# Patient Record
Sex: FEMALE | Race: WHITE | NOT HISPANIC OR LATINO | Employment: OTHER | RURAL
[De-identification: names, ages, dates, MRNs, and addresses within clinical notes are randomized per-mention and may not be internally consistent; named-entity substitution may affect disease eponyms.]

---

## 2020-01-08 LAB
CHOLEST SERPL-MSCNC: 227 MG/DL (ref 0–200)
HDLC SERPL-MCNC: 80 MG/DL
LDLC SERPL CALC-MCNC: 123 MG/DL
TRIGL SERPL-MCNC: 121 MG/DL

## 2020-06-08 ENCOUNTER — HISTORICAL (OUTPATIENT)
Dept: ADMINISTRATIVE | Facility: HOSPITAL | Age: 80
End: 2020-06-08

## 2020-06-08 LAB
ALBUMIN SERPL BCP-MCNC: 4.1 G/DL (ref 3.5–5)
ALBUMIN/GLOB SERPL: 1.1 {RATIO}
ALP SERPL-CCNC: 57 U/L (ref 55–142)
ALT SERPL W P-5'-P-CCNC: 7 U/L (ref 13–56)
AST SERPL W P-5'-P-CCNC: 19 U/L (ref 15–37)
BASOPHILS # BLD AUTO: 0.02 X10E3/UL (ref 0–0.2)
BASOPHILS NFR BLD AUTO: 0.3 % (ref 0–1)
BILIRUB SERPL-MCNC: 0.7 MG/DL (ref 0–1.2)
BUN SERPL-MCNC: 15 MG/DL (ref 7–18)
BUN/CREAT SERPL: 13.8
CALCIUM SERPL-MCNC: 9 MG/DL (ref 8.5–10.1)
CHLORIDE SERPL-SCNC: 106 MMOL/L (ref 98–107)
CO2 SERPL-SCNC: 28 MMOL/L (ref 21–32)
CREAT SERPL-MCNC: 1.09 MG/DL (ref 0.55–1.02)
EOSINOPHIL # BLD AUTO: 0.11 X10E3/UL (ref 0–0.5)
EOSINOPHIL NFR BLD AUTO: 1.7 % (ref 1–4)
ERYTHROCYTE [DISTWIDTH] IN BLOOD BY AUTOMATED COUNT: 13.1 % (ref 11.5–14.5)
GLOBULIN SER-MCNC: 3.6 G/DL (ref 2–4)
GLUCOSE SERPL-MCNC: 129 MG/DL (ref 74–106)
HCT VFR BLD AUTO: 40.3 % (ref 38–47)
HGB BLD-MCNC: 13.5 G/DL (ref 12–16)
LYMPHOCYTES # BLD AUTO: 1.75 X10E3/UL (ref 1–4.8)
LYMPHOCYTES NFR BLD AUTO: 27.8 % (ref 27–41)
MCH RBC QN AUTO: 31.7 PG (ref 27–31)
MCHC RBC AUTO-ENTMCNC: 33.5 G/DL (ref 32–36)
MCV RBC AUTO: 95 FL (ref 80–96)
MONOCYTES # BLD AUTO: 0.45 X10E3/UL (ref 0–0.8)
MONOCYTES NFR BLD AUTO: 7.1 % (ref 2–6)
MPC BLD CALC-MCNC: 10.4 FL (ref 9.4–12.4)
NEUTROPHILS # BLD AUTO: 3.97 X10E3/UL (ref 1.8–7.7)
NEUTROPHILS NFR BLD AUTO: 63.1 % (ref 53–65)
PLATELET # BLD AUTO: 252 X10E3/UL (ref 150–400)
POTASSIUM SERPL-SCNC: 3.7 MMOL/L (ref 3.5–5.1)
PROT SERPL-MCNC: 7.7 G/DL (ref 6.4–8.2)
RBC # BLD AUTO: 4.26 X10E6/UL (ref 4.2–5.4)
SODIUM SERPL-SCNC: 143 MMOL/L (ref 136–145)
WBC # BLD AUTO: 6.3 X10E3/UL (ref 4.5–11)

## 2020-06-24 ENCOUNTER — HISTORICAL (OUTPATIENT)
Dept: ADMINISTRATIVE | Facility: HOSPITAL | Age: 80
End: 2020-06-24

## 2020-06-24 LAB
BACTERIA #/AREA URNS HPF: ABNORMAL /HPF
BASOPHILS # BLD AUTO: 0.02 X10E3/UL (ref 0–0.2)
BASOPHILS NFR BLD AUTO: 0.3 % (ref 0–1)
BILIRUB UR QL STRIP: NEGATIVE MG/DL
CAOX CRY #/AREA URNS LPF: ABNORMAL /LPF
CLARITY UR: ABNORMAL
COLOR UR: YELLOW
EOSINOPHIL # BLD AUTO: 0.11 X10E3/UL (ref 0–0.5)
EOSINOPHIL NFR BLD AUTO: 1.7 % (ref 1–4)
ERYTHROCYTE [DISTWIDTH] IN BLOOD BY AUTOMATED COUNT: 13.2 % (ref 11.5–14.5)
GLUCOSE UR STRIP-MCNC: NEGATIVE MG/DL
HCT VFR BLD AUTO: 39.9 % (ref 38–47)
HGB BLD-MCNC: 13 G/DL (ref 12–16)
IMM GRANULOCYTES # BLD AUTO: 0.03 X10E3/UL (ref 0–0.04)
IMM GRANULOCYTES NFR BLD: 0.5 % (ref 0–0.4)
KETONES UR STRIP-SCNC: ABNORMAL MG/DL
LEUKOCYTE ESTERASE UR QL STRIP: ABNORMAL LEU/UL
LYMPHOCYTES # BLD AUTO: 1.31 X10E3/UL (ref 1–4.8)
LYMPHOCYTES NFR BLD AUTO: 20.2 % (ref 27–41)
MCH RBC QN AUTO: 30.7 PG (ref 27–31)
MCHC RBC AUTO-ENTMCNC: 32.6 G/DL (ref 32–36)
MCV RBC AUTO: 94.1 FL (ref 80–96)
MONOCYTES # BLD AUTO: 0.45 X10E3/UL (ref 0–0.8)
MONOCYTES NFR BLD AUTO: 6.9 % (ref 2–6)
MPC BLD CALC-MCNC: 11.1 FL (ref 9.4–12.4)
NEUTROPHILS # BLD AUTO: 4.57 X10E3/UL (ref 1.8–7.7)
NEUTROPHILS NFR BLD AUTO: 70.4 % (ref 53–65)
NITRITE UR QL STRIP: NEGATIVE
NRBC # BLD AUTO: 0 X10E3/UL (ref 0–0)
NRBC, AUTO (.00): 0 /100 (ref 0–0)
PH UR STRIP: 5.5 PH UNITS (ref 5–8)
PLATELET # BLD AUTO: 304 X10E3/UL (ref 150–400)
PROT UR QL STRIP: ABNORMAL MG/DL
RBC # BLD AUTO: 4.24 X10E6/UL (ref 4.2–5.4)
RBC # UR STRIP: NEGATIVE ERY/UL
RBC #/AREA URNS HPF: ABNORMAL /HPF (ref 0–3)
SP GR UR STRIP: 1.02 (ref 1–1.03)
SQUAMOUS #/AREA URNS LPF: ABNORMAL /LPF
TRANS CELLS #/AREA URNS LPF: ABNORMAL /LPF
UROBILINOGEN UR STRIP-ACNC: 0.2 EU/DL
WBC # BLD AUTO: 6.49 X10E3/UL (ref 4.5–11)
WBC #/AREA URNS HPF: ABNORMAL /HPF (ref 0–5)

## 2020-06-26 LAB
REPORT: 38
REPORT: NORMAL

## 2020-08-10 ENCOUNTER — HISTORICAL (OUTPATIENT)
Dept: ADMINISTRATIVE | Facility: HOSPITAL | Age: 80
End: 2020-08-10

## 2020-08-17 ENCOUNTER — HISTORICAL (OUTPATIENT)
Dept: ADMINISTRATIVE | Facility: HOSPITAL | Age: 80
End: 2020-08-17

## 2020-08-17 LAB
APTT PPP: 27.1 SECONDS (ref 25.2–37.3)
HCT VFR BLD AUTO: 32.6 % (ref 38–47)
HGB BLD-MCNC: 11.1 G/DL (ref 12–16)
INR BLD: 1.03 (ref 0–3.3)
MCHC RBC AUTO-ENTMCNC: 34 G/DL (ref 32–36)
PROTHROMBIN TIME: 13 SECONDS (ref 11.7–14.7)

## 2020-08-18 ENCOUNTER — HISTORICAL (OUTPATIENT)
Dept: ADMINISTRATIVE | Facility: HOSPITAL | Age: 80
End: 2020-08-18

## 2020-08-18 LAB
BASOPHILS # BLD AUTO: 0.03 X10E3/UL (ref 0–0.2)
BASOPHILS NFR BLD AUTO: 0.3 % (ref 0–1)
EOSINOPHIL # BLD AUTO: 0.04 X10E3/UL (ref 0–0.5)
EOSINOPHIL NFR BLD AUTO: 0.4 % (ref 1–4)
ERYTHROCYTE [DISTWIDTH] IN BLOOD BY AUTOMATED COUNT: 13.2 % (ref 11.5–14.5)
HCT VFR BLD AUTO: 31.5 % (ref 38–47)
HGB BLD-MCNC: 10.4 G/DL (ref 12–16)
IMM GRANULOCYTES # BLD AUTO: 0.03 X10E3/UL (ref 0–0.04)
IMM GRANULOCYTES NFR BLD: 0.3 % (ref 0–0.4)
INSULIN SERPL-ACNC: NORMAL U[IU]/ML
LAB AP CLINICAL INFORMATION: NORMAL
LAB AP DIAGNOSIS - HISTORICAL: NORMAL
LAB AP GROSS PATHOLOGY - HISTORICAL: NORMAL
LAB AP SPECIMEN SUBMITTED - HISTORICAL: NORMAL
LYMPHOCYTES # BLD AUTO: 1.88 X10E3/UL (ref 1–4.8)
LYMPHOCYTES NFR BLD AUTO: 19.7 % (ref 27–41)
MCH RBC QN AUTO: 31.6 PG (ref 27–31)
MCHC RBC AUTO-ENTMCNC: 33 G/DL (ref 32–36)
MCV RBC AUTO: 95.7 FL (ref 80–96)
MONOCYTES # BLD AUTO: 0.8 X10E3/UL (ref 0–0.8)
MONOCYTES NFR BLD AUTO: 8.4 % (ref 2–6)
MPC BLD CALC-MCNC: 10.9 FL (ref 9.4–12.4)
NEUTROPHILS # BLD AUTO: 6.77 X10E3/UL (ref 1.8–7.7)
NEUTROPHILS NFR BLD AUTO: 70.9 % (ref 53–65)
NRBC # BLD AUTO: 0 X10E3/UL (ref 0–0)
NRBC, AUTO (.00): 0 /100 (ref 0–0)
PLATELET # BLD AUTO: 231 X10E3/UL (ref 150–400)
RBC # BLD AUTO: 3.29 X10E6/UL (ref 4.2–5.4)
WBC # BLD AUTO: 9.55 X10E3/UL (ref 4.5–11)

## 2020-08-19 ENCOUNTER — HISTORICAL (OUTPATIENT)
Dept: ADMINISTRATIVE | Facility: HOSPITAL | Age: 80
End: 2020-08-19

## 2020-08-19 LAB
BASOPHILS # BLD AUTO: 0.02 X10E3/UL (ref 0–0.2)
BASOPHILS NFR BLD AUTO: 0.3 % (ref 0–1)
EOSINOPHIL # BLD AUTO: 0.09 X10E3/UL (ref 0–0.5)
EOSINOPHIL NFR BLD AUTO: 1.5 % (ref 1–4)
ERYTHROCYTE [DISTWIDTH] IN BLOOD BY AUTOMATED COUNT: 13.9 % (ref 11.5–14.5)
HCT VFR BLD AUTO: 34.7 % (ref 38–47)
HGB BLD-MCNC: 11.2 G/DL (ref 12–16)
IMM GRANULOCYTES # BLD AUTO: 0.02 X10E3/UL (ref 0–0.04)
IMM GRANULOCYTES NFR BLD: 0.3 % (ref 0–0.4)
LYMPHOCYTES # BLD AUTO: 1.17 X10E3/UL (ref 1–4.8)
LYMPHOCYTES NFR BLD AUTO: 19.6 % (ref 27–41)
MCH RBC QN AUTO: 31.2 PG (ref 27–31)
MCHC RBC AUTO-ENTMCNC: 32.3 G/DL (ref 32–36)
MCV RBC AUTO: 96.7 FL (ref 80–96)
MONOCYTES # BLD AUTO: 0.39 X10E3/UL (ref 0–0.8)
MONOCYTES NFR BLD AUTO: 6.5 % (ref 2–6)
MPC BLD CALC-MCNC: 11.1 FL (ref 9.4–12.4)
NEUTROPHILS # BLD AUTO: 4.28 X10E3/UL (ref 1.8–7.7)
NEUTROPHILS NFR BLD AUTO: 71.8 % (ref 53–65)
NRBC # BLD AUTO: 0 X10E3/UL (ref 0–0)
NRBC, AUTO (.00): 0 /100 (ref 0–0)
PLATELET # BLD AUTO: 261 X10E3/UL (ref 150–400)
RBC # BLD AUTO: 3.59 X10E6/UL (ref 4.2–5.4)
WBC # BLD AUTO: 5.97 X10E3/UL (ref 4.5–11)

## 2020-11-05 ENCOUNTER — HISTORICAL (OUTPATIENT)
Dept: ADMINISTRATIVE | Facility: HOSPITAL | Age: 80
End: 2020-11-05

## 2020-11-05 LAB
BILIRUB UR QL STRIP: NEGATIVE MG/DL
CLARITY UR: ABNORMAL
COLOR UR: YELLOW
GLUCOSE UR STRIP-MCNC: NEGATIVE MG/DL
KETONES UR STRIP-SCNC: NEGATIVE MG/DL
LEUKOCYTE ESTERASE UR QL STRIP: ABNORMAL LEU/UL
NITRITE UR QL STRIP: NEGATIVE
PH UR STRIP: 7 PH UNITS (ref 5–8)
PROT UR QL STRIP: 30 MG/DL
RBC # UR STRIP: ABNORMAL ERY/UL
RBC #/AREA URNS HPF: ABNORMAL /HPF (ref 0–3)
SP GR UR STRIP: 1.02 (ref 1–1.03)
UROBILINOGEN UR STRIP-ACNC: 0.2 MG/DL
WBC #/AREA URNS HPF: ABNORMAL /HPF (ref 0–5)

## 2020-11-24 ENCOUNTER — HISTORICAL (OUTPATIENT)
Dept: ADMINISTRATIVE | Facility: HOSPITAL | Age: 80
End: 2020-11-24

## 2020-11-26 LAB
REPORT: NORMAL

## 2021-01-08 ENCOUNTER — HISTORICAL (OUTPATIENT)
Dept: ADMINISTRATIVE | Facility: HOSPITAL | Age: 81
End: 2021-01-08

## 2021-01-08 LAB
AMORPH PHOS CRY #/AREA URNS LPF: ABNORMAL /LPF
BACTERIA #/AREA URNS HPF: ABNORMAL /HPF
BILIRUB UR QL STRIP: NEGATIVE MG/DL
CLARITY UR: ABNORMAL
COLOR UR: YELLOW
GLUCOSE UR STRIP-MCNC: NEGATIVE MG/DL
KETONES UR STRIP-SCNC: NEGATIVE MG/DL
LEUKOCYTE ESTERASE UR QL STRIP: ABNORMAL LEU/UL
NITRITE UR QL STRIP: NEGATIVE
PH UR STRIP: 5.5 PH UNITS (ref 5–8)
PROT UR QL STRIP: NEGATIVE MG/DL
RBC # UR STRIP: ABNORMAL ERY/UL
RBC #/AREA URNS HPF: ABNORMAL /HPF (ref 0–3)
SP GR UR STRIP: 1.01 (ref 1–1.03)
SQUAMOUS #/AREA URNS LPF: ABNORMAL /LPF
UROBILINOGEN UR STRIP-ACNC: 0.2 MG/DL
WBC #/AREA URNS HPF: ABNORMAL /HPF (ref 0–5)

## 2021-01-10 LAB
REPORT: 38
REPORT: NORMAL

## 2021-01-26 ENCOUNTER — HISTORICAL (OUTPATIENT)
Dept: ADMINISTRATIVE | Facility: HOSPITAL | Age: 81
End: 2021-01-26

## 2021-01-26 LAB
BACTERIA #/AREA URNS HPF: ABNORMAL /HPF
BILIRUB UR QL STRIP: NEGATIVE MG/DL
CLARITY UR: ABNORMAL
COLOR UR: YELLOW
GLUCOSE UR STRIP-MCNC: NEGATIVE MG/DL
KETONES UR STRIP-SCNC: NEGATIVE MG/DL
LEUKOCYTE ESTERASE UR QL STRIP: ABNORMAL LEU/UL
NITRITE UR QL STRIP: NEGATIVE
PH UR STRIP: 6 PH UNITS (ref 5–8)
PROT UR QL STRIP: NEGATIVE MG/DL
RBC # UR STRIP: NEGATIVE ERY/UL
RBC #/AREA URNS HPF: ABNORMAL /HPF (ref 0–3)
SP GR UR STRIP: 1.02 (ref 1–1.03)
SQUAMOUS #/AREA URNS LPF: ABNORMAL /LPF
UROBILINOGEN UR STRIP-ACNC: 1 MG/DL
WBC #/AREA URNS HPF: ABNORMAL /HPF (ref 0–5)

## 2021-01-28 LAB
REPORT: 38                 38
REPORT: NORMAL

## 2021-03-09 ENCOUNTER — HISTORICAL (OUTPATIENT)
Dept: ADMINISTRATIVE | Facility: HOSPITAL | Age: 81
End: 2021-03-09

## 2021-03-09 LAB
BACTERIA #/AREA URNS HPF: ABNORMAL /HPF
BILIRUB UR QL STRIP: NEGATIVE MG/DL
CLARITY UR: CLEAR
COLOR UR: YELLOW
GLUCOSE UR STRIP-MCNC: NEGATIVE MG/DL
HYALINE CASTS #/AREA URNS LPF: ABNORMAL /LPF (ref 0–2)
KETONES UR STRIP-SCNC: ABNORMAL MG/DL
LEUKOCYTE ESTERASE UR QL STRIP: NEGATIVE LEU/UL
MUCOUS THREADS #/AREA URNS HPF: ABNORMAL /HPF
NITRITE UR QL STRIP: NEGATIVE
PH UR STRIP: 5.5 PH UNITS (ref 5–8)
PROT UR QL STRIP: ABNORMAL MG/DL
RBC # UR STRIP: NEGATIVE ERY/UL
RBC #/AREA URNS HPF: ABNORMAL /HPF (ref 0–3)
SP GR UR STRIP: 1.02 (ref 1–1.03)
SQUAMOUS #/AREA URNS LPF: ABNORMAL /LPF
UROBILINOGEN UR STRIP-ACNC: 0.2 EU/DL
WBC #/AREA URNS HPF: ABNORMAL /HPF (ref 0–5)

## 2021-03-10 LAB
REPORT: NORMAL

## 2021-03-18 ENCOUNTER — TELEPHONE (OUTPATIENT)
Dept: OBSTETRICS AND GYNECOLOGY | Facility: CLINIC | Age: 81
End: 2021-03-18

## 2021-03-29 RX ORDER — ESTRADIOL 0.1 MG/G
1 CREAM VAGINAL
COMMUNITY
End: 2021-10-19

## 2021-03-29 RX ORDER — NITROFURANTOIN 25; 75 MG/1; MG/1
100 CAPSULE ORAL DAILY
COMMUNITY
Start: 2021-03-29 | End: 2021-07-20 | Stop reason: SDUPTHER

## 2021-04-20 ENCOUNTER — OFFICE VISIT (OUTPATIENT)
Dept: OBSTETRICS AND GYNECOLOGY | Facility: CLINIC | Age: 81
End: 2021-04-20
Payer: MEDICARE

## 2021-04-20 VITALS
DIASTOLIC BLOOD PRESSURE: 90 MMHG | TEMPERATURE: 97 F | RESPIRATION RATE: 16 BRPM | SYSTOLIC BLOOD PRESSURE: 135 MMHG | HEART RATE: 82 BPM | BODY MASS INDEX: 15.22 KG/M2 | HEIGHT: 67 IN | WEIGHT: 97 LBS

## 2021-04-20 DIAGNOSIS — N32.81 OAB (OVERACTIVE BLADDER): Primary | ICD-10-CM

## 2021-04-20 PROCEDURE — 95972 ALYS CPLX SP/PN NPGT W/PRGRM: CPT | Mod: ICN,,, | Performed by: OBSTETRICS & GYNECOLOGY

## 2021-04-20 PROCEDURE — 95972 PR PRG SPNL GEN CMPLX: ICD-10-PCS | Mod: ICN,,, | Performed by: OBSTETRICS & GYNECOLOGY

## 2021-06-30 ENCOUNTER — OFFICE VISIT (OUTPATIENT)
Dept: OBSTETRICS AND GYNECOLOGY | Facility: CLINIC | Age: 81
End: 2021-06-30
Payer: MEDICARE

## 2021-06-30 VITALS
RESPIRATION RATE: 16 BRPM | WEIGHT: 93 LBS | HEIGHT: 67 IN | HEART RATE: 93 BPM | DIASTOLIC BLOOD PRESSURE: 82 MMHG | BODY MASS INDEX: 14.6 KG/M2 | TEMPERATURE: 98 F | SYSTOLIC BLOOD PRESSURE: 126 MMHG

## 2021-06-30 DIAGNOSIS — N30.90 CYSTITIS: ICD-10-CM

## 2021-06-30 DIAGNOSIS — N95.2 VAGINITIS, ATROPHIC: ICD-10-CM

## 2021-06-30 DIAGNOSIS — R39.15 URGENCY OF URINATION: ICD-10-CM

## 2021-06-30 DIAGNOSIS — R35.0 FREQUENCY OF URINATION: ICD-10-CM

## 2021-06-30 DIAGNOSIS — R35.1 NOCTURIA: ICD-10-CM

## 2021-06-30 DIAGNOSIS — N32.81 OAB (OVERACTIVE BLADDER): Primary | ICD-10-CM

## 2021-06-30 LAB
BILIRUB UR QL STRIP: NEGATIVE
CLARITY UR: CLEAR
COLOR UR: YELLOW
GLUCOSE UR STRIP-MCNC: NEGATIVE MG/DL
KETONES UR STRIP-SCNC: 15 MG/DL
LEUKOCYTE ESTERASE UR QL STRIP: NEGATIVE
NITRITE UR QL STRIP: NEGATIVE
PH UR STRIP: 6 PH UNITS
PROT UR QL STRIP: ABNORMAL
RBC # UR STRIP: NEGATIVE /UL
SP GR UR STRIP: 1.01
UROBILINOGEN UR STRIP-ACNC: 1 MG/DL

## 2021-06-30 PROCEDURE — 81003 URINALYSIS AUTO W/O SCOPE: CPT | Mod: QW,,, | Performed by: CLINICAL MEDICAL LABORATORY

## 2021-06-30 PROCEDURE — 99214 PR OFFICE/OUTPT VISIT, EST, LEVL IV, 30-39 MIN: ICD-10-PCS | Mod: ,,, | Performed by: OBSTETRICS & GYNECOLOGY

## 2021-06-30 PROCEDURE — 99214 OFFICE O/P EST MOD 30 MIN: CPT | Mod: ,,, | Performed by: OBSTETRICS & GYNECOLOGY

## 2021-06-30 PROCEDURE — 81003 URINALYSIS, REFLEX TO URINE CULTURE: ICD-10-PCS | Mod: QW,,, | Performed by: CLINICAL MEDICAL LABORATORY

## 2021-06-30 RX ORDER — MIRTAZAPINE 15 MG/1
TABLET, FILM COATED ORAL
COMMUNITY
End: 2021-10-19

## 2021-06-30 RX ORDER — CALCIUM CARBONATE/VITAMIN D3 600 MG-20
TABLET,CHEWABLE ORAL DAILY
COMMUNITY

## 2021-06-30 RX ORDER — PANTOPRAZOLE SODIUM 40 MG/1
40 TABLET, DELAYED RELEASE ORAL 2 TIMES DAILY
COMMUNITY
Start: 2021-06-28

## 2021-06-30 RX ORDER — IBUPROFEN 800 MG/1
TABLET ORAL
COMMUNITY
Start: 2021-01-28 | End: 2021-11-10 | Stop reason: SDUPTHER

## 2021-06-30 RX ORDER — POTASSIUM CHLORIDE 750 MG/1
10 CAPSULE, EXTENDED RELEASE ORAL 2 TIMES DAILY
COMMUNITY
Start: 2021-06-18 | End: 2021-12-02 | Stop reason: SDUPTHER

## 2021-06-30 RX ORDER — LEVOTHYROXINE SODIUM 125 UG/1
TABLET ORAL
COMMUNITY
Start: 2021-04-07 | End: 2021-09-30

## 2021-06-30 RX ORDER — FLUDROCORTISONE ACETATE 0.1 MG/1
100 TABLET ORAL 2 TIMES DAILY
COMMUNITY
Start: 2021-06-28 | End: 2021-10-19

## 2021-06-30 RX ORDER — CETIRIZINE HYDROCHLORIDE 10 MG/1
TABLET ORAL
COMMUNITY
End: 2021-09-20 | Stop reason: SDUPTHER

## 2021-06-30 RX ORDER — MONTELUKAST SODIUM 10 MG/1
TABLET ORAL
COMMUNITY

## 2021-06-30 RX ORDER — PYRIDOSTIGMINE BROMIDE 60 MG/1
30 TABLET ORAL 2 TIMES DAILY
COMMUNITY
Start: 2021-06-18 | End: 2023-10-03

## 2021-06-30 RX ORDER — CARBIDOPA AND LEVODOPA 25; 100 MG/1; MG/1
TABLET ORAL
COMMUNITY
End: 2022-05-09 | Stop reason: SDUPTHER

## 2021-06-30 RX ORDER — ONDANSETRON 4 MG/1
TABLET, ORALLY DISINTEGRATING ORAL
COMMUNITY
Start: 2021-04-06 | End: 2021-08-19 | Stop reason: SDUPTHER

## 2021-06-30 RX ORDER — VIT C/E/ZN/COPPR/LUTEIN/ZEAXAN 250MG-90MG
1000 CAPSULE ORAL DAILY
COMMUNITY

## 2021-06-30 RX ORDER — TRAMADOL HYDROCHLORIDE 50 MG/1
TABLET ORAL
COMMUNITY
Start: 2021-06-22

## 2021-06-30 RX ORDER — DONEPEZIL HYDROCHLORIDE 10 MG/1
10 TABLET, FILM COATED ORAL DAILY
COMMUNITY
Start: 2021-06-08

## 2021-06-30 RX ORDER — VENLAFAXINE HYDROCHLORIDE 75 MG/1
75 CAPSULE, EXTENDED RELEASE ORAL DAILY
COMMUNITY
Start: 2021-05-03 | End: 2023-04-19 | Stop reason: DRUGHIGH

## 2021-07-14 ENCOUNTER — TELEPHONE (OUTPATIENT)
Dept: FAMILY MEDICINE | Facility: CLINIC | Age: 81
End: 2021-07-14

## 2021-07-15 ENCOUNTER — TELEPHONE (OUTPATIENT)
Dept: OBSTETRICS AND GYNECOLOGY | Facility: CLINIC | Age: 81
End: 2021-07-15

## 2021-07-20 ENCOUNTER — TELEPHONE (OUTPATIENT)
Dept: OBSTETRICS AND GYNECOLOGY | Facility: CLINIC | Age: 81
End: 2021-07-20

## 2021-07-20 RX ORDER — NITROFURANTOIN 25; 75 MG/1; MG/1
100 CAPSULE ORAL DAILY
Qty: 90 CAPSULE | Refills: 1 | Status: SHIPPED | OUTPATIENT
Start: 2021-07-20 | End: 2022-01-16

## 2021-07-29 ENCOUNTER — HOSPITAL ENCOUNTER (EMERGENCY)
Facility: HOSPITAL | Age: 81
Discharge: HOME | End: 2021-07-29
Attending: FAMILY MEDICINE
Payer: MEDICARE

## 2021-07-29 VITALS
DIASTOLIC BLOOD PRESSURE: 92 MMHG | HEIGHT: 67 IN | RESPIRATION RATE: 19 BRPM | WEIGHT: 90 LBS | BODY MASS INDEX: 14.12 KG/M2 | HEART RATE: 85 BPM | SYSTOLIC BLOOD PRESSURE: 159 MMHG | OXYGEN SATURATION: 98 % | TEMPERATURE: 98 F

## 2021-07-29 DIAGNOSIS — I10 ESSENTIAL HYPERTENSION: Primary | ICD-10-CM

## 2021-07-29 LAB
ALBUMIN SERPL BCP-MCNC: 3.6 G/DL (ref 3.5–5)
ALBUMIN/GLOB SERPL: 1.1 {RATIO}
ALP SERPL-CCNC: 76 U/L (ref 55–142)
ALT SERPL W P-5'-P-CCNC: 10 U/L (ref 13–56)
ANION GAP SERPL CALCULATED.3IONS-SCNC: 10 MMOL/L (ref 7–16)
AST SERPL W P-5'-P-CCNC: 15 U/L (ref 15–37)
BASOPHILS # BLD AUTO: 0.02 K/UL (ref 0–0.2)
BASOPHILS NFR BLD AUTO: 0.4 % (ref 0–1)
BILIRUB SERPL-MCNC: 0.3 MG/DL (ref 0–1.2)
BUN SERPL-MCNC: 14 MG/DL (ref 7–18)
BUN/CREAT SERPL: 18 (ref 6–20)
CALCIUM SERPL-MCNC: 8.2 MG/DL (ref 8.5–10.1)
CHLORIDE SERPL-SCNC: 104 MMOL/L (ref 98–107)
CO2 SERPL-SCNC: 29 MMOL/L (ref 21–32)
CREAT SERPL-MCNC: 0.78 MG/DL (ref 0.55–1.02)
DIFFERENTIAL METHOD BLD: ABNORMAL
EOSINOPHIL # BLD AUTO: 0.06 K/UL (ref 0–0.5)
EOSINOPHIL NFR BLD AUTO: 1.2 % (ref 1–4)
ERYTHROCYTE [DISTWIDTH] IN BLOOD BY AUTOMATED COUNT: 13.1 % (ref 11.5–14.5)
GLOBULIN SER-MCNC: 3.2 G/DL (ref 2–4)
GLUCOSE SERPL-MCNC: 100 MG/DL (ref 74–106)
HCT VFR BLD AUTO: 35.9 % (ref 38–47)
HGB BLD-MCNC: 12.2 G/DL (ref 12–16)
LYMPHOCYTES # BLD AUTO: 1.23 K/UL (ref 1–4.8)
LYMPHOCYTES NFR BLD AUTO: 24.6 % (ref 27–41)
MCH RBC QN AUTO: 32.3 PG (ref 27–31)
MCHC RBC AUTO-ENTMCNC: 34 G/DL (ref 32–36)
MCV RBC AUTO: 95 FL (ref 80–96)
MONOCYTES # BLD AUTO: 0.45 K/UL (ref 0–0.8)
MONOCYTES NFR BLD AUTO: 9 % (ref 2–6)
MPC BLD CALC-MCNC: 10.2 FL (ref 9.4–12.4)
NEUTROPHILS # BLD AUTO: 3.24 K/UL (ref 1.8–7.7)
NEUTROPHILS NFR BLD AUTO: 64.8 % (ref 53–65)
NRBC BLD MANUAL-RTO: NORMAL %
PLATELET # BLD AUTO: 288 K/UL (ref 150–400)
POTASSIUM SERPL-SCNC: 3.7 MMOL/L (ref 3.5–5.1)
PROT SERPL-MCNC: 6.8 G/DL (ref 6.4–8.2)
RBC # BLD AUTO: 3.78 M/UL (ref 4.2–5.4)
SODIUM SERPL-SCNC: 139 MMOL/L (ref 136–145)
WBC # BLD AUTO: 5 K/UL (ref 4.5–11)

## 2021-07-29 PROCEDURE — 99282 EMERGENCY DEPT VISIT SF MDM: CPT

## 2021-07-29 PROCEDURE — 36415 COLL VENOUS BLD VENIPUNCTURE: CPT | Performed by: FAMILY MEDICINE

## 2021-07-29 PROCEDURE — 85025 COMPLETE CBC W/AUTO DIFF WBC: CPT | Performed by: FAMILY MEDICINE

## 2021-07-29 PROCEDURE — 80053 COMPREHEN METABOLIC PANEL: CPT | Performed by: FAMILY MEDICINE

## 2021-07-29 PROCEDURE — 99282 EMERGENCY DEPT VISIT SF MDM: CPT | Performed by: FAMILY MEDICINE

## 2021-07-30 ENCOUNTER — TELEPHONE (OUTPATIENT)
Dept: EMERGENCY MEDICINE | Facility: HOSPITAL | Age: 81
End: 2021-07-30

## 2021-08-19 RX ORDER — ONDANSETRON 4 MG/1
4 TABLET, ORALLY DISINTEGRATING ORAL EVERY 12 HOURS PRN
Qty: 60 TABLET | Refills: 1 | Status: SHIPPED | OUTPATIENT
Start: 2021-08-19 | End: 2022-02-09 | Stop reason: SDUPTHER

## 2021-09-20 RX ORDER — CETIRIZINE HYDROCHLORIDE 10 MG/1
10 TABLET ORAL DAILY
Qty: 90 TABLET | Refills: 1 | Status: SHIPPED | OUTPATIENT
Start: 2021-09-20 | End: 2022-05-02 | Stop reason: SDUPTHER

## 2021-09-28 ENCOUNTER — OFFICE VISIT (OUTPATIENT)
Dept: FAMILY MEDICINE | Facility: CLINIC | Age: 81
End: 2021-09-28
Payer: MEDICARE

## 2021-09-28 VITALS
HEIGHT: 67 IN | HEART RATE: 104 BPM | BODY MASS INDEX: 14.57 KG/M2 | TEMPERATURE: 97 F | SYSTOLIC BLOOD PRESSURE: 102 MMHG | OXYGEN SATURATION: 97 % | WEIGHT: 92.81 LBS | DIASTOLIC BLOOD PRESSURE: 58 MMHG

## 2021-09-28 DIAGNOSIS — E03.9 HYPOTHYROIDISM, UNSPECIFIED TYPE: ICD-10-CM

## 2021-09-28 DIAGNOSIS — E86.0 DEHYDRATION: Primary | ICD-10-CM

## 2021-09-28 DIAGNOSIS — R68.84 JAW PAIN: ICD-10-CM

## 2021-09-28 LAB
ANION GAP SERPL CALCULATED.3IONS-SCNC: 10 MMOL/L (ref 7–16)
BUN SERPL-MCNC: 27 MG/DL (ref 7–18)
BUN/CREAT SERPL: 20 (ref 6–20)
CALCIUM SERPL-MCNC: 9.6 MG/DL (ref 8.5–10.1)
CHLORIDE SERPL-SCNC: 109 MMOL/L (ref 98–107)
CO2 SERPL-SCNC: 26 MMOL/L (ref 21–32)
CREAT SERPL-MCNC: 1.33 MG/DL (ref 0.55–1.02)
GLUCOSE SERPL-MCNC: 126 MG/DL (ref 74–106)
POTASSIUM SERPL-SCNC: 4.8 MMOL/L (ref 3.5–5.1)
SODIUM SERPL-SCNC: 140 MMOL/L (ref 136–145)
TSH SERPL DL<=0.005 MIU/L-ACNC: 0.03 UIU/ML (ref 0.36–3.74)

## 2021-09-28 PROCEDURE — 99213 PR OFFICE/OUTPT VISIT, EST, LEVL III, 20-29 MIN: ICD-10-PCS | Mod: ,,, | Performed by: NURSE PRACTITIONER

## 2021-09-28 PROCEDURE — 80048 BASIC METABOLIC PANEL: ICD-10-PCS | Mod: ,,, | Performed by: CLINICAL MEDICAL LABORATORY

## 2021-09-28 PROCEDURE — 84443 ASSAY THYROID STIM HORMONE: CPT | Mod: ,,, | Performed by: CLINICAL MEDICAL LABORATORY

## 2021-09-28 PROCEDURE — 84443 TSH: ICD-10-PCS | Mod: ,,, | Performed by: CLINICAL MEDICAL LABORATORY

## 2021-09-28 PROCEDURE — 80048 BASIC METABOLIC PNL TOTAL CA: CPT | Mod: ,,, | Performed by: CLINICAL MEDICAL LABORATORY

## 2021-09-28 PROCEDURE — 99213 OFFICE O/P EST LOW 20 MIN: CPT | Mod: ,,, | Performed by: NURSE PRACTITIONER

## 2021-09-30 PROBLEM — E03.9 HYPOTHYROIDISM: Status: ACTIVE | Noted: 2021-09-30

## 2021-09-30 RX ORDER — LEVOTHYROXINE SODIUM 100 UG/1
100 TABLET ORAL
COMMUNITY
End: 2021-09-30 | Stop reason: SDUPTHER

## 2021-09-30 RX ORDER — LEVOTHYROXINE SODIUM 100 UG/1
100 TABLET ORAL
Qty: 90 TABLET | Refills: 1 | Status: SHIPPED | OUTPATIENT
Start: 2021-09-30 | End: 2022-02-23 | Stop reason: DRUGHIGH

## 2021-10-19 ENCOUNTER — OFFICE VISIT (OUTPATIENT)
Dept: OBSTETRICS AND GYNECOLOGY | Facility: CLINIC | Age: 81
End: 2021-10-19
Payer: MEDICARE

## 2021-10-19 VITALS
WEIGHT: 92 LBS | SYSTOLIC BLOOD PRESSURE: 120 MMHG | TEMPERATURE: 98 F | HEIGHT: 67 IN | BODY MASS INDEX: 14.44 KG/M2 | DIASTOLIC BLOOD PRESSURE: 55 MMHG | RESPIRATION RATE: 16 BRPM | HEART RATE: 91 BPM

## 2021-10-19 DIAGNOSIS — E03.9 HYPOTHYROIDISM, UNSPECIFIED TYPE: ICD-10-CM

## 2021-10-19 DIAGNOSIS — R35.1 NOCTURIA MORE THAN TWICE PER NIGHT: ICD-10-CM

## 2021-10-19 DIAGNOSIS — N30.90 CYSTITIS: ICD-10-CM

## 2021-10-19 DIAGNOSIS — N95.2 VAGINITIS, ATROPHIC: ICD-10-CM

## 2021-10-19 DIAGNOSIS — N32.81 OAB (OVERACTIVE BLADDER): Primary | ICD-10-CM

## 2021-10-19 PROCEDURE — 99214 OFFICE O/P EST MOD 30 MIN: CPT | Mod: 25,,, | Performed by: OBSTETRICS & GYNECOLOGY

## 2021-10-19 PROCEDURE — 99214 PR OFFICE/OUTPT VISIT, EST, LEVL IV, 30-39 MIN: ICD-10-PCS | Mod: 25,,, | Performed by: OBSTETRICS & GYNECOLOGY

## 2021-10-19 PROCEDURE — 95972 PR PRG SPNL GEN CMPLX: ICD-10-PCS | Mod: ,,, | Performed by: OBSTETRICS & GYNECOLOGY

## 2021-10-19 PROCEDURE — 95972 ALYS CPLX SP/PN NPGT W/PRGRM: CPT | Mod: ,,, | Performed by: OBSTETRICS & GYNECOLOGY

## 2021-10-23 ENCOUNTER — HOSPITAL ENCOUNTER (EMERGENCY)
Facility: HOSPITAL | Age: 81
Discharge: HOME | End: 2021-10-23
Payer: MEDICARE

## 2021-10-23 VITALS
HEART RATE: 76 BPM | HEIGHT: 67 IN | DIASTOLIC BLOOD PRESSURE: 79 MMHG | SYSTOLIC BLOOD PRESSURE: 119 MMHG | OXYGEN SATURATION: 97 % | WEIGHT: 92 LBS | TEMPERATURE: 98 F | RESPIRATION RATE: 20 BRPM | BODY MASS INDEX: 14.44 KG/M2

## 2021-10-23 DIAGNOSIS — K11.20 PAROTITIS: Primary | ICD-10-CM

## 2021-10-23 PROCEDURE — 96375 TX/PRO/DX INJ NEW DRUG ADDON: CPT

## 2021-10-23 PROCEDURE — 63600175 PHARM REV CODE 636 W HCPCS: Performed by: NURSE PRACTITIONER

## 2021-10-23 PROCEDURE — 96374 THER/PROPH/DIAG INJ IV PUSH: CPT

## 2021-10-23 PROCEDURE — 99283 EMERGENCY DEPT VISIT LOW MDM: CPT | Mod: GF | Performed by: NURSE PRACTITIONER

## 2021-10-23 PROCEDURE — 99284 EMERGENCY DEPT VISIT MOD MDM: CPT | Mod: 25

## 2021-10-23 RX ORDER — METHYLPREDNISOLONE 4 MG/1
TABLET ORAL
Qty: 1 EACH | Refills: 0 | Status: SHIPPED | OUTPATIENT
Start: 2021-10-23 | End: 2021-11-13

## 2021-10-23 RX ORDER — DEXAMETHASONE SODIUM PHOSPHATE 4 MG/ML
4 INJECTION, SOLUTION INTRA-ARTICULAR; INTRALESIONAL; INTRAMUSCULAR; INTRAVENOUS; SOFT TISSUE
Status: COMPLETED | OUTPATIENT
Start: 2021-10-23 | End: 2021-10-23

## 2021-10-23 RX ORDER — ONDANSETRON 4 MG/1
4 TABLET, FILM COATED ORAL EVERY 8 HOURS PRN
Qty: 12 TABLET | Refills: 0 | Status: SHIPPED | OUTPATIENT
Start: 2021-10-23 | End: 2022-02-09 | Stop reason: SDUPTHER

## 2021-10-23 RX ORDER — ONDANSETRON 2 MG/ML
4 INJECTION INTRAMUSCULAR; INTRAVENOUS
Status: COMPLETED | OUTPATIENT
Start: 2021-10-23 | End: 2021-10-23

## 2021-10-23 RX ORDER — DEXAMETHASONE SODIUM PHOSPHATE 4 MG/ML
4 INJECTION, SOLUTION INTRA-ARTICULAR; INTRALESIONAL; INTRAMUSCULAR; INTRAVENOUS; SOFT TISSUE
Status: DISCONTINUED | OUTPATIENT
Start: 2021-10-23 | End: 2021-10-23

## 2021-10-23 RX ADMIN — DEXAMETHASONE SODIUM PHOSPHATE 4 MG: 4 INJECTION, SOLUTION INTRAMUSCULAR; INTRAVENOUS at 10:10

## 2021-10-23 RX ADMIN — ONDANSETRON 4 MG: 2 INJECTION INTRAMUSCULAR; INTRAVENOUS at 09:10

## 2021-10-24 ENCOUNTER — TELEPHONE (OUTPATIENT)
Dept: EMERGENCY MEDICINE | Facility: HOSPITAL | Age: 81
End: 2021-10-24
Payer: MEDICARE

## 2021-11-10 ENCOUNTER — OFFICE VISIT (OUTPATIENT)
Dept: FAMILY MEDICINE | Facility: CLINIC | Age: 81
End: 2021-11-10
Payer: MEDICARE

## 2021-11-10 VITALS
TEMPERATURE: 97 F | SYSTOLIC BLOOD PRESSURE: 96 MMHG | HEART RATE: 94 BPM | OXYGEN SATURATION: 96 % | BODY MASS INDEX: 15.24 KG/M2 | DIASTOLIC BLOOD PRESSURE: 70 MMHG | WEIGHT: 94.81 LBS | HEIGHT: 66 IN | RESPIRATION RATE: 18 BRPM

## 2021-11-10 DIAGNOSIS — Z23 ENCOUNTER FOR IMMUNIZATION: ICD-10-CM

## 2021-11-10 DIAGNOSIS — R53.1 GENERALIZED WEAKNESS: ICD-10-CM

## 2021-11-10 DIAGNOSIS — F03.90 DEMENTIA WITHOUT BEHAVIORAL DISTURBANCE, UNSPECIFIED DEMENTIA TYPE: Primary | ICD-10-CM

## 2021-11-10 DIAGNOSIS — G89.4 CHRONIC PAIN SYNDROME: ICD-10-CM

## 2021-11-10 PROCEDURE — 99213 OFFICE O/P EST LOW 20 MIN: CPT | Mod: ,,, | Performed by: FAMILY MEDICINE

## 2021-11-10 PROCEDURE — 99213 PR OFFICE/OUTPT VISIT, EST, LEVL III, 20-29 MIN: ICD-10-PCS | Mod: ,,, | Performed by: FAMILY MEDICINE

## 2021-11-10 RX ORDER — IBUPROFEN 800 MG/1
400 TABLET ORAL EVERY 6 HOURS PRN
Qty: 30 TABLET | Refills: 1 | Status: SHIPPED | OUTPATIENT
Start: 2021-11-10 | End: 2023-05-12

## 2021-11-12 PROCEDURE — 90662 FLU VACCINE - QUADRIVALENT - HIGH DOSE (65+) PRESERVATIVE FREE IM: ICD-10-PCS | Mod: ,,, | Performed by: FAMILY MEDICINE

## 2021-11-12 PROCEDURE — 90662 IIV NO PRSV INCREASED AG IM: CPT | Mod: ,,, | Performed by: FAMILY MEDICINE

## 2021-11-12 PROCEDURE — G0008 FLU VACCINE - QUADRIVALENT - HIGH DOSE (65+) PRESERVATIVE FREE IM: ICD-10-PCS | Mod: ,,, | Performed by: FAMILY MEDICINE

## 2021-11-12 PROCEDURE — G0008 ADMIN INFLUENZA VIRUS VAC: HCPCS | Mod: ,,, | Performed by: FAMILY MEDICINE

## 2021-11-16 ENCOUNTER — OFFICE VISIT (OUTPATIENT)
Dept: OBSTETRICS AND GYNECOLOGY | Facility: CLINIC | Age: 81
End: 2021-11-16
Payer: MEDICARE

## 2021-11-16 VITALS
WEIGHT: 96.19 LBS | RESPIRATION RATE: 16 BRPM | HEIGHT: 67 IN | HEART RATE: 72 BPM | TEMPERATURE: 97 F | DIASTOLIC BLOOD PRESSURE: 75 MMHG | SYSTOLIC BLOOD PRESSURE: 110 MMHG | BODY MASS INDEX: 15.1 KG/M2

## 2021-11-16 DIAGNOSIS — R35.1 NOCTURIA MORE THAN TWICE PER NIGHT: ICD-10-CM

## 2021-11-16 DIAGNOSIS — N32.81 OAB (OVERACTIVE BLADDER): Primary | ICD-10-CM

## 2021-11-16 DIAGNOSIS — F03.90 DEMENTIA WITHOUT BEHAVIORAL DISTURBANCE, UNSPECIFIED DEMENTIA TYPE: ICD-10-CM

## 2021-11-16 DIAGNOSIS — G20.A1 PARKINSON'S DISEASE: ICD-10-CM

## 2021-11-16 LAB
BASOPHILS # BLD AUTO: 0.03 K/UL (ref 0–0.2)
BASOPHILS NFR BLD AUTO: 0.6 % (ref 0–1)
DIFFERENTIAL METHOD BLD: ABNORMAL
EOSINOPHIL # BLD AUTO: 0.12 K/UL (ref 0–0.5)
EOSINOPHIL NFR BLD AUTO: 2.3 % (ref 1–4)
ERYTHROCYTE [DISTWIDTH] IN BLOOD BY AUTOMATED COUNT: 13.7 % (ref 11.5–14.5)
HCT VFR BLD AUTO: 38 % (ref 38–47)
HGB BLD-MCNC: 12.4 G/DL (ref 12–16)
IMM GRANULOCYTES # BLD AUTO: 0.02 K/UL (ref 0–0.04)
IMM GRANULOCYTES NFR BLD: 0.4 % (ref 0–0.4)
LYMPHOCYTES # BLD AUTO: 1.55 K/UL (ref 1–4.8)
LYMPHOCYTES NFR BLD AUTO: 29.5 % (ref 27–41)
MCH RBC QN AUTO: 31.7 PG (ref 27–31)
MCHC RBC AUTO-ENTMCNC: 32.6 G/DL (ref 32–36)
MCV RBC AUTO: 97.2 FL (ref 80–96)
MONOCYTES # BLD AUTO: 0.39 K/UL (ref 0–0.8)
MONOCYTES NFR BLD AUTO: 7.4 % (ref 2–6)
MPC BLD CALC-MCNC: 11 FL (ref 9.4–12.4)
NEUTROPHILS # BLD AUTO: 3.15 K/UL (ref 1.8–7.7)
NEUTROPHILS NFR BLD AUTO: 59.8 % (ref 53–65)
NRBC # BLD AUTO: 0 X10E3/UL
NRBC, AUTO (.00): 0 %
PLATELET # BLD AUTO: 306 K/UL (ref 150–400)
RBC # BLD AUTO: 3.91 M/UL (ref 4.2–5.4)
SARS-COV+SARS-COV-2 AG RESP QL IA.RAPID: NEGATIVE
WBC # BLD AUTO: 5.26 K/UL (ref 4.5–11)

## 2021-11-16 PROCEDURE — 85025 COMPLETE CBC W/AUTO DIFF WBC: CPT | Mod: ,,, | Performed by: CLINICAL MEDICAL LABORATORY

## 2021-11-16 PROCEDURE — 95972 ALYS CPLX SP/PN NPGT W/PRGRM: CPT | Mod: ,,, | Performed by: OBSTETRICS & GYNECOLOGY

## 2021-11-16 PROCEDURE — 99499 NO LOS: ICD-10-PCS | Mod: ,,, | Performed by: OBSTETRICS & GYNECOLOGY

## 2021-11-16 PROCEDURE — 36415 PR COLLECTION VENOUS BLOOD,VENIPUNCTURE: ICD-10-PCS | Mod: ,,, | Performed by: OBSTETRICS & GYNECOLOGY

## 2021-11-16 PROCEDURE — 99499 UNLISTED E&M SERVICE: CPT | Mod: ,,, | Performed by: OBSTETRICS & GYNECOLOGY

## 2021-11-16 PROCEDURE — 85025 CBC WITH DIFFERENTIAL: ICD-10-PCS | Mod: ,,, | Performed by: CLINICAL MEDICAL LABORATORY

## 2021-11-16 PROCEDURE — 36415 COLL VENOUS BLD VENIPUNCTURE: CPT | Mod: ,,, | Performed by: OBSTETRICS & GYNECOLOGY

## 2021-11-16 PROCEDURE — 95972 PR PRG SPNL GEN CMPLX: ICD-10-PCS | Mod: ,,, | Performed by: OBSTETRICS & GYNECOLOGY

## 2021-11-16 RX ORDER — SODIUM CHLORIDE, SODIUM LACTATE, POTASSIUM CHLORIDE, CALCIUM CHLORIDE 600; 310; 30; 20 MG/100ML; MG/100ML; MG/100ML; MG/100ML
INJECTION, SOLUTION INTRAVENOUS CONTINUOUS
Status: CANCELLED | OUTPATIENT
Start: 2021-11-16 | End: 2021-11-16

## 2021-11-16 RX ORDER — ONDANSETRON 2 MG/ML
4 INJECTION INTRAMUSCULAR; INTRAVENOUS EVERY 12 HOURS PRN
Status: CANCELLED | OUTPATIENT
Start: 2021-11-16

## 2021-11-16 RX ORDER — LIDOCAINE HYDROCHLORIDE 10 MG/ML
1 INJECTION, SOLUTION EPIDURAL; INFILTRATION; INTRACAUDAL; PERINEURAL ONCE
Status: CANCELLED | OUTPATIENT
Start: 2021-11-16 | End: 2021-11-16

## 2021-11-16 RX ORDER — MUPIROCIN 20 MG/G
OINTMENT TOPICAL
Status: CANCELLED | OUTPATIENT
Start: 2021-11-16

## 2021-11-22 ENCOUNTER — ANESTHESIA (OUTPATIENT)
Dept: SURGERY | Facility: HOSPITAL | Age: 81
End: 2021-11-22
Payer: MEDICARE

## 2021-11-22 ENCOUNTER — HOSPITAL ENCOUNTER (OUTPATIENT)
Facility: HOSPITAL | Age: 81
Discharge: HOME | End: 2021-11-22
Attending: OBSTETRICS & GYNECOLOGY | Admitting: OBSTETRICS & GYNECOLOGY
Payer: MEDICARE

## 2021-11-22 ENCOUNTER — ANESTHESIA EVENT (OUTPATIENT)
Dept: SURGERY | Facility: HOSPITAL | Age: 81
End: 2021-11-22
Payer: MEDICARE

## 2021-11-22 VITALS
WEIGHT: 96 LBS | TEMPERATURE: 98 F | HEIGHT: 67 IN | DIASTOLIC BLOOD PRESSURE: 63 MMHG | RESPIRATION RATE: 16 BRPM | OXYGEN SATURATION: 100 % | SYSTOLIC BLOOD PRESSURE: 126 MMHG | HEART RATE: 81 BPM | BODY MASS INDEX: 15.07 KG/M2

## 2021-11-22 DIAGNOSIS — R35.1 NOCTURIA MORE THAN TWICE PER NIGHT: ICD-10-CM

## 2021-11-22 DIAGNOSIS — I10 HYPERTENSION: ICD-10-CM

## 2021-11-22 DIAGNOSIS — N32.81 OAB (OVERACTIVE BLADDER): ICD-10-CM

## 2021-11-22 DIAGNOSIS — F03.90 DEMENTIA WITHOUT BEHAVIORAL DISTURBANCE, UNSPECIFIED DEMENTIA TYPE: ICD-10-CM

## 2021-11-22 LAB
BILIRUB UR QL STRIP: NEGATIVE
CLARITY UR: CLEAR
COLOR UR: NORMAL
GLUCOSE UR STRIP-MCNC: NEGATIVE MG/DL
KETONES UR STRIP-SCNC: NEGATIVE MG/DL
LEUKOCYTE ESTERASE UR QL STRIP: NEGATIVE
NITRITE UR QL STRIP: NEGATIVE
PH UR STRIP: 5.5 PH UNITS
PROT UR QL STRIP: NEGATIVE
RBC # UR STRIP: NEGATIVE /UL
SP GR UR STRIP: 1.01
UROBILINOGEN UR STRIP-ACNC: 0.2 MG/DL

## 2021-11-22 PROCEDURE — 71000016 HC POSTOP RECOV ADDL HR: Performed by: OBSTETRICS & GYNECOLOGY

## 2021-11-22 PROCEDURE — 36000707: Performed by: OBSTETRICS & GYNECOLOGY

## 2021-11-22 PROCEDURE — 64585 PR REVISE/REMOVE PERIPHERAL NEUROELECTRODE: ICD-10-PCS | Mod: ,,, | Performed by: OBSTETRICS & GYNECOLOGY

## 2021-11-22 PROCEDURE — 27000716 HC OXISENSOR PROBE, ANY SIZE: Performed by: ANESTHESIOLOGY

## 2021-11-22 PROCEDURE — D9220A PRA ANESTHESIA: ICD-10-PCS | Mod: ANES,ICN,, | Performed by: ANESTHESIOLOGY

## 2021-11-22 PROCEDURE — 25000003 PHARM REV CODE 250: Performed by: OBSTETRICS & GYNECOLOGY

## 2021-11-22 PROCEDURE — 36000706: Performed by: OBSTETRICS & GYNECOLOGY

## 2021-11-22 PROCEDURE — 63600175 PHARM REV CODE 636 W HCPCS: Performed by: NURSE ANESTHETIST, CERTIFIED REGISTERED

## 2021-11-22 PROCEDURE — 71000015 HC POSTOP RECOV 1ST HR: Performed by: OBSTETRICS & GYNECOLOGY

## 2021-11-22 PROCEDURE — 37000009 HC ANESTHESIA EA ADD 15 MINS: Performed by: OBSTETRICS & GYNECOLOGY

## 2021-11-22 PROCEDURE — 25000003 PHARM REV CODE 250: Performed by: NURSE ANESTHETIST, CERTIFIED REGISTERED

## 2021-11-22 PROCEDURE — D9220A PRA ANESTHESIA: Mod: ANES,ICN,, | Performed by: ANESTHESIOLOGY

## 2021-11-22 PROCEDURE — 93010 ELECTROCARDIOGRAM REPORT: CPT | Mod: ,,, | Performed by: HOSPITALIST

## 2021-11-22 PROCEDURE — 93010 EKG 12-LEAD: ICD-10-PCS | Mod: ,,, | Performed by: HOSPITALIST

## 2021-11-22 PROCEDURE — 63600175 PHARM REV CODE 636 W HCPCS: Performed by: OBSTETRICS & GYNECOLOGY

## 2021-11-22 PROCEDURE — 64585 REV/RMV PERPH NSTIM ELTRD RA: CPT | Mod: ,,, | Performed by: OBSTETRICS & GYNECOLOGY

## 2021-11-22 PROCEDURE — 27201423 OPTIME MED/SURG SUP & DEVICES STERILE SUPPLY: Performed by: OBSTETRICS & GYNECOLOGY

## 2021-11-22 PROCEDURE — D9220A PRA ANESTHESIA: Mod: CRNA,,, | Performed by: NURSE ANESTHETIST, CERTIFIED REGISTERED

## 2021-11-22 PROCEDURE — 93005 ELECTROCARDIOGRAM TRACING: CPT

## 2021-11-22 PROCEDURE — 71000033 HC RECOVERY, INTIAL HOUR: Performed by: OBSTETRICS & GYNECOLOGY

## 2021-11-22 PROCEDURE — D9220A PRA ANESTHESIA: ICD-10-PCS | Mod: CRNA,,, | Performed by: NURSE ANESTHETIST, CERTIFIED REGISTERED

## 2021-11-22 PROCEDURE — 64595 PR REVISE/REMOVE PERIPH/GASTRIC NEUROSTIM/RECEIVER: ICD-10-PCS | Mod: 51,,, | Performed by: OBSTETRICS & GYNECOLOGY

## 2021-11-22 PROCEDURE — 37000008 HC ANESTHESIA 1ST 15 MINUTES: Performed by: OBSTETRICS & GYNECOLOGY

## 2021-11-22 PROCEDURE — 27000284 HC CANNULA NASAL: Performed by: ANESTHESIOLOGY

## 2021-11-22 PROCEDURE — 81003 URINALYSIS AUTO W/O SCOPE: CPT | Performed by: OBSTETRICS & GYNECOLOGY

## 2021-11-22 PROCEDURE — 64595 REV/RMV PRPH SAC/GSTR NPG/R: CPT | Mod: 51,,, | Performed by: OBSTETRICS & GYNECOLOGY

## 2021-11-22 RX ORDER — DICLOFENAC POTASSIUM 50 MG/1
50 TABLET, FILM COATED ORAL 4 TIMES DAILY
Qty: 40 TABLET | Refills: 0 | Status: SHIPPED | OUTPATIENT
Start: 2021-11-22 | End: 2021-12-02

## 2021-11-22 RX ORDER — ONDANSETRON 2 MG/ML
8 INJECTION INTRAMUSCULAR; INTRAVENOUS EVERY 6 HOURS PRN
Status: DISCONTINUED | OUTPATIENT
Start: 2021-11-22 | End: 2021-11-22 | Stop reason: HOSPADM

## 2021-11-22 RX ORDER — PHENYLEPHRINE HYDROCHLORIDE 10 MG/ML
INJECTION INTRAVENOUS
Status: DISCONTINUED | OUTPATIENT
Start: 2021-11-22 | End: 2021-11-22

## 2021-11-22 RX ORDER — HYDROMORPHONE HYDROCHLORIDE 2 MG/ML
0.5 INJECTION, SOLUTION INTRAMUSCULAR; INTRAVENOUS; SUBCUTANEOUS EVERY 5 MIN PRN
Status: DISCONTINUED | OUTPATIENT
Start: 2021-11-22 | End: 2021-11-22

## 2021-11-22 RX ORDER — SODIUM CHLORIDE 9 MG/ML
INJECTION, SOLUTION INTRAVENOUS CONTINUOUS
Status: DISCONTINUED | OUTPATIENT
Start: 2021-11-22 | End: 2021-11-22

## 2021-11-22 RX ORDER — LIDOCAINE HYDROCHLORIDE 10 MG/ML
INJECTION INFILTRATION; PERINEURAL
Status: DISCONTINUED | OUTPATIENT
Start: 2021-11-22 | End: 2021-11-22 | Stop reason: HOSPADM

## 2021-11-22 RX ORDER — ONDANSETRON 2 MG/ML
4 INJECTION INTRAMUSCULAR; INTRAVENOUS DAILY PRN
Status: DISCONTINUED | OUTPATIENT
Start: 2021-11-22 | End: 2021-11-22 | Stop reason: HOSPADM

## 2021-11-22 RX ORDER — MUPIROCIN 20 MG/G
OINTMENT TOPICAL
Status: DISCONTINUED | OUTPATIENT
Start: 2021-11-22 | End: 2021-11-22 | Stop reason: HOSPADM

## 2021-11-22 RX ORDER — DIPHENHYDRAMINE HYDROCHLORIDE 50 MG/ML
25 INJECTION INTRAMUSCULAR; INTRAVENOUS EVERY 6 HOURS PRN
Status: DISCONTINUED | OUTPATIENT
Start: 2021-11-22 | End: 2021-11-22 | Stop reason: HOSPADM

## 2021-11-22 RX ORDER — MEPERIDINE HYDROCHLORIDE 25 MG/ML
25 INJECTION INTRAMUSCULAR; INTRAVENOUS; SUBCUTANEOUS EVERY 10 MIN PRN
Status: DISCONTINUED | OUTPATIENT
Start: 2021-11-22 | End: 2021-11-22 | Stop reason: HOSPADM

## 2021-11-22 RX ORDER — CEFAZOLIN SODIUM 2 G/50ML
2 SOLUTION INTRAVENOUS
Status: DISCONTINUED | OUTPATIENT
Start: 2021-11-22 | End: 2021-11-22 | Stop reason: HOSPADM

## 2021-11-22 RX ORDER — TRAMADOL HYDROCHLORIDE 50 MG/1
50 TABLET ORAL EVERY 4 HOURS PRN
Status: DISCONTINUED | OUTPATIENT
Start: 2021-11-22 | End: 2021-11-22 | Stop reason: HOSPADM

## 2021-11-22 RX ORDER — LIDOCAINE HYDROCHLORIDE 20 MG/ML
INJECTION, SOLUTION EPIDURAL; INFILTRATION; INTRACAUDAL; PERINEURAL
Status: DISCONTINUED | OUTPATIENT
Start: 2021-11-22 | End: 2021-11-22

## 2021-11-22 RX ORDER — SODIUM CHLORIDE 9 MG/ML
INJECTION, SOLUTION INTRAVENOUS CONTINUOUS
Status: DISCONTINUED | OUTPATIENT
Start: 2021-11-22 | End: 2021-11-22 | Stop reason: HOSPADM

## 2021-11-22 RX ORDER — MORPHINE SULFATE 10 MG/ML
4 INJECTION INTRAMUSCULAR; INTRAVENOUS; SUBCUTANEOUS EVERY 5 MIN PRN
Status: DISCONTINUED | OUTPATIENT
Start: 2021-11-22 | End: 2021-11-22

## 2021-11-22 RX ORDER — LIDOCAINE HYDROCHLORIDE 10 MG/ML
1 INJECTION INFILTRATION; PERINEURAL ONCE
Status: DISCONTINUED | OUTPATIENT
Start: 2021-11-22 | End: 2021-11-22 | Stop reason: HOSPADM

## 2021-11-22 RX ORDER — SODIUM BICARBONATE 1 MEQ/ML
SYRINGE (ML) INTRAVENOUS
Status: DISCONTINUED | OUTPATIENT
Start: 2021-11-22 | End: 2021-11-22 | Stop reason: HOSPADM

## 2021-11-22 RX ORDER — SODIUM CHLORIDE, SODIUM LACTATE, POTASSIUM CHLORIDE, CALCIUM CHLORIDE 600; 310; 30; 20 MG/100ML; MG/100ML; MG/100ML; MG/100ML
INJECTION, SOLUTION INTRAVENOUS CONTINUOUS
Status: DISCONTINUED | OUTPATIENT
Start: 2021-11-22 | End: 2021-11-22 | Stop reason: HOSPADM

## 2021-11-22 RX ORDER — CEFAZOLIN SODIUM 1 G/3ML
INJECTION, POWDER, FOR SOLUTION INTRAMUSCULAR; INTRAVENOUS
Status: DISCONTINUED | OUTPATIENT
Start: 2021-11-22 | End: 2021-11-22

## 2021-11-22 RX ORDER — DIPHENHYDRAMINE HYDROCHLORIDE 50 MG/ML
25 INJECTION INTRAMUSCULAR; INTRAVENOUS EVERY 4 HOURS PRN
Status: DISCONTINUED | OUTPATIENT
Start: 2021-11-22 | End: 2021-11-22 | Stop reason: HOSPADM

## 2021-11-22 RX ORDER — PROPOFOL 10 MG/ML
VIAL (ML) INTRAVENOUS
Status: DISCONTINUED | OUTPATIENT
Start: 2021-11-22 | End: 2021-11-22

## 2021-11-22 RX ORDER — ONDANSETRON 2 MG/ML
4 INJECTION INTRAMUSCULAR; INTRAVENOUS EVERY 12 HOURS PRN
Status: DISCONTINUED | OUTPATIENT
Start: 2021-11-22 | End: 2021-11-22 | Stop reason: HOSPADM

## 2021-11-22 RX ADMIN — PROPOFOL 50 MG: 10 INJECTION, EMULSION INTRAVENOUS at 10:11

## 2021-11-22 RX ADMIN — PROPOFOL 50 MG: 10 INJECTION, EMULSION INTRAVENOUS at 09:11

## 2021-11-22 RX ADMIN — CEFAZOLIN 1000 MG: 1 INJECTION, POWDER, FOR SOLUTION INTRAMUSCULAR; INTRAVENOUS; PARENTERAL at 09:11

## 2021-11-22 RX ADMIN — LIDOCAINE HYDROCHLORIDE 50 MG: 20 INJECTION, SOLUTION EPIDURAL; INFILTRATION; INTRACAUDAL; PERINEURAL at 09:11

## 2021-11-22 RX ADMIN — PHENYLEPHRINE HYDROCHLORIDE 100 MCG: 10 INJECTION INTRAVENOUS at 10:11

## 2021-11-22 RX ADMIN — TRAMADOL HYDROCHLORIDE 50 MG: 50 TABLET, FILM COATED ORAL at 12:11

## 2021-11-22 RX ADMIN — SODIUM CHLORIDE, POTASSIUM CHLORIDE, SODIUM LACTATE AND CALCIUM CHLORIDE: 600; 310; 30; 20 INJECTION, SOLUTION INTRAVENOUS at 06:11

## 2021-11-22 RX ADMIN — ONDANSETRON 4 MG: 2 INJECTION INTRAMUSCULAR; INTRAVENOUS at 09:11

## 2021-12-01 RX ORDER — MIDAZOLAM HYDROCHLORIDE 1 MG/ML
INJECTION INTRAMUSCULAR; INTRAVENOUS
Status: DISCONTINUED | OUTPATIENT
Start: 2021-12-01 | End: 2021-12-01

## 2021-12-01 RX ORDER — FENTANYL CITRATE 50 UG/ML
INJECTION, SOLUTION INTRAMUSCULAR; INTRAVENOUS
Status: DISCONTINUED | OUTPATIENT
Start: 2021-12-01 | End: 2021-12-01

## 2021-12-01 RX ADMIN — MIDAZOLAM 2 MG: 1 INJECTION INTRAMUSCULAR; INTRAVENOUS at 08:12

## 2021-12-01 RX ADMIN — FENTANYL CITRATE 100 MCG: 50 INJECTION INTRAMUSCULAR; INTRAVENOUS at 08:12

## 2021-12-02 RX ORDER — POTASSIUM CHLORIDE 750 MG/1
10 CAPSULE, EXTENDED RELEASE ORAL 2 TIMES DAILY
Qty: 180 CAPSULE | Refills: 1 | Status: SHIPPED | OUTPATIENT
Start: 2021-12-02 | End: 2022-03-07

## 2022-01-10 ENCOUNTER — HOSPITAL ENCOUNTER (EMERGENCY)
Facility: HOSPITAL | Age: 82
Discharge: HOME | End: 2022-01-10
Payer: MEDICARE

## 2022-01-10 VITALS
SYSTOLIC BLOOD PRESSURE: 106 MMHG | DIASTOLIC BLOOD PRESSURE: 71 MMHG | HEIGHT: 67 IN | RESPIRATION RATE: 19 BRPM | WEIGHT: 90 LBS | HEART RATE: 95 BPM | TEMPERATURE: 97 F | OXYGEN SATURATION: 99 % | BODY MASS INDEX: 14.12 KG/M2

## 2022-01-10 DIAGNOSIS — S40.012A CONTUSION OF LEFT SHOULDER, INITIAL ENCOUNTER: ICD-10-CM

## 2022-01-10 DIAGNOSIS — S49.90XA SHOULDER INJURY: Primary | ICD-10-CM

## 2022-01-10 PROCEDURE — 99284 EMERGENCY DEPT VISIT MOD MDM: CPT | Mod: 25

## 2022-01-10 PROCEDURE — 99282 EMERGENCY DEPT VISIT SF MDM: CPT | Performed by: FAMILY MEDICINE

## 2022-01-10 NOTE — ED PROVIDER NOTES
Encounter Date: 1/10/2022       History     Chief Complaint   Patient presents with    Fall     Slip and fall onto night stand. Pain in posterior scalp and left shoulder/scapula. Painful ROM in left shoulder. Small abrasion to right posterior scalp.     Was a slip and fall into the night stand.  Comes in with a pain in the left shoulder.  Small abrasion to the right anterior scalp.  No memories of loss of consciousness.        Review of patient's allergies indicates:   Allergen Reactions    Sulfa (sulfonamide antibiotics) Rash     Past Medical History:   Diagnosis Date    Dementia     Depression     Hypertension     Hypotension     Hypothyroid     OAB (overactive bladder)     chronic    Parkinson's disease      Past Surgical History:   Procedure Laterality Date    CHOLECYSTECTOMY      ESOPHAGEAL DILATION      Interstim electrode test insertion with x-ray imaging   08/10/2020    at [facility];  test implant at S3 without difficulty;  2.0 mA was the testing and point;  no complications    KNEE SURGERY      LUMPECTOMY,BREAST, WITH RADIOACTIVE SEED LOCALIZATION AND SENTINEL LYMPH NODE BIOPSY      REMOVAL OF SACRAL NEUROSTIMULATOR DEVICE Left 11/22/2021    Procedure: REMOVAL, NEUROSTIMULATOR, SACRAL;  Surgeon: Reza Murphy MD;  Location: [facility];  Service: Pain Management;  Laterality: Left;    SINUS SURGERY      TOTAL ABDOMINAL HYSTERECTOMY W/ BILATERAL SALPINGOOPHORECTOMY       Family History   Problem Relation Age of Onset    Heart disease Father     Heart disease Mother      Social History     Tobacco Use    Smoking status: Never Smoker    Smokeless tobacco: Never Used   Substance Use Topics    Alcohol use: Not Currently    Drug use: Never     Review of Systems   Constitutional: Negative.  Negative for fever.   HENT: Negative.  Negative for sore throat.    Eyes: Negative.    Respiratory: Negative.  Negative for shortness of breath.    Cardiovascular:  Negative.  Negative for chest pain.   Gastrointestinal: Negative.  Negative for nausea.   Endocrine: Negative.    Genitourinary: Negative.  Negative for dysuria.   Musculoskeletal: Negative.  Negative for back pain.   Skin: Negative.  Negative for rash.   Allergic/Immunologic: Negative.    Neurological: Negative.  Negative for weakness.   Hematological: Negative.  Does not bruise/bleed easily.   Psychiatric/Behavioral: Negative.    All other systems reviewed and are negative.      Physical Exam     Initial Vitals [01/10/22 1600]   BP Pulse Resp Temp SpO2   130/74 86 20 97.3 °F (36.3 °C) 98 %      MAP       --         Physical Exam    Constitutional: She appears well-developed and well-nourished.   HENT:   Head: Normocephalic and atraumatic.   Right Ear: External ear normal.   Left Ear: External ear normal.   Nose: Nose normal.   Mouth/Throat: Oropharynx is clear and moist.   Eyes: Conjunctivae and EOM are normal. Pupils are equal, round, and reactive to light.   Neck: Neck supple.   Normal range of motion.  Cardiovascular: Normal rate, regular rhythm, normal heart sounds and intact distal pulses.   Pulmonary/Chest: Breath sounds normal.   Abdominal: Abdomen is soft. Bowel sounds are normal.   Genitourinary:    Vagina and uterus normal.     Musculoskeletal:         General: Normal range of motion.      Cervical back: Normal range of motion and neck supple.     Neurological: She is alert and oriented to person, place, and time. She has normal strength and normal reflexes.   Skin: Skin is warm. Capillary refill takes less than 2 seconds.   Psychiatric: She has a normal mood and affect. Her behavior is normal. Judgment and thought content normal.         Medical Screening Exam   See Full Note    ED Course   Procedures  Labs Reviewed - No data to display       Imaging Results          CT Head Without Contrast (Final result)  Result time 01/10/22 18:07:35    Final result by Emmett Nick MD (01/10/22 18:07:35)                  Impression:      1. No acute intracranial abnormality.    2. Mild generalized atrophy and findings suggestive of sequela of chronic microvascular ischemia.    Place of service: [facility]      Electronically signed by: Emmett Nick  Date:    01/10/2022  Time:    18:07             Narrative:    EXAMINATION:  CT HEAD WITHOUT CONTRAST    CLINICAL HISTORY:  Head trauma, minor (Age >= 65y);    TECHNIQUE:  Axial CT imaging from the vertex to skull the skull base was performed without contrast. Total DLP: 700 mGy*cm    Dose reduction:    The CT exam was performed using one or more dose reduction techniques: Automatic exposure control, automated adjustment of the MA and/or kVP according to patient size, or use of iterative reconstruction technique.    COMPARISON:  06/08/2020.    FINDINGS:  Mild atrophy is noted with prominence of sulci and ventricles.  The basilar cisterns are within normal limits in appearance. There is no evidence of hydrocephalus, midline shift or mass effect.  Minimal periventricular hypodensity changes are noted, which are nonspecific but similar to prior and are suggestive of sequela of chronic microvascular ischemia.  Otherwise, gray and white matter differentiation is preserved. There is no CT evidence of acute intracranial hemorrhage or infarction.    The calvarium is intact. The visualized orbits and globes appear within normal limits. The paranasal sinuses and mastoid air cells are predominantly clear. Scalp soft tissues appear unremarkable.                               X-Ray Shoulder 2 or More Views Left (Final result)  Result time 01/10/22 18:08:44   Procedure changed from X-Ray Shoulder Trauma Left     Final result by Emmett Nick MD (01/10/22 18:08:44)                 Impression:      No acute radiographic abnormality.    Place of service: [facility]      Electronically signed by: Emmett Nick  Date:    01/10/2022  Time:    18:08             Narrative:     EXAMINATION:  XR shoulder complete two views left    CLINICAL HISTORY:  Left shoulder pain    COMPARISON:  None available    FINDINGS:  There is no acute osseous, articular or soft tissue abnormality identified.  The acromioclavicular joint demonstrates minimal osteophytic spurring.  No suspicious osseous or soft tissue lesions are identified.  Mild osteopenia is noted.    The left shoulder joint space appears relatively preserved.                                 Medications - No data to display                    Clinical Impression:   Final diagnoses:  [S40.012A] Contusion of left shoulder, initial encounter          ED Disposition Condition    Discharge Stable        ED Prescriptions     None        Follow-up Information    None

## 2022-01-11 ENCOUNTER — TELEPHONE (OUTPATIENT)
Dept: EMERGENCY MEDICINE | Facility: HOSPITAL | Age: 82
End: 2022-01-11
Payer: MEDICARE

## 2022-01-26 RX ORDER — NITROFURANTOIN 25; 75 MG/1; MG/1
100 CAPSULE ORAL 2 TIMES DAILY
Qty: 180 CAPSULE | Refills: 0 | Status: SHIPPED | OUTPATIENT
Start: 2022-01-26 | End: 2022-04-26

## 2022-02-09 ENCOUNTER — PATIENT OUTREACH (OUTPATIENT)
Dept: FAMILY MEDICINE | Facility: CLINIC | Age: 82
End: 2022-02-09
Payer: MEDICARE

## 2022-02-09 ENCOUNTER — OFFICE VISIT (OUTPATIENT)
Dept: FAMILY MEDICINE | Facility: CLINIC | Age: 82
End: 2022-02-09
Payer: MEDICARE

## 2022-02-09 VITALS
HEART RATE: 94 BPM | DIASTOLIC BLOOD PRESSURE: 72 MMHG | BODY MASS INDEX: 14.5 KG/M2 | TEMPERATURE: 98 F | RESPIRATION RATE: 20 BRPM | SYSTOLIC BLOOD PRESSURE: 100 MMHG | HEIGHT: 67 IN | OXYGEN SATURATION: 95 % | WEIGHT: 92.38 LBS

## 2022-02-09 DIAGNOSIS — I10 ESSENTIAL HYPERTENSION: ICD-10-CM

## 2022-02-09 DIAGNOSIS — R35.0 URINARY FREQUENCY: ICD-10-CM

## 2022-02-09 DIAGNOSIS — R22.0 FACIAL SWELLING: ICD-10-CM

## 2022-02-09 DIAGNOSIS — E03.8 OTHER SPECIFIED HYPOTHYROIDISM: ICD-10-CM

## 2022-02-09 DIAGNOSIS — G20.A1 PARKINSON'S DISEASE: Primary | ICD-10-CM

## 2022-02-09 DIAGNOSIS — M15.9 PRIMARY OSTEOARTHRITIS INVOLVING MULTIPLE JOINTS: ICD-10-CM

## 2022-02-09 LAB
ALBUMIN SERPL BCP-MCNC: 4 G/DL (ref 3.5–5)
ALBUMIN/GLOB SERPL: 1.1 {RATIO}
ALP SERPL-CCNC: 73 U/L (ref 55–142)
ALT SERPL W P-5'-P-CCNC: 18 U/L (ref 13–56)
ANION GAP SERPL CALCULATED.3IONS-SCNC: 9 MMOL/L (ref 7–16)
AST SERPL W P-5'-P-CCNC: 18 U/L (ref 15–37)
BASOPHILS # BLD AUTO: 0.03 K/UL (ref 0–0.2)
BASOPHILS NFR BLD AUTO: 0.5 % (ref 0–1)
BILIRUB SERPL-MCNC: 0.3 MG/DL (ref 0–1.2)
BUN SERPL-MCNC: 23 MG/DL (ref 7–18)
BUN/CREAT SERPL: 21 (ref 6–20)
CALCIUM SERPL-MCNC: 9 MG/DL (ref 8.5–10.1)
CHLORIDE SERPL-SCNC: 105 MMOL/L (ref 98–107)
CHOLEST SERPL-MCNC: 214 MG/DL (ref 0–200)
CHOLEST/HDLC SERPL: 2.6 {RATIO}
CO2 SERPL-SCNC: 28 MMOL/L (ref 21–32)
CREAT SERPL-MCNC: 1.11 MG/DL (ref 0.55–1.02)
DIFFERENTIAL METHOD BLD: ABNORMAL
EOSINOPHIL # BLD AUTO: 0.1 K/UL (ref 0–0.5)
EOSINOPHIL NFR BLD AUTO: 1.8 % (ref 1–4)
ERYTHROCYTE [DISTWIDTH] IN BLOOD BY AUTOMATED COUNT: 12.5 % (ref 11.5–14.5)
GLOBULIN SER-MCNC: 3.8 G/DL (ref 2–4)
GLUCOSE SERPL-MCNC: 103 MG/DL (ref 74–106)
HCT VFR BLD AUTO: 38.4 % (ref 38–47)
HDLC SERPL-MCNC: 82 MG/DL (ref 40–60)
HGB BLD-MCNC: 12.7 G/DL (ref 12–16)
IMM GRANULOCYTES # BLD AUTO: 0.01 K/UL (ref 0–0.04)
IMM GRANULOCYTES NFR BLD: 0.2 % (ref 0–0.4)
LDLC SERPL CALC-MCNC: 108 MG/DL
LDLC/HDLC SERPL: 1.3 {RATIO}
LYMPHOCYTES # BLD AUTO: 1.45 K/UL (ref 1–4.8)
LYMPHOCYTES NFR BLD AUTO: 26.1 % (ref 27–41)
MCH RBC QN AUTO: 31.8 PG (ref 27–31)
MCHC RBC AUTO-ENTMCNC: 33.1 G/DL (ref 32–36)
MCV RBC AUTO: 96.2 FL (ref 80–96)
MONOCYTES # BLD AUTO: 0.42 K/UL (ref 0–0.8)
MONOCYTES NFR BLD AUTO: 7.6 % (ref 2–6)
MPC BLD CALC-MCNC: 10.5 FL (ref 9.4–12.4)
NEUTROPHILS # BLD AUTO: 3.54 K/UL (ref 1.8–7.7)
NEUTROPHILS NFR BLD AUTO: 63.8 % (ref 53–65)
NONHDLC SERPL-MCNC: 132 MG/DL
NRBC # BLD AUTO: 0 X10E3/UL
NRBC, AUTO (.00): 0 %
PLATELET # BLD AUTO: 318 K/UL (ref 150–400)
POTASSIUM SERPL-SCNC: 4.7 MMOL/L (ref 3.5–5.1)
PROT SERPL-MCNC: 7.8 G/DL (ref 6.4–8.2)
RBC # BLD AUTO: 3.99 M/UL (ref 4.2–5.4)
SODIUM SERPL-SCNC: 137 MMOL/L (ref 136–145)
TRIGL SERPL-MCNC: 122 MG/DL (ref 35–150)
TSH SERPL DL<=0.005 MIU/L-ACNC: 0.26 UIU/ML (ref 0.36–3.74)
VLDLC SERPL-MCNC: 24 MG/DL
WBC # BLD AUTO: 5.55 K/UL (ref 4.5–11)

## 2022-02-09 PROCEDURE — 96372 THER/PROPH/DIAG INJ SC/IM: CPT | Mod: ,,, | Performed by: FAMILY MEDICINE

## 2022-02-09 PROCEDURE — 80053 COMPREHENSIVE METABOLIC PANEL: ICD-10-PCS | Mod: ,,, | Performed by: CLINICAL MEDICAL LABORATORY

## 2022-02-09 PROCEDURE — 85025 COMPLETE CBC W/AUTO DIFF WBC: CPT | Mod: ,,, | Performed by: CLINICAL MEDICAL LABORATORY

## 2022-02-09 PROCEDURE — 80061 LIPID PANEL: ICD-10-PCS | Mod: ,,, | Performed by: CLINICAL MEDICAL LABORATORY

## 2022-02-09 PROCEDURE — 85025 CBC WITH DIFFERENTIAL: ICD-10-PCS | Mod: ,,, | Performed by: CLINICAL MEDICAL LABORATORY

## 2022-02-09 PROCEDURE — 80053 COMPREHEN METABOLIC PANEL: CPT | Mod: ,,, | Performed by: CLINICAL MEDICAL LABORATORY

## 2022-02-09 PROCEDURE — 96372 PR INJECTION,THERAP/PROPH/DIAG2ST, IM OR SUBCUT: ICD-10-PCS | Mod: ,,, | Performed by: FAMILY MEDICINE

## 2022-02-09 PROCEDURE — 84443 TSH: ICD-10-PCS | Mod: ,,, | Performed by: CLINICAL MEDICAL LABORATORY

## 2022-02-09 PROCEDURE — 99213 PR OFFICE/OUTPT VISIT, EST, LEVL III, 20-29 MIN: ICD-10-PCS | Mod: ,,, | Performed by: FAMILY MEDICINE

## 2022-02-09 PROCEDURE — 80061 LIPID PANEL: CPT | Mod: ,,, | Performed by: CLINICAL MEDICAL LABORATORY

## 2022-02-09 PROCEDURE — 99213 OFFICE O/P EST LOW 20 MIN: CPT | Mod: ,,, | Performed by: FAMILY MEDICINE

## 2022-02-09 PROCEDURE — 84443 ASSAY THYROID STIM HORMONE: CPT | Mod: ,,, | Performed by: CLINICAL MEDICAL LABORATORY

## 2022-02-09 RX ORDER — KETOROLAC TROMETHAMINE 30 MG/ML
30 INJECTION, SOLUTION INTRAMUSCULAR; INTRAVENOUS
Status: COMPLETED | OUTPATIENT
Start: 2022-02-09 | End: 2022-02-09

## 2022-02-09 RX ORDER — ONDANSETRON 4 MG/1
4 TABLET, ORALLY DISINTEGRATING ORAL EVERY 12 HOURS PRN
Qty: 60 TABLET | Refills: 1 | Status: SHIPPED | OUTPATIENT
Start: 2022-02-09 | End: 2022-05-09 | Stop reason: SDUPTHER

## 2022-02-09 RX ADMIN — KETOROLAC TROMETHAMINE 30 MG: 30 INJECTION, SOLUTION INTRAMUSCULAR; INTRAVENOUS at 03:02

## 2022-02-09 NOTE — PROGRESS NOTES
"Clinic Note    Patient Name: Rosa Alejo  : 1940  MRN: [id number]    HPI:    Chief Complaint   Patient presents with    Follow-up     Three month follow up     Ms. Rosa Alejo is a 81 y.o. female who present to clinic today with CC of follow up on chronic disease processes including Parkinson's Disease, dementia, HTN, hypothyroidism, and OAB.   Patient also has a h/o chronic pain related to osteoarthritis and fibromyalgia. She reports she is having a flare with chronic pain today. She requests something for flare with pain today.  She reports she has been having some pain and swelling in and around her mouth/face. She has seen Dr. Ceballos (Dentist in [place]) and was subsequently referred to Dr. Deluna (oral surgeon in [place], [place]. She reports she has had some dental work recently but was then referred to ENT because the dentist felt like the problem was "in my glands". States she saw Dr. Paez (ENT) at [place] and was told she had a "stone in my gland" that needed to be removed but he did not have equipment to remove it. States this visit was in 2021. Reports she was subsequently referred to ENT at [facility] (Dr. Suarez). Patient and caregiver report that they have called both locations and cannot get an appt. Request new referral to Dr. Suarez ENT at [facility].  Patient also requests referral to Dr. Pepper (Neurology in [place], [place]) for issues she is having with her parkinson's (tremor, blood pressure fluctuations, and hallucinations) as she is no longer able to travel to [facility].  Patient reports blood pressure has been up and down as she does have a h/o HTN and orthostatic hypotension. States, however, she feels like this is well controlled/stable.   She reports chronic issues are, otherwise, well controlled.  Denies any problems or side effects with medications.   Patient reports a recent flare with chronic OA related pain and fibromyalgia. She requests an injection for pain today.  Patient reports her " urine has been dark with some associated dysuria/frequency.   Patient is, otherwise, without complaints.    Medications:  Current Outpatient Medications on File Prior to Visit   Medication Sig Dispense Refill    calcium carbonate-vitamin D3 (CALTRATE 600 PLUS D) 600 mg (1,500 mg)-800 unit Chew       carbidopa-levodopa  mg (SINEMET)  mg per tablet Take by mouth.      cetirizine (ZYRTEC) 10 MG tablet Take 1 tablet (10 mg total) by mouth once daily. 90 tablet 1    cholecalciferol, vitamin D3, (VITAMIN D3) 50 mcg (2,000 unit) Cap       donepeziL (ARICEPT) 10 MG tablet       ibuprofen (ADVIL,MOTRIN) 800 MG tablet Take 0.5 tablets (400 mg total) by mouth every 6 (six) hours as needed for Pain. 30 tablet 1    levothyroxine (SYNTHROID) 100 MCG tablet Take 1 tablet (100 mcg total) by mouth before breakfast. 90 tablet 1    linaCLOtide (LINZESS) 145 mcg Cap capsule Take 1 capsule by mouth.      nitrofurantoin, macrocrystal-monohydrate, (MACROBID) 100 MG capsule Take 1 capsule (100 mg total) by mouth 2 (two) times daily. 180 capsule 0    montelukast (SINGULAIR) 10 mg tablet Take by mouth.      pantoprazole (PROTONIX) 40 MG tablet       potassium chloride (MICRO-K) 10 MEQ CpSR Take 1 capsule (10 mEq total) by mouth 2 (two) times daily. 180 capsule 1    pyridostigmine (MESTINON) 60 mg Tab 30 mg 2 (two) times a day.       traMADoL (ULTRAM) 50 mg tablet       venlafaxine (EFFEXOR-XR) 75 MG 24 hr capsule Take 75 mg by mouth once daily.        No current facility-administered medications on file prior to visit.         Allergies: Sulfa (sulfonamide antibiotics)      Past Medical History:    Past Medical History:   Diagnosis Date    Dementia     Depression     Hypertension     Hypotension     Hypothyroid     OAB (overactive bladder)     chronic    Parkinson's disease        Past Surgical History:    Past Surgical History:   Procedure Laterality Date    CHOLECYSTECTOMY      ESOPHAGEAL DILATION       "Interstim electrode test insertion with x-ray imaging   08/10/2020    at [facility];  test implant at S3 without difficulty;  2.0 mA was the testing and point;  no complications    KNEE SURGERY      LUMPECTOMY,BREAST, WITH RADIOACTIVE SEED LOCALIZATION AND SENTINEL LYMPH NODE BIOPSY      REMOVAL OF SACRAL NEUROSTIMULATOR DEVICE Left 11/22/2021    Procedure: REMOVAL, NEUROSTIMULATOR, SACRAL;  Surgeon: Reza Murphy MD;  Location[facility];  Service: Pain Management;  Laterality: Left;    SINUS SURGERY      TOTAL ABDOMINAL HYSTERECTOMY W/ BILATERAL SALPINGOOPHORECTOMY           Social History:    Social History     Tobacco Use   Smoking Status Never Smoker   Smokeless Tobacco Never Used     Social History     Substance and Sexual Activity   Alcohol Use Not Currently     Social History     Substance and Sexual Activity   Drug Use Never         Family History:    Family History   Problem Relation Age of Onset    Heart disease Father     Heart disease Mother        Review of Systems:    Review of Systems   Constitutional: Positive for fatigue. Negative for appetite change, chills, fever and unexpected weight change.   Eyes: Negative for visual disturbance.   Respiratory: Negative for cough and shortness of breath.    Cardiovascular: Negative for chest pain and leg swelling.   Gastrointestinal: Negative for abdominal pain, change in bowel habit, constipation, diarrhea, nausea, vomiting and change in bowel habit.   Genitourinary: Positive for frequency.   Musculoskeletal: Positive for arthralgias.   Integumentary:  Negative for rash.   Neurological: Negative for dizziness and headaches.   Psychiatric/Behavioral: Negative for suicidal ideas. The patient is not nervous/anxious.         Vitals:    /72 (BP Location: Right arm, Patient Position: Sitting, BP Method: Small (Automatic))   Pulse 94   Temp 98.1 °F (36.7 °C) (Oral)   Resp 20   Ht 5' 7" (1.702 m)   Wt 41.9 kg (92 " lb 6.4 oz)   SpO2 95%   BMI 14.47 kg/m²        Physical Exam:    Physical Exam  Constitutional:       General: She is not in acute distress.     Comments: Appears chronically ill but stable and in no acute distress   HENT:      Nose: Nose normal.      Mouth/Throat:      Mouth: Mucous membranes are moist.      Pharynx: Oropharynx is clear.   Eyes:      Conjunctiva/sclera: Conjunctivae normal.   Cardiovascular:      Rate and Rhythm: Normal rate and regular rhythm.      Heart sounds: Normal heart sounds. No murmur heard.      Pulmonary:      Effort: Pulmonary effort is normal. No respiratory distress.      Breath sounds: Normal breath sounds. No wheezing, rhonchi or rales.   Abdominal:      General: Bowel sounds are normal.      Palpations: Abdomen is soft.      Tenderness: There is no abdominal tenderness.   Musculoskeletal:      Cervical back: Neck supple.   Skin:     Findings: No rash.   Neurological:      General: No focal deficit present.      Mental Status: She is alert. Mental status is at baseline.      Comments: In wheelchair   Psychiatric:         Mood and Affect: Mood normal.         Assessment/Plan:   Parkinson's disease  -     Ambulatory referral/consult to Neurology; Future; Expected date: 02/16/2022 - Dr. Pepper per patient request. She was previously followed at [facility] but is unable to travel to [facility].     Primary osteoarthritis involving multiple joints  -     ketorolac injection 30 mg    Essential hypertension  -     CBC Auto Differential; Future; Expected date: 02/09/2022  -     Comprehensive Metabolic Panel; Future; Expected date: 02/09/2022  -     Lipid Panel; Future; Expected date: 02/09/2022    Other specified hypothyroidism  -     CBC Auto Differential; Future; Expected date: 02/09/2022  -     Comprehensive Metabolic Panel; Future; Expected date: 02/09/2022  -     TSH; Future; Expected date: 02/09/2022    Facial swelling  -     Ambulatory referral/consult to ENT; Future; Expected date: 02/18/2022 -  records requested from [facility] (Dr. Paez) to be faxed with referral to [facility]. There is no significant facial swelling appreciated on exam at this time but patient reports that this has been an ongoing intermittent issue for months and that she needs some relief from this issue.    Urinary frequency  -     POCT URINALYSIS W/O SCOPE  - Patient unable to leave urine sample today. She was provided with specimen cup to return sample to clinic.     Other orders  -     ondansetron (ZOFRAN-ODT) 4 MG TbDL; Take 1 tablet (4 mg total) by mouth every 12 (twelve) hours as needed (chronic nausea).  Dispense: 60 tablet; Refill: 1 - patient has chronic, intermittent issues with nausea -  zofran refilled for prn use.    RTC in 3 months for follow up on chronic disease processes.  RTC sooner if needed.   Patient voiced understanding and is agreeable to plan.      Rosa Miller MD    Family Medicine

## 2022-02-11 PROBLEM — R32 INCONTINENCE: Status: ACTIVE | Noted: 2022-02-11

## 2022-02-14 ENCOUNTER — TELEPHONE (OUTPATIENT)
Dept: FAMILY MEDICINE | Facility: CLINIC | Age: 82
End: 2022-02-14
Payer: MEDICARE

## 2022-02-14 NOTE — TELEPHONE ENCOUNTER
Referral placed at last office visit. Appt scheduled for February 25 at 1:30p.m. with Dr. Suarez at [facility]. [address] [place] [address] [address]. [phone](P) [phone](F). Pt notified of appt date and time. Pt voiced understanding. Letter Mailed.

## 2022-02-23 ENCOUNTER — OFFICE VISIT (OUTPATIENT)
Dept: OTOLARYNGOLOGY | Facility: CLINIC | Age: 82
End: 2022-02-23
Payer: MEDICARE

## 2022-02-23 VITALS — WEIGHT: 92 LBS | HEIGHT: 67 IN | BODY MASS INDEX: 14.44 KG/M2

## 2022-02-23 DIAGNOSIS — K11.20 SIALOADENITIS: Primary | ICD-10-CM

## 2022-02-23 DIAGNOSIS — R22.0 FACIAL SWELLING: ICD-10-CM

## 2022-02-23 PROCEDURE — 99204 OFFICE O/P NEW MOD 45 MIN: CPT | Mod: S$PBB,,, | Performed by: OTOLARYNGOLOGY

## 2022-02-23 PROCEDURE — 99214 OFFICE O/P EST MOD 30 MIN: CPT | Mod: PBBFAC | Performed by: OTOLARYNGOLOGY

## 2022-02-23 PROCEDURE — 99204 PR OFFICE/OUTPT VISIT, NEW, LEVL IV, 45-59 MIN: ICD-10-PCS | Mod: S$PBB,,, | Performed by: OTOLARYNGOLOGY

## 2022-02-23 RX ORDER — LEVOTHYROXINE SODIUM 88 UG/1
88 TABLET ORAL
Qty: 90 TABLET | Refills: 1 | Status: SHIPPED | OUTPATIENT
Start: 2022-02-23 | End: 2022-08-10 | Stop reason: SDUPTHER

## 2022-02-23 RX ORDER — LEVOTHYROXINE SODIUM 88 UG/1
88 TABLET ORAL
COMMUNITY
End: 2022-02-23 | Stop reason: DRUGHIGH

## 2022-02-23 NOTE — PROGRESS NOTES
Subjective:       Patient ID: Rosa Alejo is a 81 y.o. female.    Chief Complaint: Other (Patient is here for pain in her face.)    HPI  Review of Systems   HENT: Positive for facial swelling.    All other systems reviewed and are negative.      Objective:      Physical Exam  General: NAD  Head: Normocephalic, atraumatic, no facial asymmetry/normal strength,  Ears: Both auricules normal in appearance, w/o deformities tympanic membranes normal external auditory canals normal  Nose: External nose w/o deformities normal turbinates no drainage or inflammation  Oral Cavity: Lips, gums, floor of mouth, tongue hard palate, and buccal mucosa without mass/lesion  Oropharynx: Mucosa pink and moist, soft palate, posterior pharynx and oropharyngeal wall without mass/lesion  Neck: Supple, symmetric, trachea midline, no palpable mass/lesion, no palpable cervical lymphadenopathy  Skin: Warm and dry, no concerning lesions  Respiratory: Respirations even, unlabored    Assessment:       1. Sialoadenitis    2. Facial swelling        Plan:       Reviewed old records in detail no stone or swelling seen today will encourage hydration probable a side effect of medications

## 2022-03-09 ENCOUNTER — LAB VISIT (OUTPATIENT)
Dept: LAB | Facility: CLINIC | Age: 82
End: 2022-03-09
Payer: MEDICARE

## 2022-03-09 DIAGNOSIS — R30.0 DYSURIA: Primary | ICD-10-CM

## 2022-03-09 LAB
BILIRUB SERPL-MCNC: NEGATIVE MG/DL
BLOOD URINE, POC: NEGATIVE
COLOR, POC UA: YELLOW
GLUCOSE UR QL STRIP: NEGATIVE
KETONES UR QL STRIP: NORMAL
LEUKOCYTE ESTERASE URINE, POC: NEGATIVE
NITRITE, POC UA: NEGATIVE
PH, POC UA: 5
PROTEIN, POC: NORMAL
SPECIFIC GRAVITY, POC UA: 1.02
UROBILINOGEN, POC UA: 0.2

## 2022-03-09 PROCEDURE — 87086 CULTURE, URINE: ICD-10-PCS | Mod: ,,, | Performed by: CLINICAL MEDICAL LABORATORY

## 2022-03-09 PROCEDURE — 87186 SC STD MICRODIL/AGAR DIL: CPT | Mod: ,,, | Performed by: CLINICAL MEDICAL LABORATORY

## 2022-03-09 PROCEDURE — 81003 URINALYSIS AUTO W/O SCOPE: CPT | Mod: RHCUB | Performed by: FAMILY MEDICINE

## 2022-03-09 PROCEDURE — 87186 CULTURE, URINE: ICD-10-PCS | Mod: ,,, | Performed by: CLINICAL MEDICAL LABORATORY

## 2022-03-09 PROCEDURE — 87077 CULTURE AEROBIC IDENTIFY: CPT | Mod: ,,, | Performed by: CLINICAL MEDICAL LABORATORY

## 2022-03-09 PROCEDURE — 87077 CULTURE, URINE: ICD-10-PCS | Mod: ,,, | Performed by: CLINICAL MEDICAL LABORATORY

## 2022-03-09 PROCEDURE — 87086 URINE CULTURE/COLONY COUNT: CPT | Mod: ,,, | Performed by: CLINICAL MEDICAL LABORATORY

## 2022-03-10 RX ORDER — POTASSIUM CHLORIDE 750 MG/1
10 CAPSULE, EXTENDED RELEASE ORAL 2 TIMES DAILY
Qty: 360 CAPSULE | Refills: 1 | Status: SHIPPED | OUTPATIENT
Start: 2022-03-10 | End: 2022-05-09 | Stop reason: SDUPTHER

## 2022-03-11 LAB — UA COMPLETE W REFLEX CULTURE PNL UR: ABNORMAL

## 2022-03-14 ENCOUNTER — TELEPHONE (OUTPATIENT)
Dept: FAMILY MEDICINE | Facility: CLINIC | Age: 82
End: 2022-03-14
Payer: MEDICARE

## 2022-03-14 DIAGNOSIS — R35.0 URINARY FREQUENCY: Primary | ICD-10-CM

## 2022-03-14 RX ORDER — CEPHALEXIN 500 MG/1
500 CAPSULE ORAL EVERY 12 HOURS
Qty: 14 CAPSULE | Refills: 0 | Status: SHIPPED | OUTPATIENT
Start: 2022-03-14 | End: 2022-03-21

## 2022-03-17 ENCOUNTER — TELEPHONE (OUTPATIENT)
Dept: FAMILY MEDICINE | Facility: CLINIC | Age: 82
End: 2022-03-17
Payer: MEDICARE

## 2022-04-06 ENCOUNTER — LAB VISIT (OUTPATIENT)
Dept: LAB | Facility: CLINIC | Age: 82
End: 2022-04-06
Payer: MEDICARE

## 2022-04-06 DIAGNOSIS — N39.0 URINARY TRACT INFECTION WITHOUT HEMATURIA, SITE UNSPECIFIED: Primary | ICD-10-CM

## 2022-04-06 LAB
BACTERIA #/AREA URNS HPF: ABNORMAL /HPF
HYALINE CASTS #/AREA URNS LPF: ABNORMAL /LPF
RBC #/AREA URNS HPF: ABNORMAL /HPF
SQUAMOUS #/AREA URNS LPF: ABNORMAL /LPF
WBC #/AREA URNS HPF: ABNORMAL /HPF

## 2022-04-06 PROCEDURE — 81001 URINALYSIS, MICROSCOPIC: ICD-10-PCS | Mod: ,,, | Performed by: CLINICAL MEDICAL LABORATORY

## 2022-04-06 PROCEDURE — 81001 URINALYSIS AUTO W/SCOPE: CPT | Mod: ,,, | Performed by: CLINICAL MEDICAL LABORATORY

## 2022-05-02 RX ORDER — CETIRIZINE HYDROCHLORIDE 10 MG/1
10 TABLET ORAL DAILY
Qty: 90 TABLET | Refills: 1 | Status: SHIPPED | OUTPATIENT
Start: 2022-05-02 | End: 2022-12-08 | Stop reason: SDUPTHER

## 2022-05-02 NOTE — TELEPHONE ENCOUNTER
----- Message from Jose Roberto Fernandez sent at 5/2/2022  9:39 AM CDT -----  Regarding: med refilled  Pt needs this med refilled (ZYRTEC and send to [facility] pt already has appt

## 2022-05-09 ENCOUNTER — OFFICE VISIT (OUTPATIENT)
Dept: FAMILY MEDICINE | Facility: CLINIC | Age: 82
End: 2022-05-09
Payer: MEDICARE

## 2022-05-09 VITALS
OXYGEN SATURATION: 99 % | HEIGHT: 67 IN | SYSTOLIC BLOOD PRESSURE: 98 MMHG | TEMPERATURE: 97 F | WEIGHT: 97 LBS | BODY MASS INDEX: 15.22 KG/M2 | RESPIRATION RATE: 18 BRPM | DIASTOLIC BLOOD PRESSURE: 62 MMHG | HEART RATE: 88 BPM

## 2022-05-09 DIAGNOSIS — R35.0 URINARY FREQUENCY: ICD-10-CM

## 2022-05-09 DIAGNOSIS — E87.6 HYPOKALEMIA: Primary | ICD-10-CM

## 2022-05-09 DIAGNOSIS — E03.8 OTHER SPECIFIED HYPOTHYROIDISM: ICD-10-CM

## 2022-05-09 PROBLEM — N32.81 OAB (OVERACTIVE BLADDER): Chronic | Status: ACTIVE | Noted: 2021-04-20

## 2022-05-09 PROBLEM — R32 INCONTINENCE: Chronic | Status: ACTIVE | Noted: 2022-02-11

## 2022-05-09 PROBLEM — E03.9 HYPOTHYROIDISM: Chronic | Status: ACTIVE | Noted: 2021-09-30

## 2022-05-09 LAB
ANION GAP SERPL CALCULATED.3IONS-SCNC: 12 MMOL/L (ref 7–16)
BACTERIA #/AREA URNS HPF: ABNORMAL /HPF
BILIRUB SERPL-MCNC: NEGATIVE MG/DL
BLOOD URINE, POC: NEGATIVE
BUN SERPL-MCNC: 22 MG/DL (ref 7–18)
BUN/CREAT SERPL: 18 (ref 6–20)
CALCIUM SERPL-MCNC: 9.1 MG/DL (ref 8.5–10.1)
CHLORIDE SERPL-SCNC: 105 MMOL/L (ref 98–107)
CO2 SERPL-SCNC: 23 MMOL/L (ref 21–32)
COLOR, POC UA: YELLOW
CREAT SERPL-MCNC: 1.22 MG/DL (ref 0.55–1.02)
GLUCOSE SERPL-MCNC: 95 MG/DL (ref 74–106)
GLUCOSE UR QL STRIP: NEGATIVE
KETONES UR QL STRIP: NEGATIVE
LEUKOCYTE ESTERASE URINE, POC: NORMAL
NITRITE, POC UA: NEGATIVE
PH, POC UA: 5
POTASSIUM SERPL-SCNC: 5 MMOL/L (ref 3.5–5.1)
PROTEIN, POC: 30
RBC #/AREA URNS HPF: ABNORMAL /HPF
SODIUM SERPL-SCNC: 135 MMOL/L (ref 136–145)
SPECIFIC GRAVITY, POC UA: 1.02
SQUAMOUS #/AREA URNS LPF: ABNORMAL /LPF
TSH SERPL DL<=0.005 MIU/L-ACNC: 0.86 UIU/ML (ref 0.36–3.74)
UROBILINOGEN, POC UA: 0.2
WBC #/AREA URNS HPF: ABNORMAL /HPF

## 2022-05-09 PROCEDURE — 87077 CULTURE AEROBIC IDENTIFY: CPT | Mod: ,,, | Performed by: CLINICAL MEDICAL LABORATORY

## 2022-05-09 PROCEDURE — 87077 CULTURE, URINE: ICD-10-PCS | Mod: ,,, | Performed by: CLINICAL MEDICAL LABORATORY

## 2022-05-09 PROCEDURE — 81001 URINALYSIS, MICROSCOPIC: ICD-10-PCS | Mod: ,,, | Performed by: CLINICAL MEDICAL LABORATORY

## 2022-05-09 PROCEDURE — 99214 OFFICE O/P EST MOD 30 MIN: CPT | Mod: ,,, | Performed by: FAMILY MEDICINE

## 2022-05-09 PROCEDURE — 87086 CULTURE, URINE: ICD-10-PCS | Mod: ,,, | Performed by: CLINICAL MEDICAL LABORATORY

## 2022-05-09 PROCEDURE — 84443 TSH: ICD-10-PCS | Mod: ,,, | Performed by: CLINICAL MEDICAL LABORATORY

## 2022-05-09 PROCEDURE — 84443 ASSAY THYROID STIM HORMONE: CPT | Mod: ,,, | Performed by: CLINICAL MEDICAL LABORATORY

## 2022-05-09 PROCEDURE — 87186 CULTURE, URINE: ICD-10-PCS | Mod: ,,, | Performed by: CLINICAL MEDICAL LABORATORY

## 2022-05-09 PROCEDURE — 87186 SC STD MICRODIL/AGAR DIL: CPT | Mod: ,,, | Performed by: CLINICAL MEDICAL LABORATORY

## 2022-05-09 PROCEDURE — 81001 URINALYSIS AUTO W/SCOPE: CPT | Mod: ,,, | Performed by: CLINICAL MEDICAL LABORATORY

## 2022-05-09 PROCEDURE — 81003 URINALYSIS AUTO W/O SCOPE: CPT | Mod: RHCUB | Performed by: FAMILY MEDICINE

## 2022-05-09 PROCEDURE — 80048 BASIC METABOLIC PNL TOTAL CA: CPT | Mod: ,,, | Performed by: CLINICAL MEDICAL LABORATORY

## 2022-05-09 PROCEDURE — 99214 PR OFFICE/OUTPT VISIT, EST, LEVL IV, 30-39 MIN: ICD-10-PCS | Mod: ,,, | Performed by: FAMILY MEDICINE

## 2022-05-09 PROCEDURE — 87086 URINE CULTURE/COLONY COUNT: CPT | Mod: ,,, | Performed by: CLINICAL MEDICAL LABORATORY

## 2022-05-09 PROCEDURE — 80048 BASIC METABOLIC PANEL: ICD-10-PCS | Mod: ,,, | Performed by: CLINICAL MEDICAL LABORATORY

## 2022-05-09 RX ORDER — POTASSIUM CHLORIDE 750 MG/1
20 CAPSULE, EXTENDED RELEASE ORAL 2 TIMES DAILY
Qty: 360 CAPSULE | Refills: 1 | Status: SHIPPED | OUTPATIENT
Start: 2022-05-09 | End: 2022-11-30 | Stop reason: SDUPTHER

## 2022-05-09 RX ORDER — CARBIDOPA AND LEVODOPA 25; 100 MG/1; MG/1
2 TABLET ORAL 4 TIMES DAILY
COMMUNITY
Start: 2022-05-03 | End: 2023-08-24 | Stop reason: SDUPTHER

## 2022-05-09 RX ORDER — ONDANSETRON 4 MG/1
4 TABLET, FILM COATED ORAL EVERY 12 HOURS
COMMUNITY

## 2022-05-09 RX ORDER — PIMAVANSERIN TARTRATE 34 MG/1
1 CAPSULE ORAL DAILY
COMMUNITY
Start: 2022-05-03

## 2022-05-09 NOTE — PROGRESS NOTES
Clinic Note    Patient Name: Rosa Alejo  : 1940  MRN: [id number]    HPI:    Chief Complaint   Patient presents with    Follow-up     3 month     Medication Refill    Neck Pain       Ms. Rosa Alejo is a 81 y.o. female who present to clinic today with CC of follow up on chronic disease processes including dementia, depression, HTN and orthostatic hypotension, Parkinson's Disease, hypothyroidism, and OAB.  Patient reports she recently established care with Dr. Pepper (Neurology). Reports she did change some of her medications. She is scheduled to follow up with her in one month.   Reports she has seen ENT at [facility] regarding a stone in salivary gland. Reports she was referred to [facility] for this issue. Patient reports she never heard from her appt. However, review of records indicates patient was notified of an appt scheduled for 22 and letter was mailed as an appt reminder. Reports she would like to get this appt rescheduled.  Patient also reports she plans to follow up with her dentist (Dr. Ceballos).   Patient reports she is concerned she may have a UTI. She does have a h/o OAB. [facility]  She has previously followed with Neurology at [facility] but reports she is hopeful Dr. Pepper can handle these issues in [place] without her having to go back to [place].  Patient is concerned she may have a UTI. This is a chronic, intermittent issue for her. She has previously followed with Dr. Murphy. She has previously been on macrobid daily but has not been on this regimen for some time. Reports that she would like to repeat her urine before making a decision about resuming chronic antibiotics.   Patient is, otherwise, without complaints.     Medications:  Current Outpatient Medications on File Prior to Visit   Medication Sig Dispense Refill    calcium carbonate-vitamin D3 (CALTRATE 600 PLUS D) 600 mg (1,500 mg)-800 unit Chew       carbidopa-levodopa  mg (SINEMET)  mg  per tablet Take 1.5 tablets by mouth 4 (four) times daily.      cetirizine (ZYRTEC) 10 MG tablet Take 1 tablet (10 mg total) by mouth once daily. 90 tablet 1    cholecalciferol, vitamin D3, (VITAMIN D3) 50 mcg (2,000 unit) Cap       donepeziL (ARICEPT) 10 MG tablet       ibuprofen (ADVIL,MOTRIN) 800 MG tablet Take 0.5 tablets (400 mg total) by mouth every 6 (six) hours as needed for Pain. 30 tablet 1    levothyroxine (SYNTHROID) 88 MCG tablet Take 1 tablet (88 mcg total) by mouth before breakfast. 90 tablet 1    linaCLOtide (LINZESS) 145 mcg Cap capsule Take 1 capsule by mouth.      montelukast (SINGULAIR) 10 mg tablet Take by mouth.      pantoprazole (PROTONIX) 40 MG tablet       pimavanserin (NUPLAZID) 34 mg Cap Take 1 capsule by mouth once daily.      potassium chloride (MICRO-K) 10 MEQ CpSR Take 1 capsule (10 mEq total) by mouth 2 (two) times daily. Take 2 tablets twice a day 360 capsule 1    pyridostigmine (MESTINON) 60 mg Tab 30 mg 2 (two) times a day.       traMADoL (ULTRAM) 50 mg tablet       venlafaxine (EFFEXOR-XR) 75 MG 24 hr capsule Take 75 mg by mouth once daily.       [DISCONTINUED] carbidopa-levodopa  mg (SINEMET)  mg per tablet Take by mouth.      [DISCONTINUED] ondansetron (ZOFRAN-ODT) 4 MG TbDL Take 1 tablet (4 mg total) by mouth every 12 (twelve) hours as needed (chronic nausea). 60 tablet 1    ondansetron (ZOFRAN) 4 MG tablet Take 1 tablet by mouth daily as needed for Nausea.       No current facility-administered medications on file prior to visit.         Allergies: Sulfa (sulfonamide antibiotics)      Past Medical History:    Past Medical History:   Diagnosis Date    Dementia     Depression     Hypertension     Hypotension     Hypothyroid     OAB (overactive bladder)     chronic    Parkinson's disease        Past Surgical History:    Past Surgical History:   Procedure Laterality Date    CHOLECYSTECTOMY      ESOPHAGEAL DILATION      Interstim electrode test  insertion with x-ray imaging   08/10/2020    at [facility];  test implant at S3 without difficulty;  2.0 mA was the testing and point;  no complications    KNEE SURGERY      LUMPECTOMY,BREAST, WITH RADIOACTIVE SEED LOCALIZATION AND SENTINEL LYMPH NODE BIOPSY      REMOVAL OF SACRAL NEUROSTIMULATOR DEVICE Left 11/22/2021    Procedure: REMOVAL, NEUROSTIMULATOR, SACRAL;  Surgeon: Reza Murphy MD;  Location: [facility] OR;  Service: Pain Management;  Laterality: Left;    SINUS SURGERY      TOTAL ABDOMINAL HYSTERECTOMY W/ BILATERAL SALPINGOOPHORECTOMY           Social History:    Social History     Tobacco Use   Smoking Status Never Smoker   Smokeless Tobacco Never Used     Social History     Substance and Sexual Activity   Alcohol Use Not Currently     Social History     Substance and Sexual Activity   Drug Use Never         Family History:    Family History   Problem Relation Age of Onset    Heart disease Father     Heart disease Mother        Review of Systems:    Review of Systems   Constitutional: Positive for fatigue. Negative for appetite change, chills, fever and unexpected weight change.   Eyes: Positive for visual disturbance.   Respiratory: Negative for cough and shortness of breath.    Cardiovascular: Negative for chest pain and leg swelling.        Reports occasional swelling in ankles   Gastrointestinal: Negative for abdominal pain, change in bowel habit, constipation, diarrhea, nausea, vomiting and change in bowel habit.        Reports intermittent issues with GERD  Reports chronic, intermittent issues with abdominal cramps, diarrhea/nausea- reports she follows with GI (Dr. Felix at [facility])   Genitourinary: Positive for dysuria and frequency.   Musculoskeletal: Negative for arthralgias.   Integumentary:  Negative for rash.   Neurological: Positive for tremors and headaches. Negative for dizziness.   Psychiatric/Behavioral: The patient is not nervous/anxious.      "    Vitals:    BP 98/62 (BP Location: Left arm, Patient Position: Sitting, BP Method: Small (Manual))   Pulse 88   Temp 97.1 °F (36.2 °C) (Oral)   Resp 18   Ht 5' 7" (1.702 m)   Wt 44 kg (97 lb)   SpO2 99%   BMI 15.19 kg/m²        Physical Exam:    Physical Exam  Constitutional:       General: She is not in acute distress.     Comments: Appears chronically ill but stable and in no acute distress   HENT:      Nose: Nose normal.      Mouth/Throat:      Mouth: Mucous membranes are moist.      Pharynx: Oropharynx is clear.   Eyes:      Conjunctiva/sclera: Conjunctivae normal.   Cardiovascular:      Rate and Rhythm: Normal rate and regular rhythm.      Heart sounds: Normal heart sounds. No murmur heard.  Pulmonary:      Effort: Pulmonary effort is normal. No respiratory distress.      Breath sounds: Normal breath sounds. No wheezing, rhonchi or rales.   Abdominal:      General: Bowel sounds are normal.      Palpations: Abdomen is soft.      Tenderness: There is no abdominal tenderness. There is no right CVA tenderness, left CVA tenderness, guarding or rebound.   Musculoskeletal:      Cervical back: Neck supple.   Skin:     Findings: No rash.   Neurological:      General: No focal deficit present.      Mental Status: She is alert. Mental status is at baseline.      Comments: In wheelchair   Psychiatric:         Mood and Affect: Mood normal.         Assessment/Plan:   Hypokalemia  -     potassium chloride (MICRO-K) 10 MEQ CpSR; Take 2 capsules (20 mEq total) by mouth 2 (two) times daily. Take 2 tablets twice a day  Dispense: 360 capsule; Refill: 1  -     Basic Metabolic Panel; Future; Expected date: 05/09/2022    Other specified hypothyroidism  -     TSH; Future; Expected date: 05/09/2022    Urinary frequency  -     POCT URINALYSIS W/O SCOPE  -     Urinalysis, Microscopic; Future; Expected date: 05/09/2022  -     Urine culture; Future; Expected date: 05/09/2022  -     Cancel: Urine culture         RTC in 3 months " for follow up on chronic disease processes.  RTC sooner if needed.   Patient voiced understanding and is agreeable to plan.      Rosa Miller MD    Family Medicine

## 2022-05-11 ENCOUNTER — TELEPHONE (OUTPATIENT)
Dept: FAMILY MEDICINE | Facility: CLINIC | Age: 82
End: 2022-05-11
Payer: MEDICARE

## 2022-05-11 LAB — UA COMPLETE W REFLEX CULTURE PNL UR: ABNORMAL

## 2022-05-11 RX ORDER — NITROFURANTOIN 25; 75 MG/1; MG/1
100 CAPSULE ORAL 2 TIMES DAILY
Qty: 10 CAPSULE | Refills: 0 | Status: SHIPPED | OUTPATIENT
Start: 2022-05-11 | End: 2022-05-16

## 2022-05-11 RX ORDER — NITROFURANTOIN 25; 75 MG/1; MG/1
100 CAPSULE ORAL 2 TIMES DAILY
COMMUNITY
Start: 2022-05-11 | End: 2022-05-11 | Stop reason: SDUPTHER

## 2022-05-12 RX ORDER — NITROFURANTOIN 25; 75 MG/1; MG/1
100 CAPSULE ORAL DAILY
COMMUNITY
Start: 2022-05-12 | End: 2022-05-12 | Stop reason: SDUPTHER

## 2022-05-12 RX ORDER — NITROFURANTOIN 25; 75 MG/1; MG/1
100 CAPSULE ORAL DAILY
Qty: 30 CAPSULE | Refills: 1 | Status: SHIPPED | OUTPATIENT
Start: 2022-05-12 | End: 2022-06-11

## 2022-07-12 ENCOUNTER — TELEPHONE (OUTPATIENT)
Dept: FAMILY MEDICINE | Facility: CLINIC | Age: 82
End: 2022-07-12
Payer: MEDICARE

## 2022-07-12 NOTE — TELEPHONE ENCOUNTER
Patient saw Dr. Hsu eye specialist in [place] for blurred vision. And Was told nothing more could be done for blurred vision, and patient wants to know if she could be set up to see another eye specialist.  Also home health states she took some antibiotics for uti , now has thrush in mouth with very red tongue, and needs something for that., and home health wanted to know if she could have speech for difficulty swallowing. All information from Shanel [phone]

## 2022-07-14 ENCOUNTER — TELEPHONE (OUTPATIENT)
Dept: FAMILY MEDICINE | Facility: CLINIC | Age: 82
End: 2022-07-14
Payer: MEDICARE

## 2022-07-14 DIAGNOSIS — B37.0 THRUSH: Primary | ICD-10-CM

## 2022-07-14 DIAGNOSIS — H53.8 BLURRED VISION: ICD-10-CM

## 2022-07-14 RX ORDER — NYSTATIN 100000 [USP'U]/ML
SUSPENSION ORAL
Qty: 473 ML | Refills: 0 | Status: SHIPPED | OUTPATIENT
Start: 2022-07-14 | End: 2023-02-03

## 2022-07-14 NOTE — TELEPHONE ENCOUNTER
Patient saw Dr. Hsu eye specialist in [place] for blurred vision. And Was told nothing more could be done for blurred vision, and patient wants to know if she could be set up to see another eye specialist.  Also home health states she took some antibiotics for uti , now has thrush in mouth with very red tongue, and needs something for that., and home health wanted to know if she could have speech for difficulty swallowing. All information from Shanel [phone]    Called and spoke with Shanel at [facility]. Referral placed to Dr. Ng for second opinion on vision. Nystatin swish and spit prescribed for thrush. Ok to add speech. Voiced understanding and advised she would also inform patient.

## 2022-07-18 NOTE — TELEPHONE ENCOUNTER
Health Maintenance Due   Topic     COVID-19 Vaccine (1)     TETANUS VACCINE      Shingles Vaccine (1 of 2)  hx chicken pox. Notified pt can get vaccine at pharmacy    Pneumococcal Vaccines (Age 0-64) (2 - PCV)           Neurology referral placed at last office visit. Appt scheduled for 05/03/2022 at 11:15a.m. with Dr. Riya Smith's Neurology. Pt notified of appt.

## 2022-08-10 RX ORDER — LEVOTHYROXINE SODIUM 88 UG/1
88 TABLET ORAL
Qty: 90 TABLET | Refills: 1 | Status: SHIPPED | OUTPATIENT
Start: 2022-08-10 | End: 2023-03-22 | Stop reason: SDUPTHER

## 2022-08-10 NOTE — TELEPHONE ENCOUNTER
----- Message from Roxana Miller MD sent at 8/10/2022  2:31 PM CDT -----  Regarding: FW: med refilled  Please place this medication in my cart for me to sign. Thanks!  ----- Message -----  From: Valentín Carrasco  Sent: 8/10/2022   1:59 PM CDT  To: Ericka Lucas Staff  Subject: med refilled                                     Pt need this med levothyroxine (SYNTHROID to be refilled and send to [facility]

## 2022-08-20 ENCOUNTER — HOSPITAL ENCOUNTER (EMERGENCY)
Facility: HOSPITAL | Age: 82
Discharge: OTHER INSTITUTION | End: 2022-08-20
Payer: MEDICARE

## 2022-08-20 VITALS
BODY MASS INDEX: 13.94 KG/M2 | SYSTOLIC BLOOD PRESSURE: 123 MMHG | RESPIRATION RATE: 18 BRPM | DIASTOLIC BLOOD PRESSURE: 77 MMHG | WEIGHT: 89 LBS | TEMPERATURE: 98 F | HEART RATE: 89 BPM | OXYGEN SATURATION: 98 %

## 2022-08-20 DIAGNOSIS — S72.102A CLOSED FRACTURE OF TROCHANTER OF LEFT FEMUR, INITIAL ENCOUNTER: Primary | ICD-10-CM

## 2022-08-20 LAB
ANION GAP SERPL CALCULATED.3IONS-SCNC: 14 MMOL/L (ref 7–16)
APTT PPP: 25 SECONDS (ref 25.2–37.3)
BASOPHILS # BLD AUTO: 0.02 K/UL (ref 0–0.2)
BASOPHILS NFR BLD AUTO: 0.2 % (ref 0–1)
BILIRUB UR QL STRIP: NEGATIVE
BUN SERPL-MCNC: 31 MG/DL (ref 7–18)
BUN/CREAT SERPL: 30 (ref 6–20)
CALCIUM SERPL-MCNC: 8.7 MG/DL (ref 8.5–10.1)
CHLORIDE SERPL-SCNC: 103 MMOL/L (ref 98–107)
CLARITY UR: CLEAR
CO2 SERPL-SCNC: 26 MMOL/L (ref 21–32)
COLOR UR: YELLOW
CREAT SERPL-MCNC: 1.05 MG/DL (ref 0.55–1.02)
DIFFERENTIAL METHOD BLD: ABNORMAL
EGFR (NO RACE VARIABLE) (RUSH/TITUS): 53 ML/MIN/1.73M²
EOSINOPHIL # BLD AUTO: 0.01 K/UL (ref 0–0.5)
EOSINOPHIL NFR BLD AUTO: 0.1 % (ref 1–4)
ERYTHROCYTE [DISTWIDTH] IN BLOOD BY AUTOMATED COUNT: 12.9 % (ref 11.5–14.5)
GLUCOSE SERPL-MCNC: 173 MG/DL (ref 74–106)
GLUCOSE UR STRIP-MCNC: NEGATIVE MG/DL
HCT VFR BLD AUTO: 34.3 % (ref 38–47)
HGB BLD-MCNC: 11.8 G/DL (ref 12–16)
INR BLD: 0.95
KETONES UR STRIP-SCNC: NORMAL MG/DL
LEUKOCYTE ESTERASE UR QL STRIP: NEGATIVE
LYMPHOCYTES # BLD AUTO: 0.59 K/UL (ref 1–4.8)
LYMPHOCYTES NFR BLD AUTO: 5.5 % (ref 27–41)
LYMPHOCYTES NFR BLD MANUAL: 7 % (ref 27–41)
MCH RBC QN AUTO: 33.2 PG (ref 27–31)
MCHC RBC AUTO-ENTMCNC: 34.4 G/DL (ref 32–36)
MCV RBC AUTO: 96.6 FL (ref 80–96)
MONOCYTES # BLD AUTO: 0.55 K/UL (ref 0–0.8)
MONOCYTES NFR BLD AUTO: 5.2 % (ref 2–6)
MONOCYTES NFR BLD MANUAL: 8 % (ref 2–6)
MPC BLD CALC-MCNC: 9 FL (ref 9.4–12.4)
NEUTROPHILS # BLD AUTO: 9.49 K/UL (ref 1.8–7.7)
NEUTROPHILS NFR BLD AUTO: 89 % (ref 53–65)
NEUTS BAND NFR BLD MANUAL: 2 % (ref 1–5)
NEUTS SEG NFR BLD MANUAL: 83 % (ref 50–62)
NITRITE UR QL STRIP: NEGATIVE
NRBC BLD MANUAL-RTO: ABNORMAL %
PH UR STRIP: 5 PH UNITS
PLATELET # BLD AUTO: 290 K/UL (ref 150–400)
PLATELET MORPHOLOGY: NORMAL
POTASSIUM SERPL-SCNC: 4.6 MMOL/L (ref 3.5–5.1)
PROT UR QL STRIP: NEGATIVE
PROTHROMBIN TIME: 13.1 SECONDS (ref 11.7–14.7)
RBC # BLD AUTO: 3.55 M/UL (ref 4.2–5.4)
RBC # UR STRIP: NEGATIVE /UL
RBC MORPH BLD: NORMAL
SARS-COV+SARS-COV-2 AG RESP QL IA.RAPID: NEGATIVE
SODIUM SERPL-SCNC: 138 MMOL/L (ref 136–145)
SP GR UR STRIP: 1.01
UROBILINOGEN UR STRIP-ACNC: 0.2 MG/DL
WBC # BLD AUTO: 10.66 K/UL (ref 4.5–11)

## 2022-08-20 PROCEDURE — 63600175 PHARM REV CODE 636 W HCPCS: Performed by: NURSE PRACTITIONER

## 2022-08-20 PROCEDURE — 96376 TX/PRO/DX INJ SAME DRUG ADON: CPT

## 2022-08-20 PROCEDURE — 36415 COLL VENOUS BLD VENIPUNCTURE: CPT | Performed by: NURSE PRACTITIONER

## 2022-08-20 PROCEDURE — 96374 THER/PROPH/DIAG INJ IV PUSH: CPT

## 2022-08-20 PROCEDURE — 99285 EMERGENCY DEPT VISIT HI MDM: CPT | Performed by: NURSE PRACTITIONER

## 2022-08-20 PROCEDURE — 99285 EMERGENCY DEPT VISIT HI MDM: CPT | Mod: 25,CS

## 2022-08-20 PROCEDURE — 87426 SARSCOV CORONAVIRUS AG IA: CPT | Performed by: NURSE PRACTITIONER

## 2022-08-20 PROCEDURE — 85025 COMPLETE CBC W/AUTO DIFF WBC: CPT | Performed by: NURSE PRACTITIONER

## 2022-08-20 PROCEDURE — 85730 THROMBOPLASTIN TIME PARTIAL: CPT | Performed by: NURSE PRACTITIONER

## 2022-08-20 PROCEDURE — 96375 TX/PRO/DX INJ NEW DRUG ADDON: CPT

## 2022-08-20 PROCEDURE — 80048 BASIC METABOLIC PNL TOTAL CA: CPT | Performed by: NURSE PRACTITIONER

## 2022-08-20 PROCEDURE — 81003 URINALYSIS AUTO W/O SCOPE: CPT | Performed by: NURSE PRACTITIONER

## 2022-08-20 PROCEDURE — 85610 PROTHROMBIN TIME: CPT | Performed by: NURSE PRACTITIONER

## 2022-08-20 RX ORDER — QUETIAPINE FUMARATE 25 MG/1
12.5 TABLET, FILM COATED ORAL NIGHTLY
COMMUNITY
End: 2023-04-19 | Stop reason: SDUPTHER

## 2022-08-20 RX ORDER — ONDANSETRON 2 MG/ML
4 INJECTION INTRAMUSCULAR; INTRAVENOUS
Status: COMPLETED | OUTPATIENT
Start: 2022-08-20 | End: 2022-08-20

## 2022-08-20 RX ORDER — MORPHINE SULFATE 2 MG/ML
2 INJECTION, SOLUTION INTRAMUSCULAR; INTRAVENOUS
Status: COMPLETED | OUTPATIENT
Start: 2022-08-20 | End: 2022-08-20

## 2022-08-20 RX ORDER — MORPHINE SULFATE 4 MG/ML
4 INJECTION, SOLUTION INTRAMUSCULAR; INTRAVENOUS
Status: DISCONTINUED | OUTPATIENT
Start: 2022-08-20 | End: 2022-08-20

## 2022-08-20 RX ADMIN — MORPHINE SULFATE 2 MG: 2 INJECTION, SOLUTION INTRAMUSCULAR; INTRAVENOUS at 02:08

## 2022-08-20 RX ADMIN — MORPHINE SULFATE 2 MG: 2 INJECTION, SOLUTION INTRAMUSCULAR; INTRAVENOUS at 01:08

## 2022-08-20 RX ADMIN — ONDANSETRON 4 MG: 2 INJECTION INTRAMUSCULAR; INTRAVENOUS at 01:08

## 2022-08-20 NOTE — ED TRIAGE NOTES
Fell on hard alyson, had gotten up and was confused somewhat but this is pts norm with her dementia and parkinsons.  Fell about 0605 this am

## 2022-08-21 ENCOUNTER — OUTSIDE PLACE OF SERVICE (OUTPATIENT)
Dept: ORTHOPEDICS | Facility: CLINIC | Age: 82
End: 2022-08-21
Payer: MEDICARE

## 2022-08-21 PROCEDURE — 27245 PR OPEN FIX INTER/SUBTROCH FX,IMPLNT: ICD-10-PCS | Mod: LT,,, | Performed by: ORTHOPAEDIC SURGERY

## 2022-08-21 PROCEDURE — 27245 TREAT THIGH FRACTURE: CPT | Mod: LT,,, | Performed by: ORTHOPAEDIC SURGERY

## 2022-08-22 DIAGNOSIS — R13.10 DYSPHAGIA, UNSPECIFIED TYPE: Primary | ICD-10-CM

## 2022-08-23 ENCOUNTER — HOSPITAL ENCOUNTER (INPATIENT)
Facility: HOSPITAL | Age: 82
LOS: 24 days | Discharge: HOME | DRG: 560 | End: 2022-09-16
Attending: FAMILY MEDICINE | Admitting: FAMILY MEDICINE
Payer: MEDICARE

## 2022-08-23 DIAGNOSIS — S72.009A HIP FRACTURE REQUIRING OPERATIVE REPAIR: ICD-10-CM

## 2022-08-23 DIAGNOSIS — S72.001A CLOSED FRACTURE OF RIGHT HIP REQUIRING OPERATIVE REPAIR, INITIAL ENCOUNTER: ICD-10-CM

## 2022-08-23 PROCEDURE — 11000004 HC SNF PRIVATE

## 2022-08-23 PROCEDURE — 27000941

## 2022-08-23 PROCEDURE — 25000003 PHARM REV CODE 250: Performed by: NURSE PRACTITIONER

## 2022-08-23 PROCEDURE — 94761 N-INVAS EAR/PLS OXIMETRY MLT: CPT

## 2022-08-23 PROCEDURE — 25000003 PHARM REV CODE 250: Performed by: FAMILY MEDICINE

## 2022-08-23 RX ORDER — CETIRIZINE HYDROCHLORIDE 10 MG/1
10 TABLET ORAL DAILY
Status: DISCONTINUED | OUTPATIENT
Start: 2022-08-24 | End: 2022-09-16 | Stop reason: HOSPADM

## 2022-08-23 RX ORDER — PYRIDOSTIGMINE BROMIDE 60 MG/1
60 TABLET ORAL 2 TIMES DAILY
Status: DISCONTINUED | OUTPATIENT
Start: 2022-08-23 | End: 2022-08-24 | Stop reason: DRUGHIGH

## 2022-08-23 RX ORDER — ASPIRIN 325 MG
325 TABLET ORAL DAILY
COMMUNITY

## 2022-08-23 RX ORDER — POTASSIUM CHLORIDE 750 MG/1
20 CAPSULE, EXTENDED RELEASE ORAL 2 TIMES DAILY
Status: DISCONTINUED | OUTPATIENT
Start: 2022-08-23 | End: 2022-08-23

## 2022-08-23 RX ORDER — LEVOTHYROXINE SODIUM 88 UG/1
44 TABLET ORAL
COMMUNITY
End: 2023-04-19

## 2022-08-23 RX ORDER — PANTOPRAZOLE SODIUM 40 MG/1
40 TABLET, DELAYED RELEASE ORAL DAILY
Status: DISCONTINUED | OUTPATIENT
Start: 2022-08-24 | End: 2022-08-24 | Stop reason: DRUGHIGH

## 2022-08-23 RX ORDER — CARBIDOPA AND LEVODOPA 25; 100 MG/1; MG/1
2 TABLET ORAL 4 TIMES DAILY
Status: DISCONTINUED | OUTPATIENT
Start: 2022-08-23 | End: 2022-08-24 | Stop reason: DRUGHIGH

## 2022-08-23 RX ORDER — OXYCODONE AND ACETAMINOPHEN 5; 325 MG/1; MG/1
1 TABLET ORAL EVERY 4 HOURS PRN
Status: DISCONTINUED | OUTPATIENT
Start: 2022-08-23 | End: 2022-08-24

## 2022-08-23 RX ORDER — ASPIRIN 325 MG
325 TABLET ORAL DAILY
Status: DISCONTINUED | OUTPATIENT
Start: 2022-08-24 | End: 2022-09-16 | Stop reason: HOSPADM

## 2022-08-23 RX ORDER — TRAMADOL HYDROCHLORIDE 50 MG/1
50 TABLET ORAL EVERY 4 HOURS PRN
Status: DISCONTINUED | OUTPATIENT
Start: 2022-08-23 | End: 2022-08-23

## 2022-08-23 RX ORDER — MONTELUKAST SODIUM 10 MG/1
10 TABLET ORAL DAILY
Status: DISCONTINUED | OUTPATIENT
Start: 2022-08-24 | End: 2022-08-23

## 2022-08-23 RX ORDER — FERROUS SULFATE, DRIED 160(50) MG
1 TABLET, EXTENDED RELEASE ORAL DAILY
Status: DISCONTINUED | OUTPATIENT
Start: 2022-08-24 | End: 2022-09-16 | Stop reason: HOSPADM

## 2022-08-23 RX ORDER — POLYETHYLENE GLYCOL 3350 17 G/17G
17 POWDER, FOR SOLUTION ORAL DAILY PRN
Status: DISCONTINUED | OUTPATIENT
Start: 2022-08-23 | End: 2022-09-16 | Stop reason: HOSPADM

## 2022-08-23 RX ORDER — IBUPROFEN 200 MG
400 TABLET ORAL EVERY 6 HOURS PRN
Status: DISCONTINUED | OUTPATIENT
Start: 2022-08-23 | End: 2022-08-24

## 2022-08-23 RX ORDER — NYSTATIN 100000 [USP'U]/ML
500000 SUSPENSION ORAL DAILY
Status: DISCONTINUED | OUTPATIENT
Start: 2022-08-24 | End: 2022-08-24 | Stop reason: DRUGHIGH

## 2022-08-23 RX ORDER — TALC
6 POWDER (GRAM) TOPICAL NIGHTLY PRN
Status: DISCONTINUED | OUTPATIENT
Start: 2022-08-23 | End: 2022-09-16 | Stop reason: HOSPADM

## 2022-08-23 RX ORDER — VENLAFAXINE HYDROCHLORIDE 75 MG/1
75 CAPSULE, EXTENDED RELEASE ORAL DAILY
Status: DISCONTINUED | OUTPATIENT
Start: 2022-08-24 | End: 2022-09-16 | Stop reason: HOSPADM

## 2022-08-23 RX ORDER — OXYCODONE AND ACETAMINOPHEN 5; 325 MG/1; MG/1
1 TABLET ORAL EVERY 4 HOURS PRN
COMMUNITY
End: 2023-10-03

## 2022-08-23 RX ORDER — FERROUS SULFATE, DRIED 160(50) MG
1 TABLET, EXTENDED RELEASE ORAL DAILY
Status: DISCONTINUED | OUTPATIENT
Start: 2022-08-24 | End: 2022-08-23

## 2022-08-23 RX ORDER — DONEPEZIL HYDROCHLORIDE 5 MG/1
10 TABLET, FILM COATED ORAL DAILY
Status: DISCONTINUED | OUTPATIENT
Start: 2022-08-24 | End: 2022-09-16 | Stop reason: HOSPADM

## 2022-08-23 RX ORDER — LEVOTHYROXINE SODIUM 88 UG/1
88 TABLET ORAL
Status: DISCONTINUED | OUTPATIENT
Start: 2022-08-24 | End: 2022-09-16 | Stop reason: HOSPADM

## 2022-08-23 RX ORDER — LEVOTHYROXINE SODIUM 50 UG/1
50 TABLET ORAL
Status: DISCONTINUED | OUTPATIENT
Start: 2022-08-27 | End: 2022-09-16 | Stop reason: HOSPADM

## 2022-08-23 RX ORDER — CHOLECALCIFEROL (VITAMIN D3) 25 MCG
2000 TABLET ORAL DAILY
Status: DISCONTINUED | OUTPATIENT
Start: 2022-08-24 | End: 2022-09-16 | Stop reason: HOSPADM

## 2022-08-23 RX ORDER — QUETIAPINE FUMARATE 25 MG/1
25 TABLET, FILM COATED ORAL NIGHTLY
Status: DISCONTINUED | OUTPATIENT
Start: 2022-08-23 | End: 2022-08-24 | Stop reason: DRUGHIGH

## 2022-08-23 RX ORDER — DOCUSATE SODIUM 100 MG/1
100 CAPSULE, LIQUID FILLED ORAL DAILY
Status: DISCONTINUED | OUTPATIENT
Start: 2022-08-24 | End: 2022-09-16 | Stop reason: HOSPADM

## 2022-08-23 RX ORDER — ONDANSETRON 4 MG/1
4 TABLET, ORALLY DISINTEGRATING ORAL EVERY 6 HOURS PRN
Status: DISCONTINUED | OUTPATIENT
Start: 2022-08-23 | End: 2022-09-16 | Stop reason: HOSPADM

## 2022-08-23 RX ORDER — MUPIROCIN 20 MG/G
OINTMENT TOPICAL 2 TIMES DAILY
Status: DISPENSED | OUTPATIENT
Start: 2022-08-23 | End: 2022-08-28

## 2022-08-23 RX ORDER — POTASSIUM CHLORIDE 750 MG/1
20 TABLET, EXTENDED RELEASE ORAL 2 TIMES DAILY
Status: DISCONTINUED | OUTPATIENT
Start: 2022-08-29 | End: 2022-08-24 | Stop reason: ALTCHOICE

## 2022-08-23 RX ORDER — POTASSIUM CHLORIDE 750 MG/1
20 TABLET, EXTENDED RELEASE ORAL 2 TIMES DAILY
Status: DISCONTINUED | OUTPATIENT
Start: 2022-08-23 | End: 2022-08-23

## 2022-08-23 RX ADMIN — CARBIDOPA AND LEVODOPA 2 TABLET: 25; 100 TABLET ORAL at 08:08

## 2022-08-23 RX ADMIN — Medication 6 MG: at 08:08

## 2022-08-23 RX ADMIN — MUPIROCIN: 20 OINTMENT TOPICAL at 08:08

## 2022-08-23 NOTE — NURSING
"Family member came down hallway at this time. Stated to family member at this time that I noticed a clear patch on the right side of pt abdomen on assessment. Family member interrupted and stated "Before you go any further you need to get her some zofran because she is nauseated." Told family member that Dr. Skinner has been notified of med req being completed with several medications and that he also runs the ED. Family member stated, "Well, she has to have it now." Told family member unable to give med without doctor order. Family member stated, "Well what are we going to do then she hasn't even had her 4:00 meds. She needs her parkinson med." Told family member as soon as we had doctor orders to give meds then we could admin. Meds. Family member walked back to patient room and mumbled "Well, that isn't going to work."        "

## 2022-08-23 NOTE — NURSING
Spoke with Natalia again (nurse @ [facility] 3rd floor) via telephone who gave nurse report at 1330. Asked Natalia when IV was placed. Natalia stated IV was a 20g placed on 8/20/22 and it was the same IV from when pt transferred from [facility]. Told Natalia IV was listed in paperwork Right FA but was located more at the right wrist. Natalia stated she noticed that too. Told Natalia was notifying her to make sure IV was placed on 8/20/22 or if it was a new IV.

## 2022-08-23 NOTE — NURSING
Blue/purple bruise noted to Left side of forehead on admission.    Clear patch noted on Right side of abdomen noted

## 2022-08-23 NOTE — NURSING
Notified Lavonne Berger NP that some medication orders entered by Dr. Skinner did not match [facility] discharge med list. Reviewed [facility] discharge med list with Lavonne Berger NP. ARLEY Banerjee made a copy of med list to review and make changes as needed.

## 2022-08-23 NOTE — NURSING
Dr. Skinner reminded about med req being completed via telephone and family member being demanding about pt needing 1600 meds and zofran.

## 2022-08-23 NOTE — NURSING
"Pt had emesis bag in hands from ambulance stretcher to being moved on bed. Asked pt on admission if she felt nauseated from the ambulance ride and asked if it was a rough ride. Pt stated, "No, it was actually a smooth ride."  "

## 2022-08-23 NOTE — PLAN OF CARE
Problem: Adult Inpatient Plan of Care  Goal: Plan of Care Review  Outcome: Ongoing, Progressing  Goal: Patient-Specific Goal (Individualized)  Outcome: Ongoing, Progressing  Goal: Absence of Hospital-Acquired Illness or Injury  Outcome: Ongoing, Progressing  Goal: Optimal Comfort and Wellbeing  Outcome: Ongoing, Progressing  Goal: Readiness for Transition of Care  Outcome: Ongoing, Progressing     Problem: Fall Injury Risk  Goal: Absence of Fall and Fall-Related Injury  Outcome: Ongoing, Progressing     Problem: Pain Acute  Goal: Acceptable Pain Control and Functional Ability  Outcome: Ongoing, Progressing     Problem: Skin Injury Risk Increased  Goal: Skin Health and Integrity  Outcome: Ongoing, Progressing     Problem: Adjustment to Injury (Hip Fracture Medical Management)  Goal: Optimal Coping with Change in Health Status  Outcome: Ongoing, Progressing     Problem: Bleeding (Hip Fracture Medical Management)  Goal: Absence of Bleeding  Outcome: Ongoing, Progressing     Problem: Bowel Elimination Impaired (Hip Fracture Medical Management)  Goal: Effective Bowel Elimination  Outcome: Ongoing, Progressing     Problem: Cognitive Decline Risk (Hip Fracture Medical Management)  Goal: Baseline Cognitive Function Maintained  Outcome: Ongoing, Progressing     Problem: Embolism (Hip Fracture Medical Management)  Goal: Absence of Embolism  Outcome: Ongoing, Progressing     Problem: Fracture Stabilization and Management (Hip Fracture Medical Management)  Goal: Fracture Stability  Outcome: Ongoing, Progressing     Problem: Functional Ability Impaired (Hip Fracture Medical Management)  Goal: Optimal Functional Performance  Outcome: Ongoing, Progressing     Problem: Pain (Hip Fracture Medical Management)  Goal: Acceptable Pain Level  Outcome: Ongoing, Progressing     Problem: Urinary Elimination Impaired (Hip Fracture Medical Management)  Goal: Effective Urinary Elimination  Outcome: Ongoing, Progressing

## 2022-08-23 NOTE — NURSING
Received pt at his time from [facility]. NADN. Lungs CTA. Bowel sounds heard x4 quad. Abdomen hard and distended. Pt stated it was uncomfortable when pressing on abdomen. Pt stated it was because she was constipated. Told pt nurse (Natalia) from [facility] stated she had a BM a few hours ago today. Pt stated I did. Weakness noted; [place] dressings noted to Lt hip; mild edema noted to bilateral lower extrem. Redness noted to Lt heel. Buttocks and back skin intact; no wounds noted to buttocks, back, and sacral. Pt confused on assessment. Pt dry noted. Pt has brief on noted. Joya from respiratory notified pt was here. Pt was 97% on room air. No family/visitors at bedside noted. V/S taken by SN Gurmeet.

## 2022-08-24 PROCEDURE — 25000003 PHARM REV CODE 250: Performed by: FAMILY MEDICINE

## 2022-08-24 PROCEDURE — 27000941

## 2022-08-24 PROCEDURE — 99499 NO LOS: ICD-10-PCS | Mod: ,,, | Performed by: FAMILY MEDICINE

## 2022-08-24 PROCEDURE — 25000003 PHARM REV CODE 250: Performed by: NURSE PRACTITIONER

## 2022-08-24 PROCEDURE — 11000004 HC SNF PRIVATE

## 2022-08-24 PROCEDURE — 97166 OT EVAL MOD COMPLEX 45 MIN: CPT

## 2022-08-24 PROCEDURE — 97161 PT EVAL LOW COMPLEX 20 MIN: CPT

## 2022-08-24 PROCEDURE — 99499 UNLISTED E&M SERVICE: CPT | Mod: ,,, | Performed by: FAMILY MEDICINE

## 2022-08-24 RX ORDER — POTASSIUM CHLORIDE 750 MG/1
20 TABLET, EXTENDED RELEASE ORAL 2 TIMES DAILY
Status: DISCONTINUED | OUTPATIENT
Start: 2022-08-29 | End: 2022-09-06

## 2022-08-24 RX ORDER — ACETAMINOPHEN 325 MG/1
650 TABLET ORAL EVERY 6 HOURS PRN
Status: DISCONTINUED | OUTPATIENT
Start: 2022-08-24 | End: 2022-09-16 | Stop reason: HOSPADM

## 2022-08-24 RX ORDER — TRAMADOL HYDROCHLORIDE 50 MG/1
50 TABLET ORAL EVERY 6 HOURS PRN
Status: DISCONTINUED | OUTPATIENT
Start: 2022-08-24 | End: 2022-09-16 | Stop reason: HOSPADM

## 2022-08-24 RX ORDER — PANTOPRAZOLE SODIUM 40 MG/1
40 TABLET, DELAYED RELEASE ORAL DAILY
Status: DISCONTINUED | OUTPATIENT
Start: 2022-08-24 | End: 2022-08-24 | Stop reason: DRUGHIGH

## 2022-08-24 RX ORDER — IBUPROFEN 200 MG
200 TABLET ORAL EVERY 6 HOURS PRN
Status: DISCONTINUED | OUTPATIENT
Start: 2022-08-24 | End: 2022-09-16 | Stop reason: HOSPADM

## 2022-08-24 RX ORDER — PANTOPRAZOLE SODIUM 20 MG/1
20 TABLET, DELAYED RELEASE ORAL DAILY
Status: DISCONTINUED | OUTPATIENT
Start: 2022-08-24 | End: 2022-09-16 | Stop reason: HOSPADM

## 2022-08-24 RX ORDER — CARBIDOPA AND LEVODOPA 25; 100 MG/1; MG/1
1 TABLET ORAL 4 TIMES DAILY
Status: DISCONTINUED | OUTPATIENT
Start: 2022-08-24 | End: 2022-08-25 | Stop reason: DRUGHIGH

## 2022-08-24 RX ORDER — ACETAMINOPHEN 325 MG/1
325 TABLET ORAL EVERY 6 HOURS PRN
Status: DISCONTINUED | OUTPATIENT
Start: 2022-08-24 | End: 2022-09-16 | Stop reason: HOSPADM

## 2022-08-24 RX ADMIN — Medication 1 TABLET: at 10:08

## 2022-08-24 RX ADMIN — DONEPEZIL HYDROCHLORIDE 10 MG: 5 TABLET, FILM COATED ORAL at 10:08

## 2022-08-24 RX ADMIN — CETIRIZINE HYDROCHLORIDE 10 MG: 10 TABLET, FILM COATED ORAL at 10:08

## 2022-08-24 RX ADMIN — CARBIDOPA AND LEVODOPA 1 SPLIT TABLET: 25; 100 TABLET ORAL at 04:08

## 2022-08-24 RX ADMIN — MUPIROCIN: 20 OINTMENT TOPICAL at 08:08

## 2022-08-24 RX ADMIN — LEVOTHYROXINE SODIUM 88 MCG: 0.09 TABLET ORAL at 06:08

## 2022-08-24 RX ADMIN — DOCUSATE SODIUM 100 MG: 100 CAPSULE, LIQUID FILLED ORAL at 10:08

## 2022-08-24 RX ADMIN — VENLAFAXINE HYDROCHLORIDE 75 MG: 75 CAPSULE, EXTENDED RELEASE ORAL at 10:08

## 2022-08-24 RX ADMIN — PANTOPRAZOLE SODIUM 20 MG: 20 TABLET, DELAYED RELEASE ORAL at 10:08

## 2022-08-24 RX ADMIN — QUETIAPINE FUMARATE 12.5 MG: 25 TABLET, FILM COATED ORAL at 08:08

## 2022-08-24 RX ADMIN — CARBIDOPA AND LEVODOPA 1 TABLET: 25; 100 TABLET ORAL at 04:08

## 2022-08-24 RX ADMIN — OXYCODONE AND ACETAMINOPHEN 1 TABLET: 325; 5 TABLET ORAL at 08:08

## 2022-08-24 RX ADMIN — CARBIDOPA AND LEVODOPA 1 SPLIT TABLET: 25; 100 TABLET ORAL at 08:08

## 2022-08-24 RX ADMIN — ASPIRIN 325 MG ORAL TABLET 325 MG: 325 PILL ORAL at 10:08

## 2022-08-24 RX ADMIN — Medication 2000 UNITS: at 10:08

## 2022-08-24 RX ADMIN — CARBIDOPA AND LEVODOPA 1 TABLET: 25; 100 TABLET ORAL at 08:08

## 2022-08-24 NOTE — HPI
Patient is an 82-year-old trans deferred from stand is after a fall and left hip fracture.  Is an intertrochanteric fracture.  The patient has a history of Parkinson's disease dementia and thyroid disease the infected me to the left breast and cataract surgery.  Left knee scope.  Pt in   for pt  and  ot

## 2022-08-24 NOTE — PT/OT/SLP EVAL
Occupational Therapy   Evaluation    Name: Rosa Alejo  MRN: [id number]  Admitting Diagnosis:  Hip fracture requiring operative repair  Recent Surgery: * No surgery found *      Recommendations:     Discharge Recommendations: home with home health  Discharge Equipment Recommendations:  walker, rolling, wheelchair  Barriers to discharge:       Assessment:     Rosa Alejo is a 82 y.o. female with a medical diagnosis of Hip fracture requiring operative repair with WBAT L hip with closed reduction and nailing  She presents with decreased balance, weakness and endurance. Pt also BP fluctuation at previous hospital .  Pt reported dizziness when ambulating and reqested sit. Performance deficits affecting function: weakness, impaired endurance, impaired self care skills, impaired functional mobility, impaired balance, decreased coordination, decreased safety awareness. Pt is unsteady at this time and is a fall risk due to weakness and balance deficits.     Rehab Prognosis: Fair; patient would benefit from acute skilled OT services to address these deficits and reach maximum level of function.       Plan:     Patient to be seen 5 x/week to address the above listed problems via self-care/home management, therapeutic activities, therapeutic exercises, neuromuscular re-education  · Plan of Care Expires:    · Plan of Care Reviewed with: patient    Subjective     Chief Complaint: decreased balance and weakness  Patient/Family Comments/goals: Pt goal is to increase mobility and ADL performance.     Occupational Profile:  Living Environment: Pt lives at home with sitters  Previous level of function: SVN-Mod I  Equipment Used at Home:  walker, rolling, wheelchair  Assistance upon Discharge: To be determined    Pain/Comfort:  · Pain Rating 1: 7/10  · Location - Side 1: Left  · Location 1: hip    Patients cultural, spiritual, Confucianist conflicts given the current situation: no    Objective:     Communicated with: Pt prior to  session.  Patient found supine with   upon OT entry to room.    General Precautions: Standard, fall   Orthopedic Precautions:    Braces:    Respiratory Status: Room air    Occupational Performance:    Bed Mobility:    · Patient completed Rolling/Turning to Right with minimum assistance  · Patient completed Supine to Sit with minimum assistance    Functional Mobility/Transfers:  · Patient completed Sit <> Stand Transfer with minimum assistance  with  rolling walker   · Patient completed Toilet Transfer Step Transfer technique with minimum assistance with  rolling walker  · Functional Mobility: Pt completed functional ambulation to the restroom using the RW for balance and safety. Pt required rest breaks due dizziness and weakness.    Activities of Daily Living:  · Lower Body Dressing: maximal assistance due to decreased LE strength  · Toileting: moderate assistance due to decreased strength and balance    Cognitive/Visual Perceptual:  Cognitive/Psychosocial Skills:     -       Oriented to: Person and Place   -       Safety awareness/insight to disability: impaired     Physical Exam:  Balance:    -       Fair minus balance  Upper Extremity Range of Motion:     -       Right Upper Extremity: WFL  -       Left Upper Extremity: WFL  Upper Extremity Strength:    -       Right Upper Extremity: 2+/5  -       Left Upper Extremity: 2+/5    AMPAC 6 Click ADL:  AMPAC Total Score: 16    Treatment & Education:  OT eval  Education:    Patient left supine with all lines intact and call button in reach    GOALS:   Multidisciplinary Problems     Occupational Therapy Goals        Problem: Occupational Therapy    Goal Priority Disciplines Outcome Interventions   Occupational Therapy Goal     OT, PT/OT Ongoing, Progressing    Description: Goals to be met by: 09/24/22     Patient will increase functional independence with ADLs by performing:    UE Dressing with Modified [place].  LE Dressing with Supervision using a/e as  needed  Grooming while standing at sink with Modified [place].  Toileting from toilet with Modified [place] and Supervision for hygiene and clothing management.   Standing x5-10 minutes with Supervision.  Step transfer with Modified [place] and Supervision  Toilet transfer to toilet with Modified [place] and Supervision.                     History:     Past Medical History:   Diagnosis Date    Dementia     Depression     Hypertension     Hypotension     Hypothyroid     OAB (overactive bladder)     chronic    Parkinson's disease        Past Surgical History:   Procedure Laterality Date    CHOLECYSTECTOMY      ESOPHAGEAL DILATION      Interstim electrode test insertion with x-ray imaging   08/10/2020    at [facility];  test implant at S3 without difficulty;  2.0 mA was the testing and point;  no complications    KNEE SURGERY      LUMPECTOMY,BREAST, WITH RADIOACTIVE SEED LOCALIZATION AND SENTINEL LYMPH NODE BIOPSY      REMOVAL OF SACRAL NEUROSTIMULATOR DEVICE Left 11/22/2021    Procedure: REMOVAL, NEUROSTIMULATOR, SACRAL;  Surgeon: Reza Murphy MD;  Location: [facility];  Service: Pain Management;  Laterality: Left;    SINUS SURGERY      TOTAL ABDOMINAL HYSTERECTOMY W/ BILATERAL SALPINGOOPHORECTOMY         Time Tracking:     OT Date of Treatment:    OT Start Time:  9:00  OT Stop Time:  9:30  OT Total Time (min):      Billable Minutes:Evaluation 30 8/24/2022

## 2022-08-24 NOTE — PT/OT/SLP EVAL
Physical Therapy Evaluation    Patient Name:  Rosa Alejo   MRN:  [id number]    Recommendations:     Discharge Recommendations:  home with home health   Discharge Equipment Recommendations: walker, rolling, wheelchair   Barriers to discharge: Decreased caregiver support    Assessment:     Rosa Alejo is a 82 y.o. female admitted with a medical diagnosis of left Hip fracture requiring operative repair.  She presents with the following impairments/functional limitations:  weakness, impaired endurance, impaired self care skills, impaired functional mobility, gait instability, impaired balance, decreased lower extremity function, pain .    Rehab Prognosis: Fair; patient would benefit from acute skilled PT services to address these deficits and reach maximum level of function.    Recent Surgery: *s/p left closed reduction and nailing 8/21/22.      Plan:     During this hospitalization, patient to be seen 5 x/week to address the identified rehab impairments via gait training, therapeutic activities, therapeutic exercises, neuromuscular re-education and progress toward the following goals:    · Plan of Care Expires:  09/22/22    Subjective     Chief Complaint: fatigue; weakness  Patient/Family Comments/goals: improve strength and mobility and return home with sitters.  Pain/Comfort:  · Pain Rating 1: 7/10  · Location - Side 1: Left  · Location - Orientation 1: lower  · Location 1: hip  · Pain Addressed 1: Reposition, Distraction  · Pain Rating Post-Intervention 1: 5/10    Patients cultural, spiritual, Gnosticism conflicts given the current situation: no    Living Environment:  Lives alone with 24/7 sitters.  Prior to admission, patients level of function was assisted; household ambulation.  Equipment used at home: bedside commode, walker, rolling, wheelchair.  DME owned (not currently used): none.  Upon discharge, patient will have assistance from sitters.    Objective:     Communicated with patient and daughter  prior to session.  Patient found up in chair with    upon PT entry to room.    General Precautions: Standard, fall   Orthopedic Precautions:LLE weight bearing as tolerated   Braces: N/A  Respiratory Status: Room air    Exams:  · Cognitive Exam:  Patient is oriented to Person, Place and Situation  · Gross Motor Coordination:  WFL  · RUE Strength: 3  · LUE Strength: 3  · RLE ROM: WFL  · RLE Strength: 3  · LLE ROM: Deficits: hip, knee  · LLE Strength: 2+    Functional Mobility:  · Bed Mobility:     · Rolling Left:  moderate assistance  · Rolling Right: moderate assistance  · Supine to Sit: moderate assistance  · Sit to Supine: moderate assistance  · Transfers:     · Sit to Stand:  moderate assistance with rolling walker  · Bed to Chair: moderate assistance with  rolling walker  using  Step Transfer  · Gait: 3 feet with RW step to gait with shuffle  · Balance: standing poor      AM-PAC 6 CLICK MOBILITY  Total Score:11     Patient left supine with call button in reach and family present.    Case conference with Shannan Velasco, PTA and POC reviewed.    GOALS:   Multidisciplinary Problems     Physical Therapy Goals        Problem: Physical Therapy    Goal Priority Disciplines Outcome Goal Variances Interventions   Physical Therapy Goal     PT, PT/OT Ongoing, Progressing     Description: Goals to be met by: 2 weeks     Patient will increase functional independence with mobility by performin. Supine to sit with MInimal Assistance  2. Sit to supine with MInimal Assistance  3. Rolling to Left and Right with Minimal Assistance.  4. Sit to stand transfer with Minimal Assistance  5. Bed to chair transfer with Minimal Assistance using Rolling Walker  6. Gait  x 50 feet with Moderate Assistance using Rolling Walker.     Goals to be met by: 4 weeks     Patient will increase functional independence with mobility by performin. Supine to sit with Stand-by Assistance  2. Sit to supine with Stand-by Assistance  3. Rolling to  Left and Right with Stand-by Assistance.  4. Sit to stand transfer with Minimal Assistance  5. Bed to chair transfer with Contact Guard Assistance using Rolling Walker  6. Gait  x 150 feet with Minimal Assistance using Rolling Walker.                      History:     Past Medical History:   Diagnosis Date    Dementia     Depression     Hypertension     Hypotension     Hypothyroid     OAB (overactive bladder)     chronic    Parkinson's disease        Past Surgical History:   Procedure Laterality Date    CHOLECYSTECTOMY      ESOPHAGEAL DILATION      Interstim electrode test insertion with x-ray imaging   08/10/2020    at [facility];  test implant at S3 without difficulty;  2.0 mA was the testing and point;  no complications    KNEE SURGERY      LUMPECTOMY,BREAST, WITH RADIOACTIVE SEED LOCALIZATION AND SENTINEL LYMPH NODE BIOPSY      REMOVAL OF SACRAL NEUROSTIMULATOR DEVICE Left 11/22/2021    Procedure: REMOVAL, NEUROSTIMULATOR, SACRAL;  Surgeon: Reza Murphy MD;  Location: [facility];  Service: Pain Management;  Laterality: Left;    SINUS SURGERY      TOTAL ABDOMINAL HYSTERECTOMY W/ BILATERAL SALPINGOOPHORECTOMY         Time Tracking:     PT Received On: 08/24/22  PT Start Time: 1130     PT Stop Time: 1145  PT Total Time (min): 15 min     Billable Minutes: Evaluation 15      08/24/2022

## 2022-08-24 NOTE — PLAN OF CARE
Problem: Occupational Therapy  Goal: Occupational Therapy Goal  Description: Goals to be met by: 09/24/22     Patient will increase functional independence with ADLs by performing:    UE Dressing with Modified [place].  LE Dressing with Supervision using a/e as needed  Grooming while standing at sink with Modified [place].  Toileting from toilet with Modified [place] and Supervision for hygiene and clothing management.   Standing x5-10 minutes with Supervision.  Step transfer with Modified [place] and Supervision  Toilet transfer to toilet with Modified [place] and Supervision.    Outcome: Ongoing, Progressing

## 2022-08-24 NOTE — PLAN OF CARE
[facility] - Medical Surgical Unit  Initial Discharge Assessment       Primary Care Provider: Reza Murphy MD    Admission Diagnosis: Hip fracture requiring operative repair [S72.009A]    Admission Date: 8/23/2022  Expected Discharge Date:     Discharge Barriers Identified: None    Payor: MEDICARE / Plan: MEDICARE PART A & B / Product Type: Government /     Extended Emergency Contact Information  Primary Emergency Contact: Patti Zambrano  Mobile Phone: [phone]  Relation: Daughter  Preferred language: English   needed? No  Secondary Emergency Contact: Jey Alejo  Mobile Phone: [phone]  Relation: Son  Preferred language: English   needed? No    Discharge Plan A: Home with family, Home Health  Discharge Plan B: Skilled Nursing Facility      [facility] [place] [id number] [address]  [address] [address]  [place] [facility] [address]  Phone: [phone] Fax: [phone]      Initial Assessment (most recent)     Adult Discharge Assessment - 08/24/22 0937        Discharge Assessment    Assessment Type Discharge Planning Assessment     Confirmed/corrected address, phone number and insurance Yes     Confirmed Demographics Correct on Facesheet     Source of Information patient;family;health record     If unable to respond/provide information was family/caregiver contacted? Yes     Contact Name/Number Jey Alejo, son [phone]     Communicated BRODERICK with patient/caregiver Date not available/Unable to determine     Reason For Admission Fall with Lt. hip fracture, dehydration     Lives With alone;other (see comments)   has sitters and family who alternate staying with pt.    Facility Arrived From: [facility]     Do you expect to return to your current living situation? Yes     Do you have help at home or someone to help you manage your care at home? Yes     Who are your caregiver(s) and their phone number(s)? Andreea Ramirez, daughter [phone] and sonJey      Prior to hospitilization cognitive status: Unable to Assess     Current cognitive status: Not Oriented to Time;Not Oriented to Place     Walking or Climbing Stairs Difficulty ambulation difficulty, requires equipment;ambulation difficulty, assistance 1 person;stair climbing difficulty, dependent;transferring difficulty, assistance 1 person;transferring difficulty, requires equipment     Mobility Management RW     Dressing/Bathing Difficulty bathing difficulty, assistance 1 person;dressing difficulty, assistance 1 person     Home Layout Able to live on 1st floor     Equipment Currently Used at Home walker, rolling;cane, straight     Readmission within 30 days? No     Patient currently being followed by outpatient case management? No     Do you currently have service(s) that help you manage your care at home? Yes     Name and Contact number of agency [facility]     Is the pt/caregiver preference to resume services with current agency Yes     Do you take prescription medications? Yes     Do you have prescription coverage? Yes     Coverage Medicare     Do you have any problems affording any of your prescribed medications? No     Is the patient taking medications as prescribed? yes     Who is going to help you get home at discharge? Jey Alejo, son     How do you get to doctors appointments? family or friend will provide     Are you on dialysis? No     Do you take coumadin? No     Discharge Plan A Home with family;Home Health     Discharge Plan B Skilled Nursing Facility     DME Needed Upon Discharge  bedside commode;wheelchair;walker, rolling     Discharge Plan discussed with: Patient;Adult children     Discharge Barriers Identified None               Pt. Admitted to swing bed services for continued rehabilitation, pain management, and supportive care following a fall with Lt. Femur fracture with nailing done. Pt. Has a history of Parkinson's Disease and Dementia and was living at home with daughter or son and  sitters staying with her at all times. Pt. Was ambulatory at home with a cane or RW but admits to holding onto furniture most of the time to get around in the home. Pt. Has HH services in which the son thinks it is through [facility]. Son, Jey, states that pt. Daughter, Patti, may want to change HH agencies when pt. Is d/c home. Plans are to return home with HH and sitters at d/c from swing bed. Will cont. To follow pt. And assist as needed.

## 2022-08-24 NOTE — H&P
[facility] - [facility] Surgical Unit  Hospital Medicine  History & Physical    Patient Name: Rosa Alejo  MRN: [id number]  Patient Class: IP- Swing  Admission Date: 8/23/2022  Attending Physician: Pérez Skinner DO   Primary Care Provider: Reza Murphy MD         Patient information was obtained from ER records.     Subjective:     Principal Problem:Hip fracture requiring operative repair    Chief Complaint: No chief complaint on file.       HPI: Patient is an 82-year-old trans deferred from stand is after a fall and left hip fracture.  Is an intertrochanteric fracture.  The patient has a history of Parkinson's disease dementia and thyroid disease the infected me to the left breast and cataract surgery.  Left knee scope.  Pt in   for pt  and  ot      Past Medical History:   Diagnosis Date    Dementia     Depression     Hypertension     Hypotension     Hypothyroid     OAB (overactive bladder)     chronic    Parkinson's disease        Past Surgical History:   Procedure Laterality Date    CHOLECYSTECTOMY      ESOPHAGEAL DILATION      Interstim electrode test insertion with x-ray imaging   08/10/2020    at [facility];  test implant at S3 without difficulty;  2.0 mA was the testing and point;  no complications    KNEE SURGERY      LUMPECTOMY,BREAST, WITH RADIOACTIVE SEED LOCALIZATION AND SENTINEL LYMPH NODE BIOPSY      REMOVAL OF SACRAL NEUROSTIMULATOR DEVICE Left 11/22/2021    Procedure: REMOVAL, NEUROSTIMULATOR, SACRAL;  Surgeon: Reza Murphy MD;  Location: [facility] OR;  Service: Pain Management;  Laterality: Left;    SINUS SURGERY      TOTAL ABDOMINAL HYSTERECTOMY W/ BILATERAL SALPINGOOPHORECTOMY         Review of patient's allergies indicates:   Allergen Reactions    Sulfa (sulfonamide antibiotics) Rash       No current facility-administered medications on file prior to encounter.     Current Outpatient Medications on File Prior to Encounter    Medication Sig    aspirin 325 MG tablet Take 325 mg by mouth once daily.    CALCIUM CARBONATE-VITAMIN D3 ORAL Take 1 tablet by mouth once daily.    carbidopa-levodopa  mg (SINEMET)  mg per tablet Take 1.5 tablets by mouth 4 (four) times daily.    cetirizine (ZYRTEC) 10 MG tablet Take 1 tablet (10 mg total) by mouth once daily.    cholecalciferol, vitamin D3, (VITAMIN D3) 50 mcg (2,000 unit) Cap Take 2,000 Units by mouth once daily.    levothyroxine (SYNTHROID) 88 MCG tablet Take 1 tablet (88 mcg total) by mouth before breakfast. (Patient taking differently: Take 88 mcg by mouth every Mon, Tues, Wed, Thurs, Fri.)    levothyroxine (SYNTHROID) 88 MCG tablet Take 44 mcg by mouth every Sat, Sun.    linaCLOtide (LINZESS) 145 mcg Cap capsule Take 145 mcg by mouth Daily.    ondansetron (ZOFRAN) 4 MG tablet Take 4 mg by mouth every 12 (twelve) hours.    oxyCODONE-acetaminophen (PERCOCET) 5-325 mg per tablet Take 1 tablet by mouth every 4 (four) hours as needed for Pain.    pantoprazole (PROTONIX) 40 MG tablet 40 mg once daily.    QUEtiapine (SEROQUEL) 25 MG Tab Take 12.5 mg by mouth nightly.    venlafaxine (EFFEXOR-XR) 75 MG 24 hr capsule Take 75 mg by mouth once daily.     calcium carbonate-vitamin D3 (CALTRATE 600 PLUS D) 600 mg (1,500 mg)-800 unit Chew     donepeziL (ARICEPT) 10 MG tablet Take 10 mg by mouth once daily.    ibuprofen (ADVIL,MOTRIN) 800 MG tablet Take 0.5 tablets (400 mg total) by mouth every 6 (six) hours as needed for Pain.    montelukast (SINGULAIR) 10 mg tablet Take by mouth.    nystatin (MYCOSTATIN) 100,000 unit/mL suspension 5 mL swish and spit 4 times daily as needed for thrush    pimavanserin (NUPLAZID) 34 mg Cap Take 1 capsule by mouth once daily.    potassium chloride (MICRO-K) 10 MEQ CpSR Take 2 capsules (20 mEq total) by mouth 2 (two) times daily. Take 2 tablets twice a day (Patient taking differently: Take 20 mEq by mouth 2 (two) times daily. Resume on 8/29/22  as warranted at [facility])    pyridostigmine (MESTINON) 60 mg Tab 30 mg 2 (two) times a day.     traMADoL (ULTRAM) 50 mg tablet as needed.     Family History       Problem Relation (Age of Onset)    Heart disease Father, Mother          Tobacco Use    Smoking status: Never Smoker    Smokeless tobacco: Never Used   Substance and Sexual Activity    Alcohol use: Not Currently    Drug use: Never    Sexual activity: Not Currently     Review of Systems   Constitutional: Negative.    HENT: Negative.     Eyes: Negative.    Respiratory: Negative.     Cardiovascular: Negative.    Gastrointestinal: Negative.    Endocrine: Negative.    Genitourinary: Negative.    Musculoskeletal: Negative.    Skin: Negative.    Allergic/Immunologic: Negative.    Neurological: Negative.    Hematological: Negative.    Psychiatric/Behavioral: Negative.     All other systems reviewed and are negative.  Objective:     Vital Signs (Most Recent):  Temp: 98.1 °F (36.7 °C) (08/24/22 0733)  Pulse: 83 (08/24/22 0733)  Resp: 18 (08/24/22 0852)  BP: 107/68 (08/24/22 0915)  SpO2: 98 % (08/24/22 0733) Vital Signs (24h Range):  Temp:  [97.6 °F (36.4 °C)-98.1 °F (36.7 °C)] 98.1 °F (36.7 °C)  Pulse:  [79-83] 83  Resp:  [18-20] 18  SpO2:  [97 %-100 %] 98 %  BP: (107-146)/(68-81) 107/68     Weight: 47.4 kg (104 lb 8 oz)  Body mass index is 16.37 kg/m².    Physical Exam  Vitals and nursing note reviewed.   Constitutional:       Appearance: Normal appearance. She is normal weight.   HENT:      Head: Normocephalic and atraumatic.      Right Ear: Tympanic membrane, ear canal and external ear normal.      Left Ear: Tympanic membrane and external ear normal.      Nose: Nose normal.      Mouth/Throat:      Mouth: Mucous membranes are moist.   Eyes:      Extraocular Movements: Extraocular movements intact.      Conjunctiva/sclera: Conjunctivae normal.      Pupils: Pupils are equal, round, and reactive to light.   Cardiovascular:      Rate and Rhythm: Normal rate.       Pulses: Normal pulses.      Heart sounds: Normal heart sounds.   Pulmonary:      Effort: Pulmonary effort is normal.      Breath sounds: Normal breath sounds.   Abdominal:      General: Abdomen is flat. Bowel sounds are normal.      Palpations: Abdomen is soft.   Musculoskeletal:         General: Normal range of motion.      Cervical back: Normal range of motion and neck supple.      Comments: Pain with disc crease range of motion of the left hip secondary surgery.  Nausea.   Skin:     General: Skin is warm and dry.      Capillary Refill: Capillary refill takes less than 2 seconds.   Neurological:      General: No focal deficit present.      Mental Status: She is alert and oriented to person, place, and time. Mental status is at baseline.   Psychiatric:         Mood and Affect: Mood normal.         Behavior: Behavior normal.         Thought Content: Thought content normal.         Judgment: Judgment normal.         CRANIAL NERVES     CN III, IV, VI   Pupils are equal, round, and reactive to light.     Significant Labs: All pertinent labs within the past 24 hours have been reviewed.    Significant Imaging: I have reviewed all pertinent imaging results/findings within the past 24 hours.    Assessment/Plan:     No notes have been filed under this hospital service.  Service: Hospital Medicine    VTE Risk Mitigation (From admission, onward)    None             Pérez Skinner DO  Department of Hospital Medicine   [facility]

## 2022-08-24 NOTE — PLAN OF CARE
Problem: Physical Therapy  Goal: Physical Therapy Goal  Description: Goals to be met by: 2 weeks     Patient will increase functional independence with mobility by performin. Supine to sit with MInimal Assistance  2. Sit to supine with MInimal Assistance  3. Rolling to Left and Right with Minimal Assistance.  4. Sit to stand transfer with Minimal Assistance  5. Bed to chair transfer with Minimal Assistance using Rolling Walker  6. Gait  x 50 feet with Moderate Assistance using Rolling Walker.     Goals to be met by: 4 weeks     Patient will increase functional independence with mobility by performin. Supine to sit with Stand-by Assistance  2. Sit to supine with Stand-by Assistance  3. Rolling to Left and Right with Stand-by Assistance.  4. Sit to stand transfer with Minimal Assistance  5. Bed to chair transfer with Contact Guard Assistance using Rolling Walker  6. Gait  x 150 feet with Minimal Assistance using Rolling Walker.     Outcome: Ongoing, Progressing

## 2022-08-24 NOTE — SUBJECTIVE & OBJECTIVE
Past Medical History:   Diagnosis Date    Dementia     Depression     Hypertension     Hypotension     Hypothyroid     OAB (overactive bladder)     chronic    Parkinson's disease        Past Surgical History:   Procedure Laterality Date    CHOLECYSTECTOMY      ESOPHAGEAL DILATION      Interstim electrode test insertion with x-ray imaging   08/10/2020    at [facility];  test implant at S3 without difficulty;  2.0 mA was the testing and point;  no complications    KNEE SURGERY      LUMPECTOMY,BREAST, WITH RADIOACTIVE SEED LOCALIZATION AND SENTINEL LYMPH NODE BIOPSY      REMOVAL OF SACRAL NEUROSTIMULATOR DEVICE Left 11/22/2021    Procedure: REMOVAL, NEUROSTIMULATOR, SACRAL;  Surgeon: Reza Murphy MD;  Location: [facility] OR;  Service: Pain Management;  Laterality: Left;    SINUS SURGERY      TOTAL ABDOMINAL HYSTERECTOMY W/ BILATERAL SALPINGOOPHORECTOMY         Review of patient's allergies indicates:   Allergen Reactions    Sulfa (sulfonamide antibiotics) Rash       No current facility-administered medications on file prior to encounter.     Current Outpatient Medications on File Prior to Encounter   Medication Sig    aspirin 325 MG tablet Take 325 mg by mouth once daily.    CALCIUM CARBONATE-VITAMIN D3 ORAL Take 1 tablet by mouth once daily.    carbidopa-levodopa  mg (SINEMET)  mg per tablet Take 1.5 tablets by mouth 4 (four) times daily.    cetirizine (ZYRTEC) 10 MG tablet Take 1 tablet (10 mg total) by mouth once daily.    cholecalciferol, vitamin D3, (VITAMIN D3) 50 mcg (2,000 unit) Cap Take 2,000 Units by mouth once daily.    levothyroxine (SYNTHROID) 88 MCG tablet Take 1 tablet (88 mcg total) by mouth before breakfast. (Patient taking differently: Take 88 mcg by mouth every Mon, Tues, Wed, Thurs, Fri.)    levothyroxine (SYNTHROID) 88 MCG tablet Take 44 mcg by mouth every Sat, Sun.    linaCLOtide (LINZESS) 145 mcg Cap capsule Take 145 mcg by mouth Daily.     ondansetron (ZOFRAN) 4 MG tablet Take 4 mg by mouth every 12 (twelve) hours.    oxyCODONE-acetaminophen (PERCOCET) 5-325 mg per tablet Take 1 tablet by mouth every 4 (four) hours as needed for Pain.    pantoprazole (PROTONIX) 40 MG tablet 40 mg once daily.    QUEtiapine (SEROQUEL) 25 MG Tab Take 12.5 mg by mouth nightly.    venlafaxine (EFFEXOR-XR) 75 MG 24 hr capsule Take 75 mg by mouth once daily.     calcium carbonate-vitamin D3 (CALTRATE 600 PLUS D) 600 mg (1,500 mg)-800 unit Chew     donepeziL (ARICEPT) 10 MG tablet Take 10 mg by mouth once daily.    ibuprofen (ADVIL,MOTRIN) 800 MG tablet Take 0.5 tablets (400 mg total) by mouth every 6 (six) hours as needed for Pain.    montelukast (SINGULAIR) 10 mg tablet Take by mouth.    nystatin (MYCOSTATIN) 100,000 unit/mL suspension 5 mL swish and spit 4 times daily as needed for thrush    pimavanserin (NUPLAZID) 34 mg Cap Take 1 capsule by mouth once daily.    potassium chloride (MICRO-K) 10 MEQ CpSR Take 2 capsules (20 mEq total) by mouth 2 (two) times daily. Take 2 tablets twice a day (Patient taking differently: Take 20 mEq by mouth 2 (two) times daily. Resume on 8/29/22 as warranted at [facility])    pyridostigmine (MESTINON) 60 mg Tab 30 mg 2 (two) times a day.     traMADoL (ULTRAM) 50 mg tablet as needed.     Family History       Problem Relation (Age of Onset)    Heart disease Father, Mother          Tobacco Use    Smoking status: Never Smoker    Smokeless tobacco: Never Used   Substance and Sexual Activity    Alcohol use: Not Currently    Drug use: Never    Sexual activity: Not Currently     Review of Systems   Constitutional: Negative.    HENT: Negative.     Eyes: Negative.    Respiratory: Negative.     Cardiovascular: Negative.    Gastrointestinal: Negative.    Endocrine: Negative.    Genitourinary: Negative.    Musculoskeletal: Negative.    Skin: Negative.    Allergic/Immunologic: Negative.    Neurological: Negative.    Hematological: Negative.     Psychiatric/Behavioral: Negative.     All other systems reviewed and are negative.  Objective:     Vital Signs (Most Recent):  Temp: 98.1 °F (36.7 °C) (08/24/22 0733)  Pulse: 83 (08/24/22 0733)  Resp: 18 (08/24/22 0852)  BP: 107/68 (08/24/22 0915)  SpO2: 98 % (08/24/22 0733) Vital Signs (24h Range):  Temp:  [97.6 °F (36.4 °C)-98.1 °F (36.7 °C)] 98.1 °F (36.7 °C)  Pulse:  [79-83] 83  Resp:  [18-20] 18  SpO2:  [97 %-100 %] 98 %  BP: (107-146)/(68-81) 107/68     Weight: 47.4 kg (104 lb 8 oz)  Body mass index is 16.37 kg/m².    Physical Exam  Vitals and nursing note reviewed.   Constitutional:       Appearance: Normal appearance. She is normal weight.   HENT:      Head: Normocephalic and atraumatic.      Right Ear: Tympanic membrane, ear canal and external ear normal.      Left Ear: Tympanic membrane and external ear normal.      Nose: Nose normal.      Mouth/Throat:      Mouth: Mucous membranes are moist.   Eyes:      Extraocular Movements: Extraocular movements intact.      Conjunctiva/sclera: Conjunctivae normal.      Pupils: Pupils are equal, round, and reactive to light.   Cardiovascular:      Rate and Rhythm: Normal rate.      Pulses: Normal pulses.      Heart sounds: Normal heart sounds.   Pulmonary:      Effort: Pulmonary effort is normal.      Breath sounds: Normal breath sounds.   Abdominal:      General: Abdomen is flat. Bowel sounds are normal.      Palpations: Abdomen is soft.   Musculoskeletal:         General: Normal range of motion.      Cervical back: Normal range of motion and neck supple.      Comments: Pain with disc crease range of motion of the left hip secondary surgery.  Nausea.   Skin:     General: Skin is warm and dry.      Capillary Refill: Capillary refill takes less than 2 seconds.   Neurological:      General: No focal deficit present.      Mental Status: She is alert and oriented to person, place, and time. Mental status is at baseline.   Psychiatric:         Mood and Affect: Mood  normal.         Behavior: Behavior normal.         Thought Content: Thought content normal.         Judgment: Judgment normal.         CRANIAL NERVES     CN III, IV, VI   Pupils are equal, round, and reactive to light.     Significant Labs: All pertinent labs within the past 24 hours have been reviewed.    Significant Imaging: I have reviewed all pertinent imaging results/findings within the past 24 hours.

## 2022-08-24 NOTE — PLAN OF CARE
Problem: Adult Inpatient Plan of Care  Goal: Plan of Care Review  Outcome: Ongoing, Progressing  Goal: Patient-Specific Goal (Individualized)  Outcome: Ongoing, Progressing  Goal: Absence of Hospital-Acquired Illness or Injury  Outcome: Ongoing, Progressing  Goal: Optimal Comfort and Wellbeing  Outcome: Ongoing, Progressing  Goal: Readiness for Transition of Care  Outcome: Ongoing, Progressing     Problem: Fall Injury Risk  Goal: Absence of Fall and Fall-Related Injury  Outcome: Ongoing, Progressing     Problem: Pain Acute  Goal: Acceptable Pain Control and Functional Ability  Outcome: Ongoing, Progressing     Problem: Skin Injury Risk Increased  Goal: Skin Health and Integrity  Outcome: Ongoing, Progressing     Problem: Adjustment to Injury (Hip Fracture Medical Management)  Goal: Optimal Coping with Change in Health Status  Outcome: Ongoing, Progressing     Problem: Bowel Elimination Impaired (Hip Fracture Medical Management)  Goal: Effective Bowel Elimination  Outcome: Ongoing, Progressing     Problem: Cognitive Decline Risk (Hip Fracture Medical Management)  Goal: Baseline Cognitive Function Maintained  Outcome: Ongoing, Progressing     Problem: Embolism (Hip Fracture Medical Management)  Goal: Absence of Embolism  Outcome: Ongoing, Progressing     Problem: Fracture Stabilization and Management (Hip Fracture Medical Management)  Goal: Fracture Stability  Outcome: Ongoing, Progressing     Problem: Functional Ability Impaired (Hip Fracture Medical Management)  Goal: Optimal Functional Performance  Outcome: Ongoing, Progressing     Problem: Pain (Hip Fracture Medical Management)  Goal: Acceptable Pain Level  Outcome: Ongoing, Progressing     Problem: Urinary Elimination Impaired (Hip Fracture Medical Management)  Goal: Effective Urinary Elimination  Outcome: Ongoing, Progressing

## 2022-08-25 PROCEDURE — 97116 GAIT TRAINING THERAPY: CPT | Mod: CQ

## 2022-08-25 PROCEDURE — 97530 THERAPEUTIC ACTIVITIES: CPT

## 2022-08-25 PROCEDURE — 25000003 PHARM REV CODE 250: Performed by: NURSE PRACTITIONER

## 2022-08-25 PROCEDURE — 25000003 PHARM REV CODE 250: Performed by: FAMILY MEDICINE

## 2022-08-25 PROCEDURE — 97530 THERAPEUTIC ACTIVITIES: CPT | Mod: CQ

## 2022-08-25 PROCEDURE — 11000004 HC SNF PRIVATE

## 2022-08-25 PROCEDURE — 27000941

## 2022-08-25 PROCEDURE — 63700000 PHARM REV CODE 250 ALT 637 W/O HCPCS: Performed by: FAMILY MEDICINE

## 2022-08-25 PROCEDURE — 97535 SELF CARE MNGMENT TRAINING: CPT

## 2022-08-25 PROCEDURE — 97110 THERAPEUTIC EXERCISES: CPT

## 2022-08-25 PROCEDURE — 97110 THERAPEUTIC EXERCISES: CPT | Mod: CQ

## 2022-08-25 RX ORDER — NYSTATIN 100000 [USP'U]/ML
500000 SUSPENSION ORAL 4 TIMES DAILY
Status: DISCONTINUED | OUTPATIENT
Start: 2022-08-25 | End: 2022-09-16 | Stop reason: HOSPADM

## 2022-08-25 RX ORDER — FLUCONAZOLE 100 MG/1
200 TABLET ORAL DAILY
Status: COMPLETED | OUTPATIENT
Start: 2022-08-25 | End: 2022-08-27

## 2022-08-25 RX ORDER — CARBIDOPA AND LEVODOPA 25; 100 MG/1; MG/1
1 TABLET ORAL 4 TIMES DAILY
Status: DISCONTINUED | OUTPATIENT
Start: 2022-08-25 | End: 2022-09-16 | Stop reason: HOSPADM

## 2022-08-25 RX ADMIN — NYSTATIN 500000 UNITS: 500000 SUSPENSION ORAL at 04:08

## 2022-08-25 RX ADMIN — NYSTATIN 500000 UNITS: 500000 SUSPENSION ORAL at 08:08

## 2022-08-25 RX ADMIN — NYSTATIN 500000 UNITS: 500000 SUSPENSION ORAL at 12:08

## 2022-08-25 RX ADMIN — Medication 1 TABLET: at 08:08

## 2022-08-25 RX ADMIN — FLUCONAZOLE 200 MG: 100 TABLET ORAL at 12:08

## 2022-08-25 RX ADMIN — CARBIDOPA AND LEVODOPA 1 TABLET: 25; 100 TABLET ORAL at 04:08

## 2022-08-25 RX ADMIN — LEVOTHYROXINE SODIUM 88 MCG: 0.09 TABLET ORAL at 05:08

## 2022-08-25 RX ADMIN — TRAMADOL HYDROCHLORIDE 50 MG: 50 TABLET, COATED ORAL at 09:08

## 2022-08-25 RX ADMIN — MUPIROCIN: 20 OINTMENT TOPICAL at 09:08

## 2022-08-25 RX ADMIN — LINACLOTIDE 144 MCG: 72 CAPSULE, GELATIN COATED ORAL at 05:08

## 2022-08-25 RX ADMIN — Medication 2000 UNITS: at 08:08

## 2022-08-25 RX ADMIN — CARBIDOPA AND LEVODOPA 1 TABLET: 25; 100 TABLET ORAL at 08:08

## 2022-08-25 RX ADMIN — VENLAFAXINE HYDROCHLORIDE 75 MG: 75 CAPSULE, EXTENDED RELEASE ORAL at 08:08

## 2022-08-25 RX ADMIN — PANTOPRAZOLE SODIUM 20 MG: 20 TABLET, DELAYED RELEASE ORAL at 08:08

## 2022-08-25 RX ADMIN — DOCUSATE SODIUM 100 MG: 100 CAPSULE, LIQUID FILLED ORAL at 08:08

## 2022-08-25 RX ADMIN — QUETIAPINE FUMARATE 12.5 MG: 25 TABLET, FILM COATED ORAL at 08:08

## 2022-08-25 RX ADMIN — CARBIDOPA AND LEVODOPA 1 TABLET: 25; 100 TABLET ORAL at 06:08

## 2022-08-25 RX ADMIN — DONEPEZIL HYDROCHLORIDE 10 MG: 5 TABLET, FILM COATED ORAL at 08:08

## 2022-08-25 RX ADMIN — CARBIDOPA AND LEVODOPA 1 TABLET: 25; 100 TABLET ORAL at 12:08

## 2022-08-25 RX ADMIN — ASPIRIN 325 MG ORAL TABLET 325 MG: 325 PILL ORAL at 08:08

## 2022-08-25 RX ADMIN — CETIRIZINE HYDROCHLORIDE 10 MG: 10 TABLET, FILM COATED ORAL at 08:08

## 2022-08-25 NOTE — PT/OT/SLP PROGRESS
Physical Therapy Treatment    Patient Name:  Rosa Alejo   MRN:  [id number]    Recommendations:     Discharge Recommendations:  home with home health   Discharge Equipment Recommendations: walker, rolling, wheelchair   Barriers to discharge: None    Assessment:     Rosa Alejo is a 82 y.o. female admitted with a medical diagnosis of Hip fracture requiring operative repair.  She presents with the following impairments/functional limitations:  weakness, impaired endurance, gait instability, impaired balance, impaired cognition, decreased lower extremity function, decreased safety awareness, decreased ROM, orthopedic precautions.    Pt tolerated tx fair with moderate vc on proper technique while participating with bed to WC t/f and gait training. Pt appeared to be hesitate before gait training requiring reassurance from PTA and son to complete task. Pt demo good understanding of tasks asked to perform throughout tx.    Rehab Prognosis: Fair; patient would benefit from acute skilled PT services to address these deficits and reach maximum level of function.    Recent Surgery: * No surgery found *      Plan:     During this hospitalization, patient to be seen 5 x/week to address the identified rehab impairments via gait training, therapeutic exercises, therapeutic activities and progress toward the following goals:    · Plan of Care Expires:  09/22/22    Subjective     Chief Complaint: overall fatigue   Patient/Family Comments/goals: return back home  Pain/Comfort:  · Pain Rating 1: 0/10  · Location - Side 1: Left  · Location - Orientation 1: lower  · Location 1: hip      Objective:     Communicated with patient and son prior to session.  Patient found supine with son present at bedside   upon PT entry to room.     General Precautions: Standard, fall   Orthopedic Precautions:LLE weight bearing as tolerated   Braces:    Respiratory Status: Room air     Functional Mobility:  · Bed Mobility:     · Rolling Right:  moderate assistance  · Supine to Sit: moderate assistance  · Transfers:     · Sit to Stand:  moderate assistance with rolling walker  · Gait: 5ft using RW [place]-ModA due to fear of falling slow shuffle gait pattern  · Balance: P+      AM-PAC 6 CLICK MOBILITY  Turning over in bed (including adjusting bedclothes, sheets and blankets)?: 3  Sitting down on and standing up from a chair with arms (e.g., wheelchair, bedside commode, etc.): 2  Moving from lying on back to sitting on the side of the bed?: 2  Moving to and from a bed to a chair (including a wheelchair)?: 2  Need to walk in hospital room?: 2  Climbing 3-5 steps with a railing?: 1  Basic Mobility Total Score: 12       Therapeutic Activities and Exercises:   Nustep x 10min-- to improve left hip ROM  Seated LAQS, seated marches, seated ankle pumps, seated hip ball squeezes 2 x 10    Patient left up in chair with call button in reach and son present..    GOALS:   Multidisciplinary Problems     Physical Therapy Goals        Problem: Physical Therapy    Goal Priority Disciplines Outcome Goal Variances Interventions   Physical Therapy Goal     PT, PT/OT Ongoing, Progressing     Description: Goals to be met by: 2 weeks     Patient will increase functional independence with mobility by performin. Supine to sit with MInimal Assistance  2. Sit to supine with MInimal Assistance  3. Rolling to Left and Right with Minimal Assistance.  4. Sit to stand transfer with Minimal Assistance  5. Bed to chair transfer with Minimal Assistance using Rolling Walker  6. Gait  x 50 feet with Moderate Assistance using Rolling Walker.     Goals to be met by: 4 weeks     Patient will increase functional independence with mobility by performin. Supine to sit with Stand-by Assistance  2. Sit to supine with Stand-by Assistance  3. Rolling to Left and Right with Stand-by Assistance.  4. Sit to stand transfer with Minimal Assistance  5. Bed to chair transfer with Contact Guard  Assistance using Rolling Walker  6. Gait  x 150 feet with Minimal Assistance using Rolling Walker.                      Time Tracking:     PT Received On: 08/25/22  PT Start Time: 1050     PT Stop Time: 1130  PT Total Time (min): 40 min     Billable Minutes: Gait Training 10, Therapeutic Activity 15 and Therapeutic Exercise 15    Treatment Type: Treatment  PT/PTA: PTA     PTA Visit Number: 1     08/25/2022

## 2022-08-25 NOTE — PT/OT/SLP PROGRESS
Occupational Therapy   Treatment    Name: Rosa Alejo  MRN: [id number]  Admitting Diagnosis:  Hip fracture requiring operative repair; Fall introchanteric fx of L femur; s/p closed reduction and nailing on 8/21/22      Recommendations:     Discharge Recommendations: home with home health  Discharge Equipment Recommendations:  walker, rolling, wheelchair  Barriers to discharge:       Assessment:     Rosa Alejo is a 82 y.o. female with a medical diagnosis of Hip fracture requiring operative repair Fall introchanteric fx of L femur; s/p closed reduction and nailing on 8/21/22.  She presents with decreased balance, safety, strength and mobility. Performance deficits affecting function are weakness, impaired endurance, impaired self care skills, impaired functional mobility, impaired balance, decreased coordination, decreased safety awareness. Pt noted to have decreased energy and increased fatigue. Pt was convinced to participate in therapy and was given therapeutic rest breaks as needed.      Rehab Prognosis:  Fair; patient would benefit from acute skilled OT services to address these deficits and reach maximum level of function.       Plan:     Patient to be seen 5 x/week to address the above listed problems via self-care/home management, therapeutic activities, therapeutic exercises, neuromuscular re-education  · Plan of Care Expires:    · Plan of Care Reviewed with: patient    Subjective     Pain/Comfort:  ·      Objective:     Communicated with: Pt prior to session.  Patient found supine   upon OT entry to room.    General Precautions: Standard, fall   Orthopedic Precautions:    Braces:    Respiratory Status: Room air     Occupational Performance:     Bed Mobility:    · Patient completed Rolling/Turning to Right with contact guard assistance  · Patient completed Supine to Sit with contact guard assistance     Functional Mobility/Transfers:  · Patient completed Sit <> Stand Transfer with minimum assistance   with  rolling walker   · Patient completed Bed <> Chair Transfer using Step Transfer technique with minimum assistance with rolling walker  · Functional Mobility: Pt completed functional ambulation to the restroom using the RW for balance and safety with Min A from OT.     Activities of Daily Living:  · Toileting: minimum assistance using the grab bars and cues from OT for fall prevention   · Toilet t/f: Min A using the RW for balance and safety for balance and safety.      [facility] 6 Click ADL:      Treatment & Education:  Therapeutic exercise:  While sitting in the w/c, the pt [id number] mins on arm ergometer to increase endurance, BUE strength and activity tolerance for ADL performance      Therapeutic activity:  Using the RW, the pt stood to place pegs from peg board on the wall to increase standing tolerance, dynamic balance and endurance x 5mins    Patient left supine with all lines intact and call button in reachEducation:      GOALS:   Multidisciplinary Problems     Occupational Therapy Goals        Problem: Occupational Therapy    Goal Priority Disciplines Outcome Interventions   Occupational Therapy Goal     OT, PT/OT Ongoing, Progressing    Description: Goals to be met by: 09/24/22     Patient will increase functional independence with ADLs by performing:    UE Dressing with Modified [employer].  LE Dressing with Supervision using a/e as needed  Grooming while standing at sink with Modified [employer].  Toileting from toilet with Modified [employer] and Supervision for hygiene and clothing management.   Standing x5-10 minutes with Supervision.  Step transfer with Modified [employer] and Supervision  Toilet transfer to toilet with Modified [employer] and Supervision.                     Time Tracking:     OT Date of Treatment:    OT Start Time:  3:00  OT Stop Time:  3:45  OT Total Time (min):      Billable Minutes:Self Care/Home Management 20  Therapeutic Activity 15  Therapeutic Exercise  10    OT/GIOVANNI: OT          8/25/2022

## 2022-08-25 NOTE — PROGRESS NOTES
Patients family states the patient does not take a half tablet any long. The patient only take carb/levo 25/100 1 tab po qid.         Thank You

## 2022-08-25 NOTE — NURSING
Went in patients room with CNA to turn and dry pt. Pt daughter stated she had already removed the pillow from behind the pt back to make her comfortable. Me and CNA checked patients brief and she was dry. Asked patient if she needed us to do anything else and she said no. Bed lowest position, bed alarm on, side rails up, no needs voiced at this time

## 2022-08-26 PROCEDURE — 27000941

## 2022-08-26 PROCEDURE — 94761 N-INVAS EAR/PLS OXIMETRY MLT: CPT

## 2022-08-26 PROCEDURE — 11000004 HC SNF PRIVATE

## 2022-08-26 PROCEDURE — 63700000 PHARM REV CODE 250 ALT 637 W/O HCPCS: Performed by: FAMILY MEDICINE

## 2022-08-26 PROCEDURE — 25000003 PHARM REV CODE 250: Performed by: NURSE PRACTITIONER

## 2022-08-26 PROCEDURE — 97116 GAIT TRAINING THERAPY: CPT | Mod: CQ

## 2022-08-26 PROCEDURE — 97530 THERAPEUTIC ACTIVITIES: CPT | Mod: CQ

## 2022-08-26 PROCEDURE — 97110 THERAPEUTIC EXERCISES: CPT | Mod: CQ

## 2022-08-26 PROCEDURE — 25000003 PHARM REV CODE 250: Performed by: FAMILY MEDICINE

## 2022-08-26 RX ADMIN — QUETIAPINE FUMARATE 12.5 MG: 25 TABLET, FILM COATED ORAL at 08:08

## 2022-08-26 RX ADMIN — DONEPEZIL HYDROCHLORIDE 10 MG: 5 TABLET, FILM COATED ORAL at 08:08

## 2022-08-26 RX ADMIN — Medication 1 TABLET: at 08:08

## 2022-08-26 RX ADMIN — MUPIROCIN: 20 OINTMENT TOPICAL at 08:08

## 2022-08-26 RX ADMIN — NYSTATIN 500000 UNITS: 500000 SUSPENSION ORAL at 12:08

## 2022-08-26 RX ADMIN — ASPIRIN 325 MG ORAL TABLET 325 MG: 325 PILL ORAL at 08:08

## 2022-08-26 RX ADMIN — FLUCONAZOLE 200 MG: 100 TABLET ORAL at 08:08

## 2022-08-26 RX ADMIN — DOCUSATE SODIUM 100 MG: 100 CAPSULE, LIQUID FILLED ORAL at 08:08

## 2022-08-26 RX ADMIN — NYSTATIN 500000 UNITS: 500000 SUSPENSION ORAL at 08:08

## 2022-08-26 RX ADMIN — PANTOPRAZOLE SODIUM 20 MG: 20 TABLET, DELAYED RELEASE ORAL at 08:08

## 2022-08-26 RX ADMIN — Medication 2000 UNITS: at 08:08

## 2022-08-26 RX ADMIN — CARBIDOPA AND LEVODOPA 1 TABLET: 25; 100 TABLET ORAL at 08:08

## 2022-08-26 RX ADMIN — CARBIDOPA AND LEVODOPA 1 TABLET: 25; 100 TABLET ORAL at 06:08

## 2022-08-26 RX ADMIN — CARBIDOPA AND LEVODOPA 1 TABLET: 25; 100 TABLET ORAL at 12:08

## 2022-08-26 RX ADMIN — LEVOTHYROXINE SODIUM 88 MCG: 0.09 TABLET ORAL at 05:08

## 2022-08-26 RX ADMIN — CARBIDOPA AND LEVODOPA 1 TABLET: 25; 100 TABLET ORAL at 04:08

## 2022-08-26 RX ADMIN — CETIRIZINE HYDROCHLORIDE 10 MG: 10 TABLET, FILM COATED ORAL at 08:08

## 2022-08-26 RX ADMIN — VENLAFAXINE HYDROCHLORIDE 75 MG: 75 CAPSULE, EXTENDED RELEASE ORAL at 08:08

## 2022-08-26 RX ADMIN — TRAMADOL HYDROCHLORIDE 50 MG: 50 TABLET, COATED ORAL at 11:08

## 2022-08-26 RX ADMIN — LINACLOTIDE 144 MCG: 72 CAPSULE, GELATIN COATED ORAL at 05:08

## 2022-08-26 RX ADMIN — NYSTATIN 500000 UNITS: 500000 SUSPENSION ORAL at 04:08

## 2022-08-26 NOTE — PT/OT/SLP PROGRESS
Physical Therapy Treatment    Patient Name:  Rosa Alejo   MRN:  [id number]    Recommendations:     Discharge Recommendations:  home with home health   Discharge Equipment Recommendations: walker, rolling, wheelchair   Barriers to discharge: None    Assessment:     Rosa Alejo is a 82 y.o. female admitted with a medical diagnosis of Hip fracture requiring operative repair.  She presents with the following impairments/functional limitations:  weakness, impaired endurance, gait instability, impaired balance, impaired cognition, decreased lower extremity function, decreased safety awareness, pain, decreased ROM.    Pt tolerated tx fair with increased time to perform all seated exercises, ambulation, and transfers with Parkinson's symptoms present with mild tremors and processing tasks asked of her to perform. Pt increased ambulation distance to 10ft using RW with parkinsonism gait pattern [place]-ModA.    Rehab Prognosis: Fair; patient would benefit from acute skilled PT services to address these deficits and reach maximum level of function.    Recent Surgery: * No surgery found *      Plan:     During this hospitalization, patient to be seen 5 x/week to address the identified rehab impairments via gait training, therapeutic activities, therapeutic exercises and progress toward the following goals:    · Plan of Care Expires:  09/22/22    Subjective     Chief Complaint: overall fatigue. Pt reports hallucinations of splattered water on floor before and during standing act.  Son reports she has these pretty often and at home before. Pt reports restless night and very tired feeling today.   Patient/Family Comments/goals: return back home with 24/7 sitters/family  Pain/Comfort:  Pain Rating 1: 4/10  Location - Side 1: Left  Location - Orientation 1: lower  Location 1: hip  Pain Addressed 1: Reposition, Distraction      Objective:     Communicated with patient and son prior to session. Patient found seated in chair with  son present    upon PT entry to room.     General Precautions: Standard, fall   Orthopedic Precautions:LLE weight bearing as tolerated   Braces:    Respiratory Status: Room air     Functional Mobility:  · Bed Mobility:     · Rolling Right: moderate assistance  · Supine to Sit: moderate assistance  · Transfers:     · Sit to Stand:  moderate assistance with rolling walker  · Gait: 10ft using RW [place]-ModA slow shuffle gait pattern with antalgic pattern towards end of ambulation  · Balance: P+      AM-PAC 6 CLICK MOBILITY  Turning over in bed (including adjusting bedclothes, sheets and blankets)?: 3  Sitting down on and standing up from a chair with arms (e.g., wheelchair, bedside commode, etc.): 2  Moving from lying on back to sitting on the side of the bed?: 2  Moving to and from a bed to a chair (including a wheelchair)?: 2  Need to walk in hospital room?: 2  Climbing 3-5 steps with a railing?: 1  Basic Mobility Total Score: 12       Therapeutic Activities and Exercises:  Seated LAQS, seated marches, seated ankle pumps, seated hip abd red tband, seated HS curls red tband, seated hip ball squeezes all 2 x 10  Chair to WC t/f [place]  WC<>toilet t/f ModA  WC to EOB [place]  Sitting to supine [place] with LLE  Scooting SBA     Patient left HOB elevated with son at bedside with call button in reach.    GOALS:   Multidisciplinary Problems     Physical Therapy Goals        Problem: Physical Therapy    Goal Priority Disciplines Outcome Goal Variances Interventions   Physical Therapy Goal     PT, PT/OT Ongoing, Progressing     Description: Goals to be met by: 2 weeks     Patient will increase functional independence with mobility by performin. Supine to sit with MInimal Assistance  2. Sit to supine with MInimal Assistance  3. Rolling to Left and Right with Minimal Assistance.  4. Sit to stand transfer with Minimal Assistance  5. Bed to chair transfer with Minimal Assistance using Rolling Walker  6. Gait  x 50 feet with  Moderate Assistance using Rolling Walker.     Goals to be met by: 4 weeks     Patient will increase functional independence with mobility by performin. Supine to sit with Stand-by Assistance  2. Sit to supine with Stand-by Assistance  3. Rolling to Left and Right with Stand-by Assistance.  4. Sit to stand transfer with Minimal Assistance  5. Bed to chair transfer with Contact Guard Assistance using Rolling Walker  6. Gait  x 150 feet with Minimal Assistance using Rolling Walker.                      Time Tracking:     PT Received On: 22  PT Start Time: 1248     PT Stop Time: 1345  PT Total Time (min): 57 min     Billable Minutes: Gait Training 10, Therapeutic Activity 35 and Therapeutic Exercise 12    Treatment Type: Treatment  PT/PTA: PTA     PTA Visit Number: 2     2022   No

## 2022-08-26 NOTE — PT/OT/SLP PROGRESS
Occupational Therapy   Treatment    Name: Rosa Alejo  MRN: [id number]  Admitting Diagnosis:  Hip fracture requiring operative repair; Fall introchanteric fx of L femur; s/p closed reduction and nailing on 8/21/22      Recommendations:     Discharge Recommendations: home with home health  Discharge Equipment Recommendations:  walker, rolling, wheelchair  Barriers to discharge:       Assessment:     Rosa Alejo is a 82 y.o. female with a medical diagnosis of Hip fracture requiring operative repair Fall introchanteric fx of L femur; s/p closed reduction and nailing on 8/21/22.  She presents with decreased balance, safety, strength and mobility. Performance deficits affecting function are weakness, impaired endurance, impaired self care skills, impaired functional mobility, impaired balance, decreased coordination, decreased safety awareness. Pt tolerated therapy fairly on today. Pt was given restbreaks due to increased fatigue. Pt was encouraged by OT to eat for nutritional intake and for better therapy outcomes. Pt noted to have decreased energy r/t poor nutritional intake. Pt's therapy progression will depend on her energy from intake.        Rehab Prognosis:  Fair; patient would benefit from acute skilled OT services to address these deficits and reach maximum level of function.       Plan:     Patient to be seen 5 x/week to address the above listed problems via self-care/home management, therapeutic activities, therapeutic exercises, neuromuscular re-education  · Plan of Care Expires:    · Plan of Care Reviewed with: patient    Subjective     Pain/Comfort:  ·  no c/o pain    Objective:     Communicated with: Pt prior to session.  Patient found supine   upon OT entry to room.    General Precautions: Standard, fall   Orthopedic Precautions:    Braces:    Respiratory Status: Room air     Occupational Performance:     Bed Mobility:    · Patient completed Rolling/Turning to Right with Min A assistance  · Patient  completed Supine to Sit with Min A assistance     Functional Mobility/Transfers:  · Patient completed Sit <> Stand Transfer with minimum assistance  with  rolling walker   · Patient completed Bed <> Chair Transfer using Step Transfer technique with minimum assistance with rolling walker  · Functional Mobility: Pt completed functional ambulation down hallway using the RW for balance and safety with Min A from OT.     Activities of Daily Living:  · LE dressing: Pt completed pants donning with Mod A to don pajamas using the reacher with educated from OT  · UE dressing: with setup EOB  ·   · Eating: Mod-Min A  To cut food due to the physical aspect of feeding. Pt requires increased time, cues and physical assistance to eat.       [facility] 6 Click ADL:      Treatment & Education:  Therapeutic exercise:  While sitting in the w/c, the pt [id number] mins on arm ergometer to increase endurance, BUE strength and activity tolerance for ADL performance    Therapeutic activity:   Pt [id number] mins on arm ergometer to increase endurance, BUE strength and activity tolerance for ADL performance      Patient left supine with all lines intact and call button in reachEducation:      GOALS:   Multidisciplinary Problems     Occupational Therapy Goals        Problem: Occupational Therapy    Goal Priority Disciplines Outcome Interventions   Occupational Therapy Goal     OT, PT/OT Ongoing, Progressing    Description: Goals to be met by: 09/24/22     Patient will increase functional independence with ADLs by performing:    UE Dressing with Modified [place].  LE Dressing with Supervision using a/e as needed  Grooming while standing at sink with Modified [place].  Toileting from toilet with Modified [place] and Supervision for hygiene and clothing management.   Standing x5-10 minutes with Supervision.  Step transfer with Modified [place] and Supervision  Toilet transfer to toilet with Modified [place] and  Supervision.                     Time Tracking:     OT Date of Treatment:    OT Start Time:  9:00  OT Stop Time:  10:15  OT Total Time (min):      Billable Minutes:Self Care/Home Management 30  Therapeutic Activity 15  Therapeutic Exercise 15     OT/GIOVANNI: OT          8/26/2022

## 2022-08-27 PROCEDURE — 25000003 PHARM REV CODE 250: Performed by: FAMILY MEDICINE

## 2022-08-27 PROCEDURE — 11000004 HC SNF PRIVATE

## 2022-08-27 PROCEDURE — 63700000 PHARM REV CODE 250 ALT 637 W/O HCPCS: Performed by: FAMILY MEDICINE

## 2022-08-27 PROCEDURE — 27000941

## 2022-08-27 PROCEDURE — 25000003 PHARM REV CODE 250: Performed by: NURSE PRACTITIONER

## 2022-08-27 RX ADMIN — NYSTATIN 500000 UNITS: 500000 SUSPENSION ORAL at 04:08

## 2022-08-27 RX ADMIN — LINACLOTIDE 144 MCG: 72 CAPSULE, GELATIN COATED ORAL at 06:08

## 2022-08-27 RX ADMIN — CETIRIZINE HYDROCHLORIDE 10 MG: 10 TABLET, FILM COATED ORAL at 08:08

## 2022-08-27 RX ADMIN — MUPIROCIN: 20 OINTMENT TOPICAL at 08:08

## 2022-08-27 RX ADMIN — NYSTATIN 500000 UNITS: 500000 SUSPENSION ORAL at 08:08

## 2022-08-27 RX ADMIN — NYSTATIN 500000 UNITS: 500000 SUSPENSION ORAL at 09:08

## 2022-08-27 RX ADMIN — TRAMADOL HYDROCHLORIDE 50 MG: 50 TABLET, COATED ORAL at 10:08

## 2022-08-27 RX ADMIN — ASPIRIN 325 MG ORAL TABLET 325 MG: 325 PILL ORAL at 08:08

## 2022-08-27 RX ADMIN — FLUCONAZOLE 200 MG: 100 TABLET ORAL at 08:08

## 2022-08-27 RX ADMIN — MUPIROCIN: 20 OINTMENT TOPICAL at 09:08

## 2022-08-27 RX ADMIN — CARBIDOPA AND LEVODOPA 1 TABLET: 25; 100 TABLET ORAL at 08:08

## 2022-08-27 RX ADMIN — CARBIDOPA AND LEVODOPA 1 TABLET: 25; 100 TABLET ORAL at 06:08

## 2022-08-27 RX ADMIN — CARBIDOPA AND LEVODOPA 1 TABLET: 25; 100 TABLET ORAL at 04:08

## 2022-08-27 RX ADMIN — QUETIAPINE FUMARATE 12.5 MG: 25 TABLET, FILM COATED ORAL at 09:08

## 2022-08-27 RX ADMIN — ONDANSETRON 4 MG: 4 TABLET, ORALLY DISINTEGRATING ORAL at 05:08

## 2022-08-27 RX ADMIN — DOCUSATE SODIUM 100 MG: 100 CAPSULE, LIQUID FILLED ORAL at 08:08

## 2022-08-27 RX ADMIN — LEVOTHYROXINE SODIUM 50 MCG: 0.05 TABLET ORAL at 06:08

## 2022-08-27 RX ADMIN — DONEPEZIL HYDROCHLORIDE 10 MG: 5 TABLET, FILM COATED ORAL at 08:08

## 2022-08-27 RX ADMIN — CARBIDOPA AND LEVODOPA 1 TABLET: 25; 100 TABLET ORAL at 12:08

## 2022-08-27 RX ADMIN — PANTOPRAZOLE SODIUM 20 MG: 20 TABLET, DELAYED RELEASE ORAL at 08:08

## 2022-08-27 RX ADMIN — VENLAFAXINE HYDROCHLORIDE 75 MG: 75 CAPSULE, EXTENDED RELEASE ORAL at 08:08

## 2022-08-27 RX ADMIN — Medication 1 TABLET: at 08:08

## 2022-08-27 RX ADMIN — NYSTATIN 500000 UNITS: 500000 SUSPENSION ORAL at 12:08

## 2022-08-27 RX ADMIN — Medication 2000 UNITS: at 08:08

## 2022-08-28 PROCEDURE — 25000003 PHARM REV CODE 250: Performed by: NURSE PRACTITIONER

## 2022-08-28 PROCEDURE — 27000941

## 2022-08-28 PROCEDURE — 11000004 HC SNF PRIVATE

## 2022-08-28 PROCEDURE — 25000003 PHARM REV CODE 250: Performed by: FAMILY MEDICINE

## 2022-08-28 RX ADMIN — MUPIROCIN: 20 OINTMENT TOPICAL at 08:08

## 2022-08-28 RX ADMIN — DONEPEZIL HYDROCHLORIDE 10 MG: 5 TABLET, FILM COATED ORAL at 08:08

## 2022-08-28 RX ADMIN — DOCUSATE SODIUM 100 MG: 100 CAPSULE, LIQUID FILLED ORAL at 08:08

## 2022-08-28 RX ADMIN — LEVOTHYROXINE SODIUM 50 MCG: 0.05 TABLET ORAL at 06:08

## 2022-08-28 RX ADMIN — NYSTATIN 500000 UNITS: 500000 SUSPENSION ORAL at 04:08

## 2022-08-28 RX ADMIN — NYSTATIN 500000 UNITS: 500000 SUSPENSION ORAL at 08:08

## 2022-08-28 RX ADMIN — NYSTATIN 500000 UNITS: 500000 SUSPENSION ORAL at 12:08

## 2022-08-28 RX ADMIN — Medication 1 TABLET: at 08:08

## 2022-08-28 RX ADMIN — QUETIAPINE FUMARATE 12.5 MG: 25 TABLET, FILM COATED ORAL at 08:08

## 2022-08-28 RX ADMIN — CARBIDOPA AND LEVODOPA 1 TABLET: 25; 100 TABLET ORAL at 06:08

## 2022-08-28 RX ADMIN — CARBIDOPA AND LEVODOPA 1 TABLET: 25; 100 TABLET ORAL at 12:08

## 2022-08-28 RX ADMIN — POLYETHYLENE GLYCOL 3350 17 G: 17 POWDER, FOR SOLUTION ORAL at 08:08

## 2022-08-28 RX ADMIN — Medication 2000 UNITS: at 08:08

## 2022-08-28 RX ADMIN — PANTOPRAZOLE SODIUM 20 MG: 20 TABLET, DELAYED RELEASE ORAL at 08:08

## 2022-08-28 RX ADMIN — LINACLOTIDE 144 MCG: 72 CAPSULE, GELATIN COATED ORAL at 06:08

## 2022-08-28 RX ADMIN — VENLAFAXINE HYDROCHLORIDE 75 MG: 75 CAPSULE, EXTENDED RELEASE ORAL at 08:08

## 2022-08-28 RX ADMIN — ASPIRIN 325 MG ORAL TABLET 325 MG: 325 PILL ORAL at 08:08

## 2022-08-28 RX ADMIN — CARBIDOPA AND LEVODOPA 1 TABLET: 25; 100 TABLET ORAL at 08:08

## 2022-08-28 RX ADMIN — CETIRIZINE HYDROCHLORIDE 10 MG: 10 TABLET, FILM COATED ORAL at 08:08

## 2022-08-28 RX ADMIN — CARBIDOPA AND LEVODOPA 1 TABLET: 25; 100 TABLET ORAL at 04:08

## 2022-08-29 LAB
ANION GAP SERPL CALCULATED.3IONS-SCNC: 7 MMOL/L (ref 7–16)
ANISOCYTOSIS BLD QL SMEAR: ABNORMAL
BASOPHILS # BLD AUTO: 0.02 K/UL (ref 0–0.2)
BASOPHILS NFR BLD AUTO: 0.3 % (ref 0–1)
BUN SERPL-MCNC: 13 MG/DL (ref 7–18)
BUN/CREAT SERPL: 20 (ref 6–20)
CALCIUM SERPL-MCNC: 7.9 MG/DL (ref 8.5–10.1)
CHLORIDE SERPL-SCNC: 102 MMOL/L (ref 98–107)
CO2 SERPL-SCNC: 35 MMOL/L (ref 21–32)
CREAT SERPL-MCNC: 0.65 MG/DL (ref 0.55–1.02)
DIFFERENTIAL METHOD BLD: ABNORMAL
EGFR (NO RACE VARIABLE) (RUSH/TITUS): 88 ML/MIN/1.73M²
EOSINOPHIL # BLD AUTO: 0.1 K/UL (ref 0–0.5)
EOSINOPHIL NFR BLD AUTO: 1.5 % (ref 1–4)
ERYTHROCYTE [DISTWIDTH] IN BLOOD BY AUTOMATED COUNT: 15.3 % (ref 11.5–14.5)
EST. AVERAGE GLUCOSE BLD GHB EST-MCNC: 100 MG/DL
GLUCOSE SERPL-MCNC: 90 MG/DL (ref 74–106)
HBA1C MFR BLD HPLC: 5.6 % (ref 4.5–6.6)
HCT VFR BLD AUTO: 26 % (ref 38–47)
HGB BLD-MCNC: 8.6 G/DL (ref 12–16)
HYPOCHROMIA BLD QL SMEAR: ABNORMAL
LYMPHOCYTES # BLD AUTO: 1.6 K/UL (ref 1–4.8)
LYMPHOCYTES NFR BLD AUTO: 24.5 % (ref 27–41)
MCH RBC QN AUTO: 32.1 PG (ref 27–31)
MCHC RBC AUTO-ENTMCNC: 33.1 G/DL (ref 32–36)
MCV RBC AUTO: 97 FL (ref 80–96)
MONOCYTES # BLD AUTO: 0.48 K/UL (ref 0–0.8)
MONOCYTES NFR BLD AUTO: 7.4 % (ref 2–6)
MPC BLD CALC-MCNC: 8.4 FL (ref 9.4–12.4)
NEUTROPHILS # BLD AUTO: 4.32 K/UL (ref 1.8–7.7)
NEUTROPHILS NFR BLD AUTO: 66.3 % (ref 53–65)
NRBC BLD MANUAL-RTO: ABNORMAL %
PLATELET # BLD AUTO: 445 K/UL (ref 150–400)
PLATELET MORPHOLOGY: ABNORMAL
POIKILOCYTOSIS BLD QL SMEAR: ABNORMAL
POTASSIUM SERPL-SCNC: 3.1 MMOL/L (ref 3.5–5.1)
RBC # BLD AUTO: 2.68 M/UL (ref 4.2–5.4)
SODIUM SERPL-SCNC: 141 MMOL/L (ref 136–145)
WBC # BLD AUTO: 6.52 K/UL (ref 4.5–11)

## 2022-08-29 PROCEDURE — 63600175 PHARM REV CODE 636 W HCPCS: Performed by: FAMILY MEDICINE

## 2022-08-29 PROCEDURE — 97535 SELF CARE MNGMENT TRAINING: CPT

## 2022-08-29 PROCEDURE — 97110 THERAPEUTIC EXERCISES: CPT | Mod: CQ

## 2022-08-29 PROCEDURE — 97530 THERAPEUTIC ACTIVITIES: CPT | Mod: CQ

## 2022-08-29 PROCEDURE — 97110 THERAPEUTIC EXERCISES: CPT

## 2022-08-29 PROCEDURE — 85025 COMPLETE CBC W/AUTO DIFF WBC: CPT | Performed by: NURSE PRACTITIONER

## 2022-08-29 PROCEDURE — 83036 HEMOGLOBIN GLYCOSYLATED A1C: CPT | Performed by: NURSE PRACTITIONER

## 2022-08-29 PROCEDURE — 25000003 PHARM REV CODE 250: Performed by: FAMILY MEDICINE

## 2022-08-29 PROCEDURE — 80048 BASIC METABOLIC PNL TOTAL CA: CPT | Performed by: NURSE PRACTITIONER

## 2022-08-29 PROCEDURE — 11000004 HC SNF PRIVATE

## 2022-08-29 PROCEDURE — 36415 COLL VENOUS BLD VENIPUNCTURE: CPT | Performed by: NURSE PRACTITIONER

## 2022-08-29 PROCEDURE — 25000003 PHARM REV CODE 250: Performed by: NURSE PRACTITIONER

## 2022-08-29 PROCEDURE — 27000941

## 2022-08-29 RX ORDER — ENOXAPARIN SODIUM 100 MG/ML
30 INJECTION SUBCUTANEOUS EVERY 24 HOURS
Status: DISCONTINUED | OUTPATIENT
Start: 2022-08-29 | End: 2022-09-16 | Stop reason: HOSPADM

## 2022-08-29 RX ORDER — MAG HYDROX/ALUMINUM HYD/SIMETH 200-200-20
30 SUSPENSION, ORAL (FINAL DOSE FORM) ORAL
Status: DISCONTINUED | OUTPATIENT
Start: 2022-08-29 | End: 2022-09-16 | Stop reason: HOSPADM

## 2022-08-29 RX ADMIN — CARBIDOPA AND LEVODOPA 1 TABLET: 25; 100 TABLET ORAL at 06:08

## 2022-08-29 RX ADMIN — LEVOTHYROXINE SODIUM 88 MCG: 0.09 TABLET ORAL at 05:08

## 2022-08-29 RX ADMIN — NYSTATIN 500000 UNITS: 500000 SUSPENSION ORAL at 04:08

## 2022-08-29 RX ADMIN — CARBIDOPA AND LEVODOPA 1 TABLET: 25; 100 TABLET ORAL at 04:08

## 2022-08-29 RX ADMIN — NYSTATIN 500000 UNITS: 500000 SUSPENSION ORAL at 08:08

## 2022-08-29 RX ADMIN — PANTOPRAZOLE SODIUM 20 MG: 20 TABLET, DELAYED RELEASE ORAL at 08:08

## 2022-08-29 RX ADMIN — Medication 2000 UNITS: at 08:08

## 2022-08-29 RX ADMIN — CARBIDOPA AND LEVODOPA 1 TABLET: 25; 100 TABLET ORAL at 12:08

## 2022-08-29 RX ADMIN — VENLAFAXINE HYDROCHLORIDE 75 MG: 75 CAPSULE, EXTENDED RELEASE ORAL at 08:08

## 2022-08-29 RX ADMIN — Medication 1 TABLET: at 08:08

## 2022-08-29 RX ADMIN — POTASSIUM CHLORIDE 20 MEQ: 750 TABLET, EXTENDED RELEASE ORAL at 08:08

## 2022-08-29 RX ADMIN — CARBIDOPA AND LEVODOPA 1 TABLET: 25; 100 TABLET ORAL at 08:08

## 2022-08-29 RX ADMIN — ENOXAPARIN SODIUM 30 MG: 100 INJECTION SUBCUTANEOUS at 04:08

## 2022-08-29 RX ADMIN — ASPIRIN 325 MG ORAL TABLET 325 MG: 325 PILL ORAL at 08:08

## 2022-08-29 RX ADMIN — DONEPEZIL HYDROCHLORIDE 10 MG: 5 TABLET, FILM COATED ORAL at 08:08

## 2022-08-29 RX ADMIN — LINACLOTIDE 144 MCG: 72 CAPSULE, GELATIN COATED ORAL at 05:08

## 2022-08-29 RX ADMIN — QUETIAPINE FUMARATE 12.5 MG: 25 TABLET, FILM COATED ORAL at 08:08

## 2022-08-29 RX ADMIN — Medication 6 MG: at 12:08

## 2022-08-29 RX ADMIN — ALUMINUM HYDROXIDE, MAGNESIUM HYDROXIDE, AND SIMETHICONE 30 ML: 200; 200; 20 SUSPENSION ORAL at 04:08

## 2022-08-29 RX ADMIN — TRAMADOL HYDROCHLORIDE 50 MG: 50 TABLET, COATED ORAL at 07:08

## 2022-08-29 RX ADMIN — DOCUSATE SODIUM 100 MG: 100 CAPSULE, LIQUID FILLED ORAL at 08:08

## 2022-08-29 RX ADMIN — NYSTATIN 500000 UNITS: 500000 SUSPENSION ORAL at 12:08

## 2022-08-29 RX ADMIN — ALUMINUM HYDROXIDE, MAGNESIUM HYDROXIDE, AND SIMETHICONE 30 ML: 200; 200; 20 SUSPENSION ORAL at 08:08

## 2022-08-29 RX ADMIN — CETIRIZINE HYDROCHLORIDE 10 MG: 10 TABLET, FILM COATED ORAL at 08:08

## 2022-08-29 NOTE — PT/OT/SLP PROGRESS
Occupational Therapy   Treatment    Name: Rosa Alejo  MRN: [id number]  Admitting Diagnosis:  Hip fracture requiring operative repair; Fall introchanteric fx of L femur; s/p closed reduction and nailing on 8/21/22      Recommendations:     Discharge Recommendations: home with home health  Discharge Equipment Recommendations:  walker, rolling, wheelchair  Barriers to discharge:       Assessment:     Rosa Alejo is a 82 y.o. female with a medical diagnosis of Hip fracture requiring operative repair Fall introchanteric fx of L femur; s/p closed reduction and nailing on 8/21/22.  She presents with decreased balance, safety, strength and mobility. Performance deficits affecting function are weakness, impaired endurance, impaired self care skills, impaired functional mobility, impaired balance, decreased coordination, decreased safety awareness. Pt tolerated OT well on today. Pt was given restbreaks as needed. Pt was completed functional ambulation down the hallway using the RW. OT mentioned and encouraged other members of the healthcare team to promote nutrition to the patient.      Rehab Prognosis:  Fair; patient would benefit from acute skilled OT services to address these deficits and reach maximum level of function.       Plan:     Patient to be seen 5 x/week to address the above listed problems via self-care/home management, therapeutic activities, therapeutic exercises, neuromuscular re-education  Plan of Care Expires:    Plan of Care Reviewed with: patient    Subjective     Pain/Comfort:   no c/o pain    Objective:     Communicated with: Pt prior to session.  Patient found supine   upon OT entry to room.    General Precautions: Standard, fall   Orthopedic Precautions:    Braces:    Respiratory Status: Room air     Occupational Performance:     Bed Mobility:    Supine to sit t/f with Min A for management of LLE    Functional Mobility/Transfers:  Patient was t/f to and from the therapy gym in the w/c by OT  however she completed functional ambulation down the hallway using the RW for balance and safety. Pt was able to walk further with better coordination on today.       Activities of Daily Living:  T/f assessed at CGA with sit to stand from edge of bed to the RW  for safety and balance      [facility] 6 Click ADL:      Treatment & Education:  Therapeutic exercise:  While sitting in the w/c, the pt [id number] mins on arm ergometer to increase endurance, BUE strength and activity tolerance for ADL performance    While sitting in the w/c, the pt completed tricep extension 2 sets of 20 reps with red theraband; bicep curls 2 sets of 20 reps with 4# DB to increase BUE strength for ADLs and IADLS.       Patient left supine with all lines intact and call button in reach    GOALS:   Multidisciplinary Problems       Occupational Therapy Goals          Problem: Occupational Therapy    Goal Priority Disciplines Outcome Interventions   Occupational Therapy Goal     OT, PT/OT Ongoing, Progressing    Description: Goals to be met by: 09/24/22     Patient will increase functional independence with ADLs by performing:    UE Dressing with Modified [place].  LE Dressing with Supervision using a/e as needed  Grooming while standing at sink with Modified [place].  Toileting from toilet with Modified [place] and Supervision for hygiene and clothing management.   Standing x5-10 minutes with Supervision.  Step transfer with Modified [place] and Supervision  Toilet transfer to toilet with Modified [place] and Supervision.                         Time Tracking:     OT Date of Treatment:    OT Start Time:  9:40  OT Stop Time:  10:20  OT Total Time (min):      Billable Minutes:Self Care/Home Management 15  Therapeutic Exercise 25     OT/GIOVANNI: OT          8/29/2022

## 2022-08-29 NOTE — PLAN OF CARE
Pt. Up working with Therapy today but does become dizzy at times with change in position. Needs to take rest breaks. Pt. Is weight bearing as tolerated to her Lt. Leg with a RW. Family has been coming to stay with pt. And provide good support overall. Will cont. To follow.

## 2022-08-29 NOTE — PT/OT/SLP PROGRESS
Physical Therapy Treatment    Patient Name:  Rosa Alejo   MRN:  [id number]    Recommendations:     Discharge Recommendations:  home with home health   Discharge Equipment Recommendations: walker, rolling, wheelchair   Barriers to discharge: None    Assessment:     Rosa Alejo is a 82 y.o. female admitted with a medical diagnosis of Left Hip fracture requiring operative repair.  She presents with the following impairments/functional limitations:  weakness, impaired endurance, gait instability, impaired balance, decreased lower extremity function, decreased upper extremity function, decreased safety awareness, decreased ROM, orthopedic precautions.    Pt tolerated tx fair with increased time to perform all seated exercises and transfers with Parkinson's symptoms present with mild tremors and processing tasks asked of her to perform. Pt reports she didn't feel well and wanted to go back to bed. PTA performed Total assist with WC to bed t/f due to pt becoming dizzy and unable to follow vc using RW making t/f using RW unsafe.     Rehab Prognosis: Fair; patient would benefit from acute skilled PT services to address these deficits and reach maximum level of function.    Recent Surgery: * No surgery found *      Plan:     During this hospitalization, patient to be seen 5 x/week to address the identified rehab impairments via therapeutic activities, therapeutic exercises and progress toward the following goals:    Plan of Care Expires:  09/22/22    Subjective     Chief Complaint: Overall fatigue. Stomach soreness. Pt still reporting hallucinations at times with mild tremors per observation.   Patient/Family Comments/goals: return back home with 24/7 sitters/family  Pain/Comfort:  Pain Rating 1: 0/10 (muscle spasms)  Location - Side 1: Left  Location - Orientation 1: lower  Location 1: hip  Pain Addressed 1: Reposition, Distraction      Objective:     Communicated with patient prior to session. Patient found seated in  chair with  B GRISEL hose upon PT entry to room.     General Precautions: Standard, fall   Orthopedic Precautions:LLE weight bearing as tolerated   Braces:    Respiratory Status: Room air     Functional Mobility:  Bed Mobility:     Rolling Right: moderate assistance  Supine to Sit: moderate assistance  Sit to Supine: moderate assistance  Scooting: MaxA due to dizziness  Transfers:     Sit to Stand:  moderate assistance with rolling walker  Gait: unable today due to severe fatigue/dizziness with standing act.  Balance: P+      AM-PAC 6 CLICK MOBILITY  Turning over in bed (including adjusting bedclothes, sheets and blankets)?: 2  Sitting down on and standing up from a chair with arms (e.g., wheelchair, bedside commode, etc.): 2  Moving from lying on back to sitting on the side of the bed?: 2  Moving to and from a bed to a chair (including a wheelchair)?: 2  Need to walk in hospital room?: 2  Climbing 3-5 steps with a railing?: 1  Basic Mobility Total Score: 11       Therapeutic Activities and Exercises: to improve BLE strength, endurance, ROM, improve sitting and standing tolerance unsupported/supported   Nustep x8min  Ball squeezes x 20  Seated LAQ and marches 2 x 10  Attempted Sit to stand x2 from WC to bed using RW [place] but un able to complete due to dizziness  Total lift t/f from WC to EOB with PTA  Bed mobs: sitting to Supine ModA, scooting up in bed MaxA x 2      Patient left HOB elevated, B heels floating using pillows with caregiver at bedside with call button in reach.    GOALS:   Multidisciplinary Problems       Physical Therapy Goals          Problem: Physical Therapy    Goal Priority Disciplines Outcome Goal Variances Interventions   Physical Therapy Goal     PT, PT/OT Ongoing, Progressing     Description: Goals to be met by: 2 weeks     Patient will increase functional independence with mobility by performin. Supine to sit with MInimal Assistance  2. Sit to supine with MInimal Assistance  3. Rolling  to Left and Right with Minimal Assistance.  4. Sit to stand transfer with Minimal Assistance  5. Bed to chair transfer with Minimal Assistance using Rolling Walker  6. Gait  x 50 feet with Moderate Assistance using Rolling Walker.     Goals to be met by: 4 weeks     Patient will increase functional independence with mobility by performin. Supine to sit with Stand-by Assistance  2. Sit to supine with Stand-by Assistance  3. Rolling to Left and Right with Stand-by Assistance.  4. Sit to stand transfer with Minimal Assistance  5. Bed to chair transfer with Contact Guard Assistance using Rolling Walker  6. Gait  x 150 feet with Minimal Assistance using Rolling Walker.                          Time Tracking:     PT Received On: 22  PT Start Time: 1010     PT Stop Time: 1050  PT Total Time (min): 40 min     Billable Minutes: Therapeutic Activity 25 and Therapeutic Exercise 15    Treatment Type: Treatment  PT/PTA: PTA     PTA Visit Number: 3     2022

## 2022-08-30 PROCEDURE — 27000941

## 2022-08-30 PROCEDURE — 99499 NO LOS: ICD-10-PCS | Mod: ,,, | Performed by: FAMILY MEDICINE

## 2022-08-30 PROCEDURE — 97110 THERAPEUTIC EXERCISES: CPT | Mod: CQ

## 2022-08-30 PROCEDURE — 97530 THERAPEUTIC ACTIVITIES: CPT

## 2022-08-30 PROCEDURE — 99499 UNLISTED E&M SERVICE: CPT | Mod: ,,, | Performed by: FAMILY MEDICINE

## 2022-08-30 PROCEDURE — 97110 THERAPEUTIC EXERCISES: CPT

## 2022-08-30 PROCEDURE — 25000003 PHARM REV CODE 250: Performed by: FAMILY MEDICINE

## 2022-08-30 PROCEDURE — 25000003 PHARM REV CODE 250: Performed by: NURSE PRACTITIONER

## 2022-08-30 PROCEDURE — 63600175 PHARM REV CODE 636 W HCPCS: Performed by: FAMILY MEDICINE

## 2022-08-30 PROCEDURE — 97535 SELF CARE MNGMENT TRAINING: CPT

## 2022-08-30 PROCEDURE — 11000004 HC SNF PRIVATE

## 2022-08-30 RX ORDER — CALCIUM CARBONATE 200(500)MG
500 TABLET,CHEWABLE ORAL 3 TIMES DAILY PRN
Status: DISCONTINUED | OUTPATIENT
Start: 2022-08-30 | End: 2022-09-16 | Stop reason: HOSPADM

## 2022-08-30 RX ADMIN — ALUMINUM HYDROXIDE, MAGNESIUM HYDROXIDE, AND SIMETHICONE 30 ML: 200; 200; 20 SUSPENSION ORAL at 08:08

## 2022-08-30 RX ADMIN — NYSTATIN 500000 UNITS: 500000 SUSPENSION ORAL at 04:08

## 2022-08-30 RX ADMIN — ASPIRIN 325 MG ORAL TABLET 325 MG: 325 PILL ORAL at 09:08

## 2022-08-30 RX ADMIN — POTASSIUM CHLORIDE 20 MEQ: 750 TABLET, EXTENDED RELEASE ORAL at 08:08

## 2022-08-30 RX ADMIN — CETIRIZINE HYDROCHLORIDE 10 MG: 10 TABLET, FILM COATED ORAL at 09:08

## 2022-08-30 RX ADMIN — Medication 1 TABLET: at 09:08

## 2022-08-30 RX ADMIN — DONEPEZIL HYDROCHLORIDE 10 MG: 5 TABLET, FILM COATED ORAL at 09:08

## 2022-08-30 RX ADMIN — NYSTATIN 500000 UNITS: 500000 SUSPENSION ORAL at 08:08

## 2022-08-30 RX ADMIN — ENOXAPARIN SODIUM 30 MG: 100 INJECTION SUBCUTANEOUS at 04:08

## 2022-08-30 RX ADMIN — POTASSIUM CHLORIDE 20 MEQ: 750 TABLET, EXTENDED RELEASE ORAL at 09:08

## 2022-08-30 RX ADMIN — CARBIDOPA AND LEVODOPA 1 TABLET: 25; 100 TABLET ORAL at 06:08

## 2022-08-30 RX ADMIN — ALUMINUM HYDROXIDE, MAGNESIUM HYDROXIDE, AND SIMETHICONE 30 ML: 200; 200; 20 SUSPENSION ORAL at 05:08

## 2022-08-30 RX ADMIN — QUETIAPINE FUMARATE 12.5 MG: 25 TABLET, FILM COATED ORAL at 08:08

## 2022-08-30 RX ADMIN — CARBIDOPA AND LEVODOPA 1 TABLET: 25; 100 TABLET ORAL at 03:08

## 2022-08-30 RX ADMIN — PANTOPRAZOLE SODIUM 20 MG: 20 TABLET, DELAYED RELEASE ORAL at 09:08

## 2022-08-30 RX ADMIN — CARBIDOPA AND LEVODOPA 1 TABLET: 25; 100 TABLET ORAL at 11:08

## 2022-08-30 RX ADMIN — Medication 2000 UNITS: at 09:08

## 2022-08-30 RX ADMIN — NYSTATIN 500000 UNITS: 500000 SUSPENSION ORAL at 09:08

## 2022-08-30 RX ADMIN — DOCUSATE SODIUM 100 MG: 100 CAPSULE, LIQUID FILLED ORAL at 09:08

## 2022-08-30 RX ADMIN — VENLAFAXINE HYDROCHLORIDE 75 MG: 75 CAPSULE, EXTENDED RELEASE ORAL at 09:08

## 2022-08-30 RX ADMIN — NYSTATIN 500000 UNITS: 500000 SUSPENSION ORAL at 01:08

## 2022-08-30 RX ADMIN — LINACLOTIDE 144 MCG: 72 CAPSULE, GELATIN COATED ORAL at 05:08

## 2022-08-30 RX ADMIN — TRAMADOL HYDROCHLORIDE 50 MG: 50 TABLET, COATED ORAL at 08:08

## 2022-08-30 RX ADMIN — ALUMINUM HYDROXIDE, MAGNESIUM HYDROXIDE, AND SIMETHICONE 30 ML: 200; 200; 20 SUSPENSION ORAL at 04:08

## 2022-08-30 RX ADMIN — LEVOTHYROXINE SODIUM 88 MCG: 0.09 TABLET ORAL at 05:08

## 2022-08-30 RX ADMIN — ALUMINUM HYDROXIDE, MAGNESIUM HYDROXIDE, AND SIMETHICONE 30 ML: 200; 200; 20 SUSPENSION ORAL at 11:08

## 2022-08-30 RX ADMIN — CARBIDOPA AND LEVODOPA 1 TABLET: 25; 100 TABLET ORAL at 09:08

## 2022-08-30 NOTE — PROGRESS NOTES
[facility] - Medical Surgical Unit  Adult Nutrition  Consult Note    SUMMARY     Recommendations    Recommendation/Intervention: 1. Magic cup with L and D tray 2. Ensure Enlive one carton po BID 3. MVM daily  Goals: Meet EEN >75%  Nutrition Goal Status: new    Assessment and Plan    No new Assessment & Plan notes have been filed under this hospital service since the last note was generated.  Service: Nutrition       Malnutrition Assessment  Malnutrition Type: chronic illness  Hair/Scalp (Micronutrient): depigmented, dull           Orbital Region (Subcutaneous Fat Loss): severe depletion  Upper Arm Region (Subcutaneous Fat Loss): severe depletion  Thoracic and Lumbar Region: severe depletion   Mandaeism Region (Muscle Loss): severe depletion  Clavicle Bone Region (Muscle Loss): severe depletion            Severe Weight Loss (Malnutrition):  (79% IBW)    Reason for Assessment    Reason For Assessment: consult (swing bed patient)  Diagnosis:  (Left hip fx with repair, Parkinson's)  Relevant Medical History: Parkinson's dementia, Thyroid disease, HTN ,Hypothyroidism  Interdisciplinary Rounds: did not attend  General Information Comments: RDN visited pt today and talked with pt and her caregiver Sparkle.  Pt also discussed pt with OT.  She tires easily with self feeding.  Her care giver stated that this was not as issue before her acute illess and that she assisted with meal setup.  RDN reviewed POC.  Caregiver denied need to modify texture of diet.  her UBW: ~95-98# so her wt is better but she is wasted.    Nutrition Risk Screen    Nutrition Risk Screen:  (fall with fx, Adv Parkinson's)    Nutrition/Diet History    Food Preferences: none stated  Spiritual, Cultural Beliefs, Gnosticist Practices, Values that Affect Care: no  Food Allergies: NKFA  Factors Affecting Nutritional Intake: decreased appetite, inability to feed self, impaired cognitive status/motor control (tires easily with self  "feeding)    Anthropometrics    Temp: 98.6 °F (37 °C)  Height: 5' 7" (170.2 cm)  Height (inches): 67 in  Weight Method: Bed Scale  Weight: 46.7 kg (103 lb)  Weight (lb): 103 lb  Ideal Body Weight (IBW), Female: 135 lb  % Ideal Body Weight, Female (lb): 76.3 %  BMI (Calculated): 16.1  BMI Grade: 16 - 16.9 protein-energy malnutrition grade II  Usual Body Weight (UBW), k kg  % Usual Body Weight: 104.04  % Weight Change From Usual Weight: 3.82 %       Lab/Procedures/Meds    Pertinent Labs Reviewed: reviewed  Pertinent Labs Comments: K 3.1  Pertinent Medications Reviewed: reviewed  Pertinent Medications Comments: Vit D, Seroquel, Ca+D, Sinemet, Synthroid, Linzess, Nystatin SS, Protonix    Physical Findings/Assessment         Estimated/Assessed Needs    Weight Used For Calorie Calculations: 48.5 kg (107 lb)  Energy Calorie Requirements (kcal): 5745-6237  Energy Need Method: Kcal/kg  Protein Requirements: 49-58  Weight Used For Protein Calculations: 48.5 kg (107 lb)     Estimated Fluid Requirement Method: RDA Method  RDA Method (mL): 1456         Nutrition Prescription Ordered    Current Diet Order: Regular    Evaluation of Received Nutrient/Fluid Intake    Energy Calories Required: not meeting needs  Protein Required: not meeting needs  Fluid Required: not meeting needs  Tolerance: tolerating  % Intake of Estimated Energy Needs: 0 - 25 %  % Meal Intake: 0 - 25 %    Nutrition Risk    Level of Risk/Frequency of Follow-up: high       Monitor and Evaluation    Food and Nutrient Intake: food and beverage intake  Biochemical Data, Medical Tests and Procedures: electrolyte and renal panel  Nutrition-Focused Physical Findings: overall appearance       Nutrition Follow-Up    RD Follow-up?: Yes  "

## 2022-08-30 NOTE — PLAN OF CARE
[facility] - Medical Surgical Unit - Swing Bed   Interdisciplinary Team Meeting    Patient: Rosa Alejo   Today's Date: 8/30/2022   Estimated D/C Date: 9/14/22        Physician:Pérez Skinner D.O. Nurse Practitioner:    [facility]armacy:[facility] Director: MONICA Ray   : Trice Paredes RN Physical/Occupational Therapy: Ainsley Marin OT   Speech Therapy: ST Corazon    Nursing: Neema Velazquez RN  Respiratory: Annita Ceron, Resp. Dietary: Yariel Morales, Samir   Other:      Nurse  New Symptoms/Problems:   Last BM: 8/25/22 Urine: incontinent Diarrhea: No   Constipated: No Bowel: incontinent Peñaloza: No   Isolation: No D/C Date:  Date:    O2 Device: Room Air O2 Flow:   SpO2: 97 %   Nutrition: Regular  Speech/Swallowing: No current speech or swallowing issues  Aspiration Precautions: No  Cognition: Memory issues    Physical Therapy  Physical Therapy/Gait: sit to stand at EOB ELOS: Plan to DC     Transfers: Contact Guard Assistance Range of Motion/Restrictions: WBAT to LLE     Occupational Therapy  Eating/Grooming: Supervision or Set-up Assistance Toileting: Minimal Assistance to CGA   Bathing: Minimal Assistance to CGA Dressing (Upper Body): Contact Guard Assistance   Dressing (Lower Body): Minimal Assistance        Tx Plan/Recommendations reviewed with family and/or patient on (date) 8/30/22.  Additional family Conference/Training:   D/C Plan/Recommendations: Home with HH  BRODERICK: 9/14/22    Pharmacy  Medication Changes (see MD orders in chart): No  MD:Pérez Skinner D.O. Labs Reviewed: Yes New Lab Orders: No   MD/NP Signature:

## 2022-08-30 NOTE — PLAN OF CARE
Problem: Adult Inpatient Plan of Care  Goal: Absence of Hospital-Acquired Illness or Injury  Intervention: Identify and Manage Fall Risk  Flowsheets (Taken 8/30/2022 0325)  Safety Promotion/Fall Prevention:   assistive device/personal item within reach   family to remain at bedside   bed alarm set   side rails raised x 3   instructed to call staff for mobility  Intervention: Prevent Skin Injury  Flowsheets (Taken 8/30/2022 0325)  Body Position: position maintained  Skin Protection: incontinence pads utilized  Intervention: Prevent and Manage VTE (Venous Thromboembolism) Risk  Flowsheets (Taken 8/30/2022 0325)  Activity Management: Rolling - L1  VTE Prevention/Management: fluids promoted  Intervention: Prevent Infection  Flowsheets (Taken 8/30/2022 0325)  Infection Prevention:   equipment surfaces disinfected   hand hygiene promoted

## 2022-08-30 NOTE — PT/OT/SLP PROGRESS
Physical Therapy Treatment    Patient Name:  Rosa Alejo   MRN:  [id number]    Recommendations:     Discharge Recommendations:  home with home health   Discharge Equipment Recommendations: walker, rolling, wheelchair   Barriers to discharge: None    Assessment:     Rosa Alejo is a 82 y.o. female admitted with a medical diagnosis of Left Hip fracture requiring operative repair.  She presents with the following impairments/functional limitations:  weakness, impaired endurance, gait instability, impaired balance, decreased lower extremity function, decreased safety awareness, decreased ROM, impaired coordination.    Pt tolerated tx fair with increased time to perform all seated exercises with [place] with LLE with marching.     Rehab Prognosis: Fair; patient would benefit from acute skilled PT services to address these deficits and reach maximum level of function.    Recent Surgery: * No surgery found *      Plan:     During this hospitalization, patient to be seen 5 x/week to address the identified rehab impairments via therapeutic exercises and progress toward the following goals:    Plan of Care Expires:  09/22/22    Subjective     Chief Complaint: Overall fatigue. Pt was finally agreeable to participate with PT after 2 previous attempts this afternoon.   Patient/Family Comments/goals: return back home with 24/7 sitters/family  Pain/Comfort:  Pain Rating 1: 0/10  Location - Side 1: Left  Location - Orientation 1: lower  Location 1: hip      Objective:     Communicated with patient and caregiver prior to session. Patient found reclined in chair with  B GRISEL hose donned upon PT entry to room.     General Precautions: Standard, fall   Orthopedic Precautions:LLE weight bearing as tolerated   Braces:    Respiratory Status: Room air     Functional Mobility:      AM-PAC 6 CLICK MOBILITY          Therapeutic Activities and Exercises: to improve BLE strength, endurance, ROM, improve sitting and standing tolerance  unsupported/supported   Ball squeezes x 20  Seated LAQ and marches 2 x 10--[place] with LLE marching only   Long sitting QS 2 x 10   Ankle pumps 2 x 10     Patient left reclined in chair, B heels floating using pillows with caregiver present with call button in reach.    GOALS:   Multidisciplinary Problems       Physical Therapy Goals          Problem: Physical Therapy    Goal Priority Disciplines Outcome Goal Variances Interventions   Physical Therapy Goal     PT, PT/OT Ongoing, Progressing     Description: Goals to be met by: 2 weeks     Patient will increase functional independence with mobility by performin. Supine to sit with MInimal Assistance  2. Sit to supine with MInimal Assistance  3. Rolling to Left and Right with Minimal Assistance.  4. Sit to stand transfer with Minimal Assistance  5. Bed to chair transfer with Minimal Assistance using Rolling Walker  6. Gait  x 50 feet with Moderate Assistance using Rolling Walker.     Goals to be met by: 4 weeks     Patient will increase functional independence with mobility by performin. Supine to sit with Stand-by Assistance  2. Sit to supine with Stand-by Assistance  3. Rolling to Left and Right with Stand-by Assistance.  4. Sit to stand transfer with Minimal Assistance  5. Bed to chair transfer with Contact Guard Assistance using Rolling Walker  6. Gait  x 150 feet with Minimal Assistance using Rolling Walker.                          Time Tracking:     PT Received On: 22  PT Start Time: 1500     PT Stop Time: 1523  PT Total Time (min): 23 min     Billable Minutes: Therapeutic Exercise 23    Treatment Type: Treatment  PT/PTA: PTA     PTA Visit Number: 4     2022

## 2022-08-30 NOTE — PT/OT/SLP PROGRESS
Occupational Therapy   Treatment    Name: Rosa Alejo  MRN: [id number]  Admitting Diagnosis:  Hip fracture requiring operative repair; Fall introchanteric fx of L femur; s/p closed reduction and nailing on 8/21/22      Recommendations:     Discharge Recommendations: home with home health  Discharge Equipment Recommendations:  walker, rolling, wheelchair  Barriers to discharge:       Assessment:     Rosa Alejo is a 82 y.o. female with a medical diagnosis of Hip fracture requiring operative repair Fall introchanteric fx of L femur; s/p closed reduction and nailing on 8/21/22.  She presents with decreased balance, safety, strength and mobility. Performance deficits affecting function are weakness, impaired endurance, impaired self care skills, impaired functional mobility, impaired balance, decreased coordination, decreased safety awareness. Pt tolerated skilled OT well on today but was given longer therapeutic rest breaks as needed. . Pt was given restbreaks as needed. Pt completed functional ambulation to the restroom using the RW for balance and safety. OT mentioned and encouraged other members of the healthcare team to promote nutrition to the patient.      Rehab Prognosis:  Fair; patient would benefit from acute skilled OT services to address these deficits and reach maximum level of function.       Plan:     Patient to be seen 5 x/week to address the above listed problems via self-care/home management, therapeutic activities, therapeutic exercises, neuromuscular re-education  Plan of Care Expires:    Plan of Care Reviewed with: patient    Subjective     Pain/Comfort:  5/10    Objective:     Communicated with: Pt prior to session.  Patient found supine   upon OT entry to room.    General Precautions: Standard, fall   Orthopedic Precautions:    Braces:    Respiratory Status: Room air     Occupational Performance:     Bed Mobility:    Supine to sit t/f with Min A using bed rails and OT assistance for  management of LLE    Functional Mobility/Transfers:  Patient was t/f to and from the therapy gym in the w/c by OT however she completed functional ambulation down to the restroom the RW for balance and safety.     Activities of Daily Living:  T/f assessed at CGA with sit to stand from edge of bed to the RW  for safety and balance  Toilet t/f and toileting: Min A-CGA for balance and safety.      [facility] 6 Click ADL:      Treatment & Education:  Therapeutic activity:  While sitting in the w/c, the pt [id number] mins on arm ergometer to increase endurance, BUE strength and activity tolerance for ADL performance    Therapeutic exercise:    While sitting in the w/c, the pt completed tricep extension 2 sets of 20 reps with red theraband; bicep curls 2 sets of 20 reps with 5# DB to increase BUE strength for ADLs and IADLS.       Patient left supine with all lines intact and call button in reach    GOALS:   Multidisciplinary Problems       Occupational Therapy Goals          Problem: Occupational Therapy    Goal Priority Disciplines Outcome Interventions   Occupational Therapy Goal     OT, PT/OT Ongoing, Progressing    Description: Goals to be met by: 09/24/22     Patient will increase functional independence with ADLs by performing:    UE Dressing with Modified [place].  LE Dressing with Supervision using a/e as needed  Grooming while standing at sink with Modified [place].  Toileting from toilet with Modified [place] and Supervision for hygiene and clothing management.   Standing x5-10 minutes with Supervision.  Step transfer with Modified [place] and Supervision  Toilet transfer to toilet with Modified [place] and Supervision.                         Time Tracking:     OT Date of Treatment:    OT Start Time:  9:35  OT Stop Time:  10:35  OT Total Time (min):      Billable Minutes:Self Care/Home Management 25  Therapeutic Exercise 15   Therapeutic Activity:10    OT/GIOVANNI: OT          8/30/2022

## 2022-08-30 NOTE — PROGRESS NOTES
Discussed 8- during IDT, the patient was not on DVT medication prevention.   Enoxaparin was initiated.   Thank you

## 2022-08-31 PROCEDURE — 97530 THERAPEUTIC ACTIVITIES: CPT | Mod: CQ

## 2022-08-31 PROCEDURE — 97110 THERAPEUTIC EXERCISES: CPT | Mod: CQ

## 2022-08-31 PROCEDURE — 11000004 HC SNF PRIVATE

## 2022-08-31 PROCEDURE — 97110 THERAPEUTIC EXERCISES: CPT

## 2022-08-31 PROCEDURE — 25000003 PHARM REV CODE 250: Performed by: NURSE PRACTITIONER

## 2022-08-31 PROCEDURE — 97530 THERAPEUTIC ACTIVITIES: CPT

## 2022-08-31 PROCEDURE — 97116 GAIT TRAINING THERAPY: CPT | Mod: CQ

## 2022-08-31 PROCEDURE — 97535 SELF CARE MNGMENT TRAINING: CPT

## 2022-08-31 PROCEDURE — 25000003 PHARM REV CODE 250: Performed by: FAMILY MEDICINE

## 2022-08-31 PROCEDURE — 63600175 PHARM REV CODE 636 W HCPCS: Performed by: FAMILY MEDICINE

## 2022-08-31 PROCEDURE — 27000941

## 2022-08-31 RX ADMIN — CARBIDOPA AND LEVODOPA 1 TABLET: 25; 100 TABLET ORAL at 06:08

## 2022-08-31 RX ADMIN — CARBIDOPA AND LEVODOPA 1 TABLET: 25; 100 TABLET ORAL at 08:08

## 2022-08-31 RX ADMIN — QUETIAPINE FUMARATE 12.5 MG: 25 TABLET, FILM COATED ORAL at 08:08

## 2022-08-31 RX ADMIN — DOCUSATE SODIUM 100 MG: 100 CAPSULE, LIQUID FILLED ORAL at 09:08

## 2022-08-31 RX ADMIN — CETIRIZINE HYDROCHLORIDE 10 MG: 10 TABLET, FILM COATED ORAL at 09:08

## 2022-08-31 RX ADMIN — PANTOPRAZOLE SODIUM 20 MG: 20 TABLET, DELAYED RELEASE ORAL at 09:08

## 2022-08-31 RX ADMIN — NYSTATIN 500000 UNITS: 500000 SUSPENSION ORAL at 05:08

## 2022-08-31 RX ADMIN — CARBIDOPA AND LEVODOPA 1 TABLET: 25; 100 TABLET ORAL at 05:08

## 2022-08-31 RX ADMIN — NYSTATIN 500000 UNITS: 500000 SUSPENSION ORAL at 08:08

## 2022-08-31 RX ADMIN — POTASSIUM CHLORIDE 20 MEQ: 750 TABLET, EXTENDED RELEASE ORAL at 09:08

## 2022-08-31 RX ADMIN — LINACLOTIDE 145 MCG: 145 CAPSULE, GELATIN COATED ORAL at 05:08

## 2022-08-31 RX ADMIN — ALUMINUM HYDROXIDE, MAGNESIUM HYDROXIDE, AND SIMETHICONE 30 ML: 200; 200; 20 SUSPENSION ORAL at 08:08

## 2022-08-31 RX ADMIN — DONEPEZIL HYDROCHLORIDE 10 MG: 5 TABLET, FILM COATED ORAL at 09:08

## 2022-08-31 RX ADMIN — ENOXAPARIN SODIUM 30 MG: 100 INJECTION SUBCUTANEOUS at 05:08

## 2022-08-31 RX ADMIN — ALUMINUM HYDROXIDE, MAGNESIUM HYDROXIDE, AND SIMETHICONE 30 ML: 200; 200; 20 SUSPENSION ORAL at 05:08

## 2022-08-31 RX ADMIN — ASPIRIN 325 MG ORAL TABLET 325 MG: 325 PILL ORAL at 09:08

## 2022-08-31 RX ADMIN — Medication 2000 UNITS: at 09:08

## 2022-08-31 RX ADMIN — NYSTATIN 500000 UNITS: 500000 SUSPENSION ORAL at 01:08

## 2022-08-31 RX ADMIN — NYSTATIN 500000 UNITS: 500000 SUSPENSION ORAL at 09:08

## 2022-08-31 RX ADMIN — CARBIDOPA AND LEVODOPA 1 TABLET: 25; 100 TABLET ORAL at 01:08

## 2022-08-31 RX ADMIN — ALUMINUM HYDROXIDE, MAGNESIUM HYDROXIDE, AND SIMETHICONE 30 ML: 200; 200; 20 SUSPENSION ORAL at 01:08

## 2022-08-31 RX ADMIN — VENLAFAXINE HYDROCHLORIDE 75 MG: 75 CAPSULE, EXTENDED RELEASE ORAL at 09:08

## 2022-08-31 RX ADMIN — LEVOTHYROXINE SODIUM 88 MCG: 0.09 TABLET ORAL at 05:08

## 2022-08-31 RX ADMIN — Medication 1 TABLET: at 09:08

## 2022-08-31 RX ADMIN — POTASSIUM CHLORIDE 20 MEQ: 750 TABLET, EXTENDED RELEASE ORAL at 08:08

## 2022-08-31 NOTE — PT/OT/SLP PROGRESS
Occupational Therapy   Treatment    Name: Rosa Alejo  MRN: [id number]  Admitting Diagnosis:  Hip fracture requiring operative repair; Fall introchanteric fx of L femur; s/p closed reduction and nailing on 8/21/22      Recommendations:     Discharge Recommendations: home with home health  Discharge Equipment Recommendations:  walker, rolling, wheelchair  Barriers to discharge:       Assessment:     Rosa Alejo is a 82 y.o. female with a medical diagnosis of Hip fracture requiring operative repair Fall introchanteric fx of L femur; s/p closed reduction and nailing on 8/21/22.  She presents with decreased balance, safety, strength and mobility. Performance deficits affecting function are weakness, impaired endurance, impaired self care skills, impaired functional mobility, impaired balance, decreased coordination, decreased safety awareness. Pt reports being hot and fatigue prior to session. Pt educated on the importance of OOB activity. Pt has decreased endurance and strength due to poor caloric intake. OT has concluded that the pt has a desire to eat but the physical aspect of chewing, swallowing and feed fatigues the pt. Pt poor appetite was reported in morning meeting and to the dietitian to establish a plan of intervention. Pt complete supine to sit t/f with Min A using there RLE to support her LLE.Pt required increased time with this ADL as well and LE dressing. Pt required more cues to participate on today. Pt completed LE dressing using the sock aid.        Rehab Prognosis:  Fair; patient would benefit from acute skilled OT services to address these deficits and reach maximum level of function.       Plan:     Patient to be seen 5 x/week to address the above listed problems via self-care/home management, therapeutic activities, therapeutic exercises, neuromuscular re-education  Plan of Care Expires:    Plan of Care Reviewed with: patient    Subjective     Pain/Comfort:  5/10    Objective:      Communicated with: Pt prior to session.  Patient found supine   upon OT entry to room.    General Precautions: Standard, fall   Orthopedic Precautions:    Braces:    Respiratory Status: Room air     Occupational Performance:     Bed Mobility:    Supine to sit t/f with Min A using bed rails and OT assistance for management of LLE    Functional Mobility/Transfers:  Patient was t/f to and from the therapy gym in the w/c by OT however she completed functional ambulation down to the nursing station the RW for balance and safety.     Activities of Daily Living:  T/f assessed at [facility] with sit to stand from edge of bed to the RW  for safety and balance  LE dressing with Mod-Min A using the RW and reacher with MAX cues due to decreased cognition and safety    [facility] 6 Click ADL:      Treatment & Education:  Therapeutic activity:  While sitting unsupported on the mat the pt [id number] mins on arm ergometer to increase endurance, BUE strength and activity tolerance for ADL performance    Therapeutic exercise:    While sitting in the w/c, the pt completed scapular retraction 2 sets of 12 reps without resistance to increase posture and reduce forward shoulders; seated row 2 sets of 15 reps with 3# dowel again a red theraband held by OT to increase BUE strength and posture for upright sitting, balance  and swallowing.       Patient left supine with all lines intact and call button in reach    GOALS:   Multidisciplinary Problems       Occupational Therapy Goals          Problem: Occupational Therapy    Goal Priority Disciplines Outcome Interventions   Occupational Therapy Goal     OT, PT/OT Ongoing, Progressing    Description: Goals to be met by: 09/24/22     Patient will increase functional independence with ADLs by performing:    UE Dressing with Modified [place].  LE Dressing with Supervision using a/e as needed  Grooming while standing at sink with Modified [place].  Toileting from toilet with Modified  [place] and Supervision for hygiene and clothing management.   Standing x5-10 minutes with Supervision.  Step transfer with Modified [place] and Supervision  Toilet transfer to toilet with Modified [place] and Supervision.                         Time Tracking:     OT Date of Treatment:    OT Start Time:  10:40  OT Stop Time:  11:25  OT Total Time (min):      Billable Minutes:Self Care/Home Management 15  Therapeutic Exercise 15   Therapeutic Activity:15    OT/GIOVANNI: OT          8/31/2022

## 2022-08-31 NOTE — PLAN OF CARE
Problem: Adult Inpatient Plan of Care  Goal: Absence of Hospital-Acquired Illness or Injury  Intervention: Identify and Manage Fall Risk  Flowsheets (Taken 8/31/2022 0314)  Safety Promotion/Fall Prevention:   assistive device/personal item within reach   nonskid shoes/socks when out of bed   bed alarm set   side rails raised x 3   instructed to call staff for mobility   other (see comments)  Intervention: Prevent Skin Injury  Flowsheets (Taken 8/31/2022 0314)  Body Position: position maintained  Skin Protection: incontinence pads utilized  Intervention: Prevent and Manage VTE (Venous Thromboembolism) Risk  Flowsheets (Taken 8/31/2022 0314)  Activity Management: Rolling - L1  VTE Prevention/Management: remove, assess skin, and reapply sequential compression device  Range of Motion: active ROM (range of motion) encouraged  Intervention: Prevent Infection  Flowsheets (Taken 8/31/2022 0314)  Infection Prevention:   hand hygiene promoted   equipment surfaces disinfected  Goal: Optimal Comfort and Wellbeing  Intervention: Monitor Pain and Promote Comfort  Flowsheets (Taken 8/31/2022 0314)  Pain Management Interventions:   quiet environment facilitated   pillow support provided  Intervention: Provide Person-Centered Care  Flowsheets (Taken 8/31/2022 0314)  Trust Relationship/Rapport:   emotional support provided   reassurance provided

## 2022-08-31 NOTE — PT/OT/SLP PROGRESS
Physical Therapy Treatment    Patient Name:  Rosa Alejo   MRN:  [id number]    Recommendations:     Discharge Recommendations:  home with home health   Discharge Equipment Recommendations: walker, rolling, wheelchair   Barriers to discharge: None    Assessment:     Rosa Alejo is a 82 y.o. female admitted with a medical diagnosis of Left Hip fracture requiring operative repair.  She presents with the following impairments/functional limitations:  weakness, impaired endurance, impaired functional mobility, gait instability, impaired balance, decreased lower extremity function, decreased safety awareness, pain, decreased ROM, impaired coordination.    Pt tolerated tx fair to well with freq rest breaks due to extreme fatigue with all functional mobility. Pt requires max encouragement to perform majority of tasks with therapy staff presenting with failure to thrive mentality. Pt is educated importance of getting proper nutritions with eating each meal/snacks provided each day to have the adequate energy to participate with PT due to her present limitations/conditions to safely discharge back home to decrease burden of care from others.     Rehab Prognosis: Fair; patient would benefit from acute skilled PT services to address these deficits and reach maximum level of function.    Recent Surgery: * No surgery found *      Plan:     During this hospitalization, patient to be seen 5 x/week to address the identified rehab impairments via gait training, therapeutic activities, therapeutic exercises and progress toward the following goals:    Plan of Care Expires:  09/22/22    Subjective     Chief Complaint: left side of neck sharp pain during exercises sitting at EOM  Patient/Family Comments/goals: return back home with 24/7 sitters/family  Pain/Comfort:  Pain Rating 1: 6/10  Location - Side 1: Left  Location - Orientation 1: upper  Location 1: neck  Pain Addressed 1: Distraction      Objective:     Communicated with  patient and OT prior to session. Patient seated in WC with B GRISEL hose donned upon PT entry to room.     General Precautions: Standard, fall   Orthopedic Precautions:LLE weight bearing as tolerated   Braces:    Respiratory Status: Room air     Functional Mobility:  Sit to stand from EOM to RW: [place]   Stand pivot t/f from WC to chair in room: [place]      AM-PAC 6 CLICK MOBILITY          Therapeutic Activities and Exercises: to improve BLE strength, endurance, ROM, improve sitting and standing tolerance unsupported/supported, core stability, trunk strengthening    UBE dynamic act sitting at EOM unsupported x10min  Seated at EOM exercises performed: Ball squeezes, Heel raises, toe raises, LAQS 1#, marches 1# all 2 x 10     Patient left reclined in chair, B heels floating using pillows with caregiver present with call button in reach.    GOALS:   Multidisciplinary Problems       Physical Therapy Goals          Problem: Physical Therapy    Goal Priority Disciplines Outcome Goal Variances Interventions   Physical Therapy Goal     PT, PT/OT Ongoing, Progressing     Description: Goals to be met by: 2 weeks     Patient will increase functional independence with mobility by performin. Supine to sit with MInimal Assistance  2. Sit to supine with MInimal Assistance  3. Rolling to Left and Right with Minimal Assistance.  4. Sit to stand transfer with Minimal Assistance  5. Bed to chair transfer with Minimal Assistance using Rolling Walker  6. Gait  x 50 feet with Moderate Assistance using Rolling Walker.     Goals to be met by: 4 weeks     Patient will increase functional independence with mobility by performin. Supine to sit with Stand-by Assistance  2. Sit to supine with Stand-by Assistance  3. Rolling to Left and Right with Stand-by Assistance.  4. Sit to stand transfer with Minimal Assistance  5. Bed to chair transfer with Contact Guard Assistance using Rolling Walker  6. Gait  x 150 feet with Minimal Assistance  using Rolling Walker.                          Time Tracking:     PT Received On: 08/31/22  PT Start Time: 1110     PT Stop Time: 1200  PT Total Time (min): 50 min   Co tx with OT: 15min     Billable Minutes: Therapeutic Exercise 20, Gait training 10, Therapeutic Activity 20    Treatment Type: Treatment  PT/PTA: PTA     PTA Visit Number: 5     08/31/2022

## 2022-08-31 NOTE — NURSING
Pt off unit noted; Pt at physical therapy at this time noted on nursing round check. Sitter noted in room.

## 2022-09-01 PROCEDURE — 97116 GAIT TRAINING THERAPY: CPT

## 2022-09-01 PROCEDURE — 27000941

## 2022-09-01 PROCEDURE — 97530 THERAPEUTIC ACTIVITIES: CPT

## 2022-09-01 PROCEDURE — 11000004 HC SNF PRIVATE

## 2022-09-01 PROCEDURE — 63600175 PHARM REV CODE 636 W HCPCS: Performed by: FAMILY MEDICINE

## 2022-09-01 PROCEDURE — 27000987 HC MATTRESS, MATRIX LOW PROFILE

## 2022-09-01 PROCEDURE — 25000003 PHARM REV CODE 250: Performed by: NURSE PRACTITIONER

## 2022-09-01 PROCEDURE — 25000003 PHARM REV CODE 250: Performed by: FAMILY MEDICINE

## 2022-09-01 PROCEDURE — 97110 THERAPEUTIC EXERCISES: CPT

## 2022-09-01 RX ADMIN — Medication 2000 UNITS: at 11:09

## 2022-09-01 RX ADMIN — POTASSIUM CHLORIDE 20 MEQ: 750 TABLET, EXTENDED RELEASE ORAL at 08:09

## 2022-09-01 RX ADMIN — DONEPEZIL HYDROCHLORIDE 10 MG: 5 TABLET, FILM COATED ORAL at 11:09

## 2022-09-01 RX ADMIN — CARBIDOPA AND LEVODOPA 1 TABLET: 25; 100 TABLET ORAL at 11:09

## 2022-09-01 RX ADMIN — TRAMADOL HYDROCHLORIDE 50 MG: 50 TABLET, COATED ORAL at 05:09

## 2022-09-01 RX ADMIN — QUETIAPINE FUMARATE 12.5 MG: 25 TABLET, FILM COATED ORAL at 08:09

## 2022-09-01 RX ADMIN — ALUMINUM HYDROXIDE, MAGNESIUM HYDROXIDE, AND SIMETHICONE 30 ML: 200; 200; 20 SUSPENSION ORAL at 05:09

## 2022-09-01 RX ADMIN — ENOXAPARIN SODIUM 30 MG: 100 INJECTION SUBCUTANEOUS at 05:09

## 2022-09-01 RX ADMIN — ALUMINUM HYDROXIDE, MAGNESIUM HYDROXIDE, AND SIMETHICONE 30 ML: 200; 200; 20 SUSPENSION ORAL at 11:09

## 2022-09-01 RX ADMIN — PANTOPRAZOLE SODIUM 20 MG: 20 TABLET, DELAYED RELEASE ORAL at 11:09

## 2022-09-01 RX ADMIN — CARBIDOPA AND LEVODOPA 1 TABLET: 25; 100 TABLET ORAL at 05:09

## 2022-09-01 RX ADMIN — NYSTATIN 500000 UNITS: 500000 SUSPENSION ORAL at 11:09

## 2022-09-01 RX ADMIN — NYSTATIN 500000 UNITS: 500000 SUSPENSION ORAL at 05:09

## 2022-09-01 RX ADMIN — LINACLOTIDE 145 MCG: 145 CAPSULE, GELATIN COATED ORAL at 06:09

## 2022-09-01 RX ADMIN — ASPIRIN 325 MG ORAL TABLET 325 MG: 325 PILL ORAL at 11:09

## 2022-09-01 RX ADMIN — VENLAFAXINE HYDROCHLORIDE 75 MG: 75 CAPSULE, EXTENDED RELEASE ORAL at 11:09

## 2022-09-01 RX ADMIN — POTASSIUM CHLORIDE 20 MEQ: 750 TABLET, EXTENDED RELEASE ORAL at 11:09

## 2022-09-01 RX ADMIN — CARBIDOPA AND LEVODOPA 1 TABLET: 25; 100 TABLET ORAL at 06:09

## 2022-09-01 RX ADMIN — LEVOTHYROXINE SODIUM 88 MCG: 0.09 TABLET ORAL at 05:09

## 2022-09-01 RX ADMIN — DOCUSATE SODIUM 100 MG: 100 CAPSULE, LIQUID FILLED ORAL at 11:09

## 2022-09-01 RX ADMIN — Medication 1 TABLET: at 11:09

## 2022-09-01 RX ADMIN — NYSTATIN 500000 UNITS: 500000 SUSPENSION ORAL at 08:09

## 2022-09-01 RX ADMIN — ALUMINUM HYDROXIDE, MAGNESIUM HYDROXIDE, AND SIMETHICONE 30 ML: 200; 200; 20 SUSPENSION ORAL at 08:09

## 2022-09-01 RX ADMIN — CETIRIZINE HYDROCHLORIDE 10 MG: 10 TABLET, FILM COATED ORAL at 11:09

## 2022-09-01 NOTE — PLAN OF CARE
Problem: Physical Therapy  Goal: Physical Therapy Goal  Description: Goals to be met by: 2 weeks     Patient will increase functional independence with mobility by performin. Supine to sit with MInimal Assistance. Goal Met.  2. Sit to supine with MInimal Assistance. Goal Met.  3. Rolling to Left and Right with Minimal Assistance. Goal Met.  4. Sit to stand transfer with Minimal Assistance.  5. Bed to chair transfer with Minimal Assistance using Rolling Walker  6. Gait  x 50 feet with Moderate Assistance using Rolling Walker.     Goals to be met by: 4 weeks     Patient will increase functional independence with mobility by performin. Supine to sit with Stand-by Assistance  2. Sit to supine with Stand-by Assistance  3. Rolling to Left and Right with Stand-by Assistance.  4. Sit to stand transfer with Minimal Assistance  5. Bed to chair transfer with Contact Guard Assistance using Rolling Walker  6. Gait  x 150 feet with Minimal Assistance using Rolling Walker.     Outcome: Ongoing, Progressing

## 2022-09-01 NOTE — PT/OT/SLP PROGRESS
Occupational Therapy   Treatment    Name: Rosa Alejo  MRN: [id number]  Admitting Diagnosis:  Hip fracture requiring operative repair; Fall introchanteric fx of L femur; s/p closed reduction and nailing on 8/21/22      Recommendations:     Discharge Recommendations: home with home health  Discharge Equipment Recommendations:  walker, rolling, wheelchair  Barriers to discharge:       Assessment:     Rosa Alejo is a 82 y.o. female with a medical diagnosis of Hip fracture requiring operative repair Fall introchanteric fx of L femur; s/p closed reduction and nailing on 8/21/22.  She presents with decreased balance, safety, strength and mobility. Performance deficits affecting function are weakness, impaired endurance, impaired self care skills, impaired functional mobility, impaired balance, decreased coordination, decreased safety awareness. Pt complained of fatigue at the beginning of her session. Pt continues to bed educated on the importance of eating in order to increase overall strength and therapy tolerance. Pt was encouraged to give effort when completed ADLS and she was able to complete wiping and toilet t/f  with CGA. Pt's facial expression seems down therefore the pt was wheeled outside for fresh air and sunshine.       Rehab Prognosis:  Fair; patient would benefit from acute skilled OT services to address these deficits and reach maximum level of function.       Plan:     Patient to be seen 5 x/week to address the above listed problems via self-care/home management, therapeutic activities, therapeutic exercises, neuromuscular re-education  Plan of Care Expires:    Plan of Care Reviewed with: patient    Subjective     Pain/Comfort:  5/10    Objective:     Communicated with: Pt prior to session.  Patient found supine   upon OT entry to room.    General Precautions: Standard, fall   Orthopedic Precautions:    Braces:    Respiratory Status: Room air     Occupational Performance:     Bed Mobility:     Supine to sit t/f with Min A using bed rails and OT assistance for management of LLE    Functional Mobility/Transfers:  Patient was from the therapy gym in the w/c by OT however she completed functional ambulation down to the restroom using the RW for balance and safety.     Activities of Daily Living:  T/f assessed at CGA with sit to stand from edge of bed to the RW  for safety and balance  Toileting/toilet t/f with CGA using RW and BSC.      [facility] 6 Click ADL:      Treatment & Education:      Patient left supine with all lines intact and call button in reach    GOALS:   Multidisciplinary Problems       Occupational Therapy Goals          Problem: Occupational Therapy    Goal Priority Disciplines Outcome Interventions   Occupational Therapy Goal     OT, PT/OT Ongoing, Progressing    Description: Goals to be met by: 09/24/22     Patient will increase functional independence with ADLs by performing:    UE Dressing with Modified [place].  LE Dressing with Supervision using a/e as needed  Grooming while standing at sink with Modified [place].  Toileting from toilet with Modified [place] and Supervision for hygiene and clothing management.   Standing x5-10 minutes with Supervision.  Step transfer with Modified [place] and Supervision  Toilet transfer to toilet with Modified [place] and Supervision.                         Time Tracking:     OT Date of Treatment:    OT Start Time:  10:30  OT Stop Time:  11:00  OT Total Time (min):      Billable Minutes:Self Care/Home Management 30    OT/GIOVANNI: OT          9/1/2022

## 2022-09-01 NOTE — PT/OT/SLP PROGRESS
Physical Therapy Treatment    Patient Name:  Rosa Alejo   MRN:  [id number]    Recommendations:     Discharge Recommendations:  home with home health   Discharge Equipment Recommendations: wheelchair   Barriers to discharge: None    Assessment:     Rosa Alejo is a 82 y.o. female admitted with a medical diagnosis of Left Hip fracture requiring operative repair.  She presents with the following impairments/functional limitations:  weakness, impaired endurance, impaired self care skills, impaired functional mobility, gait instability.    Pt tolerated tx well with freq rest breaks due to fatigue with all functional mobility. Pt able to demonstrate improved bed mobility, transfers and ambulation distance today.    Rehab Prognosis: Fair; patient would benefit from acute skilled PT services to address these deficits and reach maximum level of function.    Recent Surgery: * No surgery found *      Plan:     During this hospitalization, patient to be seen 5 x/week to address the identified rehab impairments via gait training, therapeutic activities, therapeutic exercises, neuromuscular re-education and progress toward the following goals:    Plan of Care Expires:  09/22/22    Subjective     Chief Complaint: fatigue  Patient/Family Comments/goals: return back home with 24/7 sitters/family  Pain/Comfort:  Pain Rating 1: 0/10      Objective:     Communicated with patient prior to session. Patient supine in bed with B GRISEL hose donned upon PT entry to room.     General Precautions: Standard, fall   Orthopedic Precautions:LLE weight bearing as tolerated   Braces: N/A  Respiratory Status: Room air     Functional Mobility:  Rolling left to right: Min A  Sit to stand from EOB and wheelchair with RW: [place] to Mod A  Stand pivot t/f from WC to chair in room: [place]  Ambulation: 15 feet with RW Min A      AM-PAC 6 CLICK MOBILITY  Turning over in bed (including adjusting bedclothes, sheets and blankets)?: 3  Sitting down on and  standing up from a chair with arms (e.g., wheelchair, bedside commode, etc.): 3  Moving from lying on back to sitting on the side of the bed?: 3  Moving to and from a bed to a chair (including a wheelchair)?: 3  Need to walk in hospital room?: 2  Climbing 3-5 steps with a railing?: 2  Basic Mobility Total Score: 16       Therapeutic Activities and Exercises: to improve BLE strength, endurance, ROM, improve sitting and standing tolerance unsupported/supported, core stability, trunk strengthening    Seated stepper machine x 5 minutes.  Standing marches 3 x 10  Seated exercises performed: LAQS 3 x 10    Sit to stand transfers from wheelchair Min to Mod A x 5 trials requiring verbal and tactile cues to educate pt on proper transfer technique.    Patient left in wheelchair with OT.    GOALS:   Multidisciplinary Problems       Physical Therapy Goals          Problem: Physical Therapy    Goal Priority Disciplines Outcome Goal Variances Interventions   Physical Therapy Goal     PT, PT/OT Ongoing, Progressing     Description: Goals to be met by: 2 weeks     Patient will increase functional independence with mobility by performin. Supine to sit with MInimal Assistance. Goal Met.  2. Sit to supine with MInimal Assistance. Goal Met.  3. Rolling to Left and Right with Minimal Assistance. Goal Met.  4. Sit to stand transfer with Minimal Assistance.  5. Bed to chair transfer with Minimal Assistance using Rolling Walker  6. Gait  x 50 feet with Moderate Assistance using Rolling Walker.     Goals to be met by: 4 weeks     Patient will increase functional independence with mobility by performin. Supine to sit with Stand-by Assistance  2. Sit to supine with Stand-by Assistance  3. Rolling to Left and Right with Stand-by Assistance.  4. Sit to stand transfer with Minimal Assistance  5. Bed to chair transfer with Contact Guard Assistance using Rolling Walker  6. Gait  x 150 feet with Minimal Assistance using Rolling  Mu.                          Time Tracking:     PT Received On: 09/01/22  PT Start Time: 1100     PT Stop Time: 1145  PT Total Time (min): 45 min   Co tx with OT: 15min     Billable Minutes: Therapeutic Exercise 20, Gait training 10, Therapeutic Activity 15    Treatment Type: Treatment, 6th Visit  PT/PTA: PT     PTA Visit Number: 5 09/01/2022

## 2022-09-02 PROCEDURE — 25000003 PHARM REV CODE 250: Performed by: FAMILY MEDICINE

## 2022-09-02 PROCEDURE — 97535 SELF CARE MNGMENT TRAINING: CPT

## 2022-09-02 PROCEDURE — 63600175 PHARM REV CODE 636 W HCPCS: Performed by: FAMILY MEDICINE

## 2022-09-02 PROCEDURE — 25000003 PHARM REV CODE 250: Performed by: NURSE PRACTITIONER

## 2022-09-02 PROCEDURE — 27000941

## 2022-09-02 PROCEDURE — 11000004 HC SNF PRIVATE

## 2022-09-02 RX ADMIN — CARBIDOPA AND LEVODOPA 1 TABLET: 25; 100 TABLET ORAL at 06:09

## 2022-09-02 RX ADMIN — NYSTATIN 500000 UNITS: 500000 SUSPENSION ORAL at 01:09

## 2022-09-02 RX ADMIN — CETIRIZINE HYDROCHLORIDE 10 MG: 10 TABLET, FILM COATED ORAL at 09:09

## 2022-09-02 RX ADMIN — PANTOPRAZOLE SODIUM 20 MG: 20 TABLET, DELAYED RELEASE ORAL at 09:09

## 2022-09-02 RX ADMIN — CARBIDOPA AND LEVODOPA 1 TABLET: 25; 100 TABLET ORAL at 12:09

## 2022-09-02 RX ADMIN — ENOXAPARIN SODIUM 30 MG: 100 INJECTION SUBCUTANEOUS at 04:09

## 2022-09-02 RX ADMIN — NYSTATIN 500000 UNITS: 500000 SUSPENSION ORAL at 09:09

## 2022-09-02 RX ADMIN — VENLAFAXINE HYDROCHLORIDE 75 MG: 75 CAPSULE, EXTENDED RELEASE ORAL at 09:09

## 2022-09-02 RX ADMIN — Medication 2000 UNITS: at 09:09

## 2022-09-02 RX ADMIN — CARBIDOPA AND LEVODOPA 1 TABLET: 25; 100 TABLET ORAL at 04:09

## 2022-09-02 RX ADMIN — ALUMINUM HYDROXIDE, MAGNESIUM HYDROXIDE, AND SIMETHICONE 30 ML: 200; 200; 20 SUSPENSION ORAL at 08:09

## 2022-09-02 RX ADMIN — POTASSIUM CHLORIDE 20 MEQ: 750 TABLET, EXTENDED RELEASE ORAL at 09:09

## 2022-09-02 RX ADMIN — QUETIAPINE FUMARATE 12.5 MG: 25 TABLET, FILM COATED ORAL at 08:09

## 2022-09-02 RX ADMIN — ASPIRIN 325 MG ORAL TABLET 325 MG: 325 PILL ORAL at 09:09

## 2022-09-02 RX ADMIN — NYSTATIN 500000 UNITS: 500000 SUSPENSION ORAL at 08:09

## 2022-09-02 RX ADMIN — DONEPEZIL HYDROCHLORIDE 10 MG: 5 TABLET, FILM COATED ORAL at 09:09

## 2022-09-02 RX ADMIN — Medication 1 TABLET: at 09:09

## 2022-09-02 RX ADMIN — CARBIDOPA AND LEVODOPA 1 TABLET: 25; 100 TABLET ORAL at 09:09

## 2022-09-02 RX ADMIN — LEVOTHYROXINE SODIUM 88 MCG: 0.09 TABLET ORAL at 06:09

## 2022-09-02 RX ADMIN — POTASSIUM CHLORIDE 20 MEQ: 750 TABLET, EXTENDED RELEASE ORAL at 08:09

## 2022-09-02 RX ADMIN — ALUMINUM HYDROXIDE, MAGNESIUM HYDROXIDE, AND SIMETHICONE 30 ML: 200; 200; 20 SUSPENSION ORAL at 04:09

## 2022-09-02 RX ADMIN — ALUMINUM HYDROXIDE, MAGNESIUM HYDROXIDE, AND SIMETHICONE 30 ML: 200; 200; 20 SUSPENSION ORAL at 12:09

## 2022-09-02 RX ADMIN — DOCUSATE SODIUM 100 MG: 100 CAPSULE, LIQUID FILLED ORAL at 09:09

## 2022-09-02 RX ADMIN — ALUMINUM HYDROXIDE, MAGNESIUM HYDROXIDE, AND SIMETHICONE 30 ML: 200; 200; 20 SUSPENSION ORAL at 06:09

## 2022-09-02 RX ADMIN — NYSTATIN 500000 UNITS: 500000 SUSPENSION ORAL at 04:09

## 2022-09-02 RX ADMIN — LINACLOTIDE 145 MCG: 145 CAPSULE, GELATIN COATED ORAL at 06:09

## 2022-09-02 NOTE — PT/OT/SLP PROGRESS
Occupational Therapy   Treatment    Name: Rosa Alejo  MRN: [id number]  Admitting Diagnosis:  Hip fracture requiring operative repair; Fall introchanteric fx of L femur; s/p closed reduction and nailing on 8/21/22      Recommendations:     Discharge Recommendations: home with home health  Discharge Equipment Recommendations:  walker, rolling, wheelchair  Barriers to discharge:       Assessment:     Rosa Alejo is a 82 y.o. female with a medical diagnosis of Hip fracture requiring operative repair Fall introchanteric fx of L femur; s/p closed reduction and nailing on 8/21/22.  She presents with decreased balance, safety, strength and mobility. Performance deficits affecting function are weakness, impaired endurance, impaired self care skills, impaired functional mobility, impaired balance, decreased coordination, decreased safety awareness. Pt tolerated skilled OT fairly but complained of fatigue and diarrhea. Pt completed functional ambulation to the down the hallway using the RW. Pt had an incontinent episode resulting in stool on the floor of the therapy gym. Pt required assistance for cleaning but she was able to assist with wiping. Pt unfortunately had to sit back on the toilet after cleaning due to more diarrhea    Rehab Prognosis:  Fair; patient would benefit from acute skilled OT services to address these deficits and reach maximum level of function.       Plan:     Patient to be seen 5 x/week to address the above listed problems via self-care/home management, therapeutic activities, therapeutic exercises, neuromuscular re-education  Plan of Care Expires:    Plan of Care Reviewed with: patient    Subjective     Pain/Comfort:  5/10    Objective:     Communicated with: Pt prior to session.  Patient found supine   upon OT entry to room.    General Precautions: Standard, fall   Orthopedic Precautions:    Braces:    Respiratory Status: Room air     Occupational Performance:     Bed Mobility:    Supine to sit  t/f with CGA using bed rails and OT assistance for management of LLE    Functional Mobility/Transfers:  Patient ambulated from the bed down the hallway using the RW for balance and safety with CGA  Sit to stand t/f with SNV-Mod I from the mat, bedside and w/c    Activities of Daily Living:  T/f assessed at CGA with sit to stand from edge of bed to the RW  for safety and balance  Toileting/toilet t/f with Min-Mod A for cleaning using RW and raised toilet seat x15  UE dressing: setup to don gown.       [facility] 6 Click ADL:      Treatment & Education:      Patient left supine with all lines intact and call button in reach    GOALS:   Multidisciplinary Problems       Occupational Therapy Goals          Problem: Occupational Therapy    Goal Priority Disciplines Outcome Interventions   Occupational Therapy Goal     OT, PT/OT Ongoing, Progressing    Description: Goals to be met by: 09/24/22     Patient will increase functional independence with ADLs by performing:    UE Dressing with Modified [place].  LE Dressing with Supervision using a/e as needed  Grooming while standing at sink with Modified [place].  Toileting from toilet with Modified [place] and Supervision for hygiene and clothing management.   Standing x5-10 minutes with Supervision.  Step transfer with Modified [place] and Supervision  Toilet transfer to toilet with Modified [place] and Supervision.                         Time Tracking:     OT Date of Treatment:    OT Start Time: 9:40  OT Stop Time:  10:35  OT Total Time (min):      Billable Minutes:Self Care/Home Management 55    OT/GIOVANNI: OT          9/2/2022

## 2022-09-02 NOTE — PROGRESS NOTES
11:15 AM--PTA attempted to tx pt this morning before lunch but pt requested for PTA to come back due to severe diarrhea present. PTA informed pt she could come back around 2:00 pm due to other pt's scheduled right after lunch in outpatient. Pt demo understanding and agreeable to afternoon tx.    13:05 PM--Pt was ambulating out of bathroom using RW to chair with CNA present upon entering pt's room this afternoon. PTA asked if pt was ready to participate and pt refused x2 due to still having episodes of severe diarrhea and requested a pain pill for left hip. PTA educated pt of importance of participation but pt still refused wanting to wait until she felt better. PTA left pt reclined in chair with B heel floating with sitter present.  Nursing was notified that pt was requesting pain pill.

## 2022-09-02 NOTE — PLAN OF CARE
Problem: Adult Inpatient Plan of Care  Goal: Plan of Care Review  Outcome: Ongoing, Progressing  Goal: Patient-Specific Goal (Individualized)  Outcome: Ongoing, Progressing  Goal: Absence of Hospital-Acquired Illness or Injury  Outcome: Ongoing, Progressing  Goal: Optimal Comfort and Wellbeing  Outcome: Ongoing, Progressing  Goal: Readiness for Transition of Care  Outcome: Ongoing, Progressing     Problem: Fall Injury Risk  Goal: Absence of Fall and Fall-Related Injury  Outcome: Ongoing, Progressing     Problem: Pain Acute  Goal: Acceptable Pain Control and Functional Ability  Outcome: Ongoing, Progressing     Problem: Skin Injury Risk Increased  Goal: Skin Health and Integrity  Outcome: Ongoing, Progressing     Problem: Adjustment to Injury (Hip Fracture Medical Management)  Goal: Optimal Coping with Change in Health Status  Outcome: Ongoing, Progressing

## 2022-09-02 NOTE — PLAN OF CARE
Pt. Is still working with therapy for strengthening and safety following Lt. Femur fracture. Pt. Has a sitter that stays with her and family visits daily. Will cont. To follow pt.

## 2022-09-03 PROCEDURE — 25000003 PHARM REV CODE 250: Performed by: FAMILY MEDICINE

## 2022-09-03 PROCEDURE — 27000941

## 2022-09-03 PROCEDURE — 11000004 HC SNF PRIVATE

## 2022-09-03 PROCEDURE — 63600175 PHARM REV CODE 636 W HCPCS: Performed by: FAMILY MEDICINE

## 2022-09-03 PROCEDURE — 25000003 PHARM REV CODE 250: Performed by: NURSE PRACTITIONER

## 2022-09-03 RX ADMIN — NYSTATIN 500000 UNITS: 500000 SUSPENSION ORAL at 01:09

## 2022-09-03 RX ADMIN — CARBIDOPA AND LEVODOPA 1 TABLET: 25; 100 TABLET ORAL at 06:09

## 2022-09-03 RX ADMIN — ASPIRIN 325 MG ORAL TABLET 325 MG: 325 PILL ORAL at 10:09

## 2022-09-03 RX ADMIN — DONEPEZIL HYDROCHLORIDE 10 MG: 5 TABLET, FILM COATED ORAL at 10:09

## 2022-09-03 RX ADMIN — CARBIDOPA AND LEVODOPA 1 TABLET: 25; 100 TABLET ORAL at 04:09

## 2022-09-03 RX ADMIN — ALUMINUM HYDROXIDE, MAGNESIUM HYDROXIDE, AND SIMETHICONE 30 ML: 200; 200; 20 SUSPENSION ORAL at 04:09

## 2022-09-03 RX ADMIN — Medication 6 MG: at 09:09

## 2022-09-03 RX ADMIN — ALUMINUM HYDROXIDE, MAGNESIUM HYDROXIDE, AND SIMETHICONE 30 ML: 200; 200; 20 SUSPENSION ORAL at 10:09

## 2022-09-03 RX ADMIN — POTASSIUM CHLORIDE 20 MEQ: 750 TABLET, EXTENDED RELEASE ORAL at 10:09

## 2022-09-03 RX ADMIN — NYSTATIN 500000 UNITS: 500000 SUSPENSION ORAL at 09:09

## 2022-09-03 RX ADMIN — TRAMADOL HYDROCHLORIDE 50 MG: 50 TABLET, COATED ORAL at 03:09

## 2022-09-03 RX ADMIN — ALUMINUM HYDROXIDE, MAGNESIUM HYDROXIDE, AND SIMETHICONE 30 ML: 200; 200; 20 SUSPENSION ORAL at 09:09

## 2022-09-03 RX ADMIN — QUETIAPINE FUMARATE 12.5 MG: 25 TABLET, FILM COATED ORAL at 09:09

## 2022-09-03 RX ADMIN — LEVOTHYROXINE SODIUM 50 MCG: 0.05 TABLET ORAL at 05:09

## 2022-09-03 RX ADMIN — CARBIDOPA AND LEVODOPA 1 TABLET: 25; 100 TABLET ORAL at 01:09

## 2022-09-03 RX ADMIN — ALUMINUM HYDROXIDE, MAGNESIUM HYDROXIDE, AND SIMETHICONE 30 ML: 200; 200; 20 SUSPENSION ORAL at 05:09

## 2022-09-03 RX ADMIN — LINACLOTIDE 145 MCG: 145 CAPSULE, GELATIN COATED ORAL at 06:09

## 2022-09-03 RX ADMIN — NYSTATIN 500000 UNITS: 500000 SUSPENSION ORAL at 10:09

## 2022-09-03 RX ADMIN — ENOXAPARIN SODIUM 30 MG: 100 INJECTION SUBCUTANEOUS at 04:09

## 2022-09-03 RX ADMIN — CETIRIZINE HYDROCHLORIDE 10 MG: 10 TABLET, FILM COATED ORAL at 10:09

## 2022-09-03 RX ADMIN — Medication 1 TABLET: at 10:09

## 2022-09-03 RX ADMIN — POTASSIUM CHLORIDE 20 MEQ: 750 TABLET, EXTENDED RELEASE ORAL at 09:09

## 2022-09-03 RX ADMIN — VENLAFAXINE HYDROCHLORIDE 75 MG: 75 CAPSULE, EXTENDED RELEASE ORAL at 10:09

## 2022-09-03 RX ADMIN — Medication 2000 UNITS: at 10:09

## 2022-09-03 RX ADMIN — PANTOPRAZOLE SODIUM 20 MG: 20 TABLET, DELAYED RELEASE ORAL at 10:09

## 2022-09-03 RX ADMIN — NYSTATIN 500000 UNITS: 500000 SUSPENSION ORAL at 04:09

## 2022-09-04 PROCEDURE — 11000004 HC SNF PRIVATE

## 2022-09-04 PROCEDURE — 25000003 PHARM REV CODE 250: Performed by: NURSE PRACTITIONER

## 2022-09-04 PROCEDURE — 27000941

## 2022-09-04 PROCEDURE — 25000003 PHARM REV CODE 250: Performed by: FAMILY MEDICINE

## 2022-09-04 PROCEDURE — 63600175 PHARM REV CODE 636 W HCPCS: Performed by: FAMILY MEDICINE

## 2022-09-04 RX ADMIN — NYSTATIN 500000 UNITS: 500000 SUSPENSION ORAL at 12:09

## 2022-09-04 RX ADMIN — NYSTATIN 500000 UNITS: 500000 SUSPENSION ORAL at 04:09

## 2022-09-04 RX ADMIN — LEVOTHYROXINE SODIUM 50 MCG: 0.05 TABLET ORAL at 05:09

## 2022-09-04 RX ADMIN — NYSTATIN 500000 UNITS: 500000 SUSPENSION ORAL at 09:09

## 2022-09-04 RX ADMIN — ENOXAPARIN SODIUM 30 MG: 100 INJECTION SUBCUTANEOUS at 04:09

## 2022-09-04 RX ADMIN — LINACLOTIDE 145 MCG: 145 CAPSULE, GELATIN COATED ORAL at 06:09

## 2022-09-04 RX ADMIN — ALUMINUM HYDROXIDE, MAGNESIUM HYDROXIDE, AND SIMETHICONE 30 ML: 200; 200; 20 SUSPENSION ORAL at 04:09

## 2022-09-04 RX ADMIN — ASPIRIN 325 MG ORAL TABLET 325 MG: 325 PILL ORAL at 09:09

## 2022-09-04 RX ADMIN — POTASSIUM CHLORIDE 20 MEQ: 750 TABLET, EXTENDED RELEASE ORAL at 09:09

## 2022-09-04 RX ADMIN — TRAMADOL HYDROCHLORIDE 50 MG: 50 TABLET, COATED ORAL at 08:09

## 2022-09-04 RX ADMIN — PANTOPRAZOLE SODIUM 20 MG: 20 TABLET, DELAYED RELEASE ORAL at 09:09

## 2022-09-04 RX ADMIN — DOCUSATE SODIUM 100 MG: 100 CAPSULE, LIQUID FILLED ORAL at 09:09

## 2022-09-04 RX ADMIN — Medication 6 MG: at 08:09

## 2022-09-04 RX ADMIN — ALUMINUM HYDROXIDE, MAGNESIUM HYDROXIDE, AND SIMETHICONE 30 ML: 200; 200; 20 SUSPENSION ORAL at 08:09

## 2022-09-04 RX ADMIN — TRAMADOL HYDROCHLORIDE 50 MG: 50 TABLET, COATED ORAL at 06:09

## 2022-09-04 RX ADMIN — Medication 1 TABLET: at 09:09

## 2022-09-04 RX ADMIN — CARBIDOPA AND LEVODOPA 1 TABLET: 25; 100 TABLET ORAL at 06:09

## 2022-09-04 RX ADMIN — DONEPEZIL HYDROCHLORIDE 10 MG: 5 TABLET, FILM COATED ORAL at 09:09

## 2022-09-04 RX ADMIN — NYSTATIN 500000 UNITS: 500000 SUSPENSION ORAL at 08:09

## 2022-09-04 RX ADMIN — CARBIDOPA AND LEVODOPA 1 TABLET: 25; 100 TABLET ORAL at 12:09

## 2022-09-04 RX ADMIN — ALUMINUM HYDROXIDE, MAGNESIUM HYDROXIDE, AND SIMETHICONE 30 ML: 200; 200; 20 SUSPENSION ORAL at 05:09

## 2022-09-04 RX ADMIN — CETIRIZINE HYDROCHLORIDE 10 MG: 10 TABLET, FILM COATED ORAL at 09:09

## 2022-09-04 RX ADMIN — CARBIDOPA AND LEVODOPA 1 TABLET: 25; 100 TABLET ORAL at 09:09

## 2022-09-04 RX ADMIN — CARBIDOPA AND LEVODOPA 1 TABLET: 25; 100 TABLET ORAL at 04:09

## 2022-09-04 RX ADMIN — ALUMINUM HYDROXIDE, MAGNESIUM HYDROXIDE, AND SIMETHICONE 30 ML: 200; 200; 20 SUSPENSION ORAL at 11:09

## 2022-09-04 RX ADMIN — POTASSIUM CHLORIDE 20 MEQ: 750 TABLET, EXTENDED RELEASE ORAL at 08:09

## 2022-09-04 RX ADMIN — VENLAFAXINE HYDROCHLORIDE 75 MG: 75 CAPSULE, EXTENDED RELEASE ORAL at 09:09

## 2022-09-04 RX ADMIN — QUETIAPINE FUMARATE 12.5 MG: 25 TABLET, FILM COATED ORAL at 08:09

## 2022-09-04 RX ADMIN — Medication 2000 UNITS: at 09:09

## 2022-09-04 NOTE — PLAN OF CARE
Problem: Adult Inpatient Plan of Care  Goal: Plan of Care Review  Outcome: Ongoing, Progressing  Goal: Patient-Specific Goal (Individualized)  Outcome: Ongoing, Progressing  Goal: Absence of Hospital-Acquired Illness or Injury  Outcome: Ongoing, Progressing  Goal: Optimal Comfort and Wellbeing  Outcome: Ongoing, Progressing  Goal: Readiness for Transition of Care  Outcome: Ongoing, Progressing     Problem: Fall Injury Risk  Goal: Absence of Fall and Fall-Related Injury  Outcome: Ongoing, Progressing     Problem: Pain Acute  Goal: Acceptable Pain Control and Functional Ability  Outcome: Ongoing, Progressing     Problem: Skin Injury Risk Increased  Goal: Skin Health and Integrity  Outcome: Ongoing, Progressing     Problem: Adjustment to Injury (Hip Fracture Medical Management)  Goal: Optimal Coping with Change in Health Status  Outcome: Ongoing, Progressing     Problem: Cognitive Decline Risk (Hip Fracture Medical Management)  Goal: Baseline Cognitive Function Maintained  Outcome: Ongoing, Progressing     Problem: Functional Ability Impaired (Hip Fracture Medical Management)  Goal: Optimal Functional Performance  Outcome: Ongoing, Progressing     Problem: Pain (Hip Fracture Medical Management)  Goal: Acceptable Pain Level  Outcome: Ongoing, Progressing

## 2022-09-05 LAB
ANION GAP SERPL CALCULATED.3IONS-SCNC: 11 MMOL/L (ref 7–16)
ANISOCYTOSIS BLD QL SMEAR: ABNORMAL
BASOPHILS # BLD AUTO: 0.02 K/UL (ref 0–0.2)
BASOPHILS NFR BLD AUTO: 0.3 % (ref 0–1)
BUN SERPL-MCNC: 20 MG/DL (ref 7–18)
BUN/CREAT SERPL: 24 (ref 6–20)
CALCIUM SERPL-MCNC: 8.7 MG/DL (ref 8.5–10.1)
CHLORIDE SERPL-SCNC: 99 MMOL/L (ref 98–107)
CO2 SERPL-SCNC: 31 MMOL/L (ref 21–32)
CREAT SERPL-MCNC: 0.82 MG/DL (ref 0.55–1.02)
DIFFERENTIAL METHOD BLD: ABNORMAL
EGFR (NO RACE VARIABLE) (RUSH/TITUS): 72 ML/MIN/1.73M²
EOSINOPHIL # BLD AUTO: 0.18 K/UL (ref 0–0.5)
EOSINOPHIL NFR BLD AUTO: 2.5 % (ref 1–4)
ERYTHROCYTE [DISTWIDTH] IN BLOOD BY AUTOMATED COUNT: 17.3 % (ref 11.5–14.5)
GLUCOSE SERPL-MCNC: 91 MG/DL (ref 74–106)
HCT VFR BLD AUTO: 31.3 % (ref 38–47)
HGB BLD-MCNC: 9.9 G/DL (ref 12–16)
LYMPHOCYTES # BLD AUTO: 1.54 K/UL (ref 1–4.8)
LYMPHOCYTES NFR BLD AUTO: 21.7 % (ref 27–41)
MACROCYTES BLD QL SMEAR: ABNORMAL
MCH RBC QN AUTO: 32.2 PG (ref 27–31)
MCHC RBC AUTO-ENTMCNC: 31.6 G/DL (ref 32–36)
MCV RBC AUTO: 102 FL (ref 80–96)
MONOCYTES # BLD AUTO: 0.4 K/UL (ref 0–0.8)
MONOCYTES NFR BLD AUTO: 5.6 % (ref 2–6)
MPC BLD CALC-MCNC: 8.4 FL (ref 9.4–12.4)
NEUTROPHILS # BLD AUTO: 4.96 K/UL (ref 1.8–7.7)
NEUTROPHILS NFR BLD AUTO: 69.9 % (ref 53–65)
PLATELET # BLD AUTO: 634 K/UL (ref 150–400)
PLATELET MORPHOLOGY: ABNORMAL
POTASSIUM SERPL-SCNC: 5.6 MMOL/L (ref 3.5–5.1)
RBC # BLD AUTO: 3.07 M/UL (ref 4.2–5.4)
SODIUM SERPL-SCNC: 135 MMOL/L (ref 136–145)
WBC # BLD AUTO: 7.1 K/UL (ref 4.5–11)

## 2022-09-05 PROCEDURE — 36415 COLL VENOUS BLD VENIPUNCTURE: CPT | Performed by: NURSE PRACTITIONER

## 2022-09-05 PROCEDURE — 27000941

## 2022-09-05 PROCEDURE — 63600175 PHARM REV CODE 636 W HCPCS: Performed by: FAMILY MEDICINE

## 2022-09-05 PROCEDURE — 97535 SELF CARE MNGMENT TRAINING: CPT

## 2022-09-05 PROCEDURE — 11000004 HC SNF PRIVATE

## 2022-09-05 PROCEDURE — 85025 COMPLETE CBC W/AUTO DIFF WBC: CPT | Performed by: NURSE PRACTITIONER

## 2022-09-05 PROCEDURE — 80048 BASIC METABOLIC PNL TOTAL CA: CPT | Performed by: NURSE PRACTITIONER

## 2022-09-05 PROCEDURE — 25000003 PHARM REV CODE 250: Performed by: NURSE PRACTITIONER

## 2022-09-05 PROCEDURE — 25000003 PHARM REV CODE 250: Performed by: FAMILY MEDICINE

## 2022-09-05 RX ADMIN — ALUMINUM HYDROXIDE, MAGNESIUM HYDROXIDE, AND SIMETHICONE 30 ML: 200; 200; 20 SUSPENSION ORAL at 05:09

## 2022-09-05 RX ADMIN — CARBIDOPA AND LEVODOPA 1 TABLET: 25; 100 TABLET ORAL at 08:09

## 2022-09-05 RX ADMIN — CARBIDOPA AND LEVODOPA 1 TABLET: 25; 100 TABLET ORAL at 06:09

## 2022-09-05 RX ADMIN — NYSTATIN 500000 UNITS: 500000 SUSPENSION ORAL at 04:09

## 2022-09-05 RX ADMIN — VENLAFAXINE HYDROCHLORIDE 75 MG: 75 CAPSULE, EXTENDED RELEASE ORAL at 08:09

## 2022-09-05 RX ADMIN — ALUMINUM HYDROXIDE, MAGNESIUM HYDROXIDE, AND SIMETHICONE 30 ML: 200; 200; 20 SUSPENSION ORAL at 04:09

## 2022-09-05 RX ADMIN — NYSTATIN 500000 UNITS: 500000 SUSPENSION ORAL at 09:09

## 2022-09-05 RX ADMIN — Medication 6 MG: at 09:09

## 2022-09-05 RX ADMIN — Medication 2000 UNITS: at 08:09

## 2022-09-05 RX ADMIN — CETIRIZINE HYDROCHLORIDE 10 MG: 10 TABLET, FILM COATED ORAL at 08:09

## 2022-09-05 RX ADMIN — ASPIRIN 325 MG ORAL TABLET 325 MG: 325 PILL ORAL at 08:09

## 2022-09-05 RX ADMIN — LEVOTHYROXINE SODIUM 88 MCG: 0.09 TABLET ORAL at 05:09

## 2022-09-05 RX ADMIN — LINACLOTIDE 145 MCG: 145 CAPSULE, GELATIN COATED ORAL at 06:09

## 2022-09-05 RX ADMIN — CARBIDOPA AND LEVODOPA 1 TABLET: 25; 100 TABLET ORAL at 11:09

## 2022-09-05 RX ADMIN — ALUMINUM HYDROXIDE, MAGNESIUM HYDROXIDE, AND SIMETHICONE 30 ML: 200; 200; 20 SUSPENSION ORAL at 11:09

## 2022-09-05 RX ADMIN — ALUMINUM HYDROXIDE, MAGNESIUM HYDROXIDE, AND SIMETHICONE 30 ML: 200; 200; 20 SUSPENSION ORAL at 09:09

## 2022-09-05 RX ADMIN — Medication 1 TABLET: at 08:09

## 2022-09-05 RX ADMIN — DONEPEZIL HYDROCHLORIDE 10 MG: 5 TABLET, FILM COATED ORAL at 08:09

## 2022-09-05 RX ADMIN — CARBIDOPA AND LEVODOPA 1 TABLET: 25; 100 TABLET ORAL at 04:09

## 2022-09-05 RX ADMIN — NYSTATIN 500000 UNITS: 500000 SUSPENSION ORAL at 01:09

## 2022-09-05 RX ADMIN — PANTOPRAZOLE SODIUM 20 MG: 20 TABLET, DELAYED RELEASE ORAL at 08:09

## 2022-09-05 RX ADMIN — QUETIAPINE FUMARATE 12.5 MG: 25 TABLET, FILM COATED ORAL at 09:09

## 2022-09-05 RX ADMIN — NYSTATIN 500000 UNITS: 500000 SUSPENSION ORAL at 08:09

## 2022-09-05 RX ADMIN — ENOXAPARIN SODIUM 30 MG: 100 INJECTION SUBCUTANEOUS at 04:09

## 2022-09-05 NOTE — PT/OT/SLP PROGRESS
Occupational Therapy   Treatment    Name: Rosa Alejo  MRN: [id number]  Admitting Diagnosis:  Hip fracture requiring operative repair; Fall introchanteric fx of L femur; s/p closed reduction and nailing on 8/21/22      Recommendations:     Discharge Recommendations: home with home health  Discharge Equipment Recommendations:  walker, rolling, wheelchair  Barriers to discharge:       Assessment:     Rosa Alejo is a 82 y.o. female with a medical diagnosis of Hip fracture requiring operative repair Fall introchanteric fx of L femur; s/p closed reduction and nailing on 8/21/22.  She presents with decreased balance, safety, strength and mobility. Performance deficits affecting function are weakness, impaired endurance, impaired self care skills, impaired functional mobility, impaired balance, decreased coordination, decreased safety awareness. Pt tolerated skilled OT fairly but complained of fatigue. Pt tolerated ADL retraining well but requires steadying due to balance and weakness. Pt continues to have poor intake as evident by her weak appearance and the pt has been complaining of diarrhea for the last few days. OT to re consult with dietitian on a nutritional plan for the pt due to her limited progress and fluctuations with energy and strength during therapy sessions. Pt requires Mod cues for device management and safety. Pt continue to fatigue swiftly due decreased energy r/t decreased eating.   Rehab Prognosis:  Fair; patient would benefit from acute skilled OT services to address these deficits and reach maximum level of function.       Plan:     Patient to be seen 5 x/week to address the above listed problems via self-care/home management, therapeutic activities, therapeutic exercises, neuromuscular re-education  Plan of Care Expires:    Plan of Care Reviewed with: patient    Subjective     Pain/Comfort:  5/10    Objective:     Communicated with: Pt prior to session.  Patient found supine   upon OT entry  to room.    General Precautions: Standard, fall   Orthopedic Precautions:    Braces:    Respiratory Status: Room air     Occupational Performance:     Bed Mobility:    Supine to sit t/f with CGA using bed rails and OT assistance for management of LLE    Functional Mobility/Transfers:  Patient ambulated from the bed down the hallway using the RW for balance and safety with CGA and to the restroom but required a rest break due to decreased balance and safety with the RW  Sit to stand t/f with SNV-Mod I from the mat, bedside and w/c    Activities of Daily Living:  T/f assessed at CGA with sit to stand from edge of bed to the RW  for safety and balance  Toileting/toilet t/f with Min A for cleaning using RW and raised toilet seat       [facility] 6 Click ADL:      Treatment & Education:      Patient left supine with all lines intact and call button in reach    GOALS:   Multidisciplinary Problems       Occupational Therapy Goals          Problem: Occupational Therapy    Goal Priority Disciplines Outcome Interventions   Occupational Therapy Goal     OT, PT/OT Ongoing, Progressing    Description: Goals to be met by: 09/24/22     Patient will increase functional independence with ADLs by performing:    UE Dressing with Modified [place].  LE Dressing with Supervision using a/e as needed  Grooming while standing at sink with Modified [place].  Toileting from toilet with Modified [place] and Supervision for hygiene and clothing management.   Standing x5-10 minutes with Supervision.  Step transfer with Modified [place] and Supervision  Toilet transfer to toilet with Modified [place] and Supervision.                         Time Tracking:     OT Date of Treatment:    OT Start Time: 3:15  OT Stop Time: 3:45  OT Total Time (min):      Billable Minutes:Self Care/Home [id number]    OT/GIOVANNI: OT          [id number]

## 2022-09-05 NOTE — NURSING
Removed 15 staples from left lateral thigh incision. Steri strips applied. Incision well approximated.        09/05/22 1445        Incision/Site 08/23/22 1530 Left Thigh lateral   Date First Assessed/Time First Assessed: 08/23/22 1530   Present Prior to Hospital Arrival?: Yes  Side: Left  Location: Thigh  Orientation: lateral   Care Cleansed with:;Sterile normal saline;Removed:;Staples;Applied:;Steri-strips

## 2022-09-05 NOTE — PLAN OF CARE
Problem: Adult Inpatient Plan of Care  Goal: Plan of Care Review  Outcome: Ongoing, Progressing  Goal: Patient-Specific Goal (Individualized)  Outcome: Ongoing, Progressing  Goal: Absence of Hospital-Acquired Illness or Injury  Outcome: Ongoing, Progressing  Goal: Optimal Comfort and Wellbeing  Outcome: Ongoing, Progressing  Goal: Readiness for Transition of Care  Outcome: Ongoing, Progressing     Problem: Fall Injury Risk  Goal: Absence of Fall and Fall-Related Injury  Outcome: Ongoing, Progressing     Problem: Pain Acute  Goal: Acceptable Pain Control and Functional Ability  Outcome: Ongoing, Progressing     Problem: Skin Injury Risk Increased  Goal: Skin Health and Integrity  Outcome: Ongoing, Progressing     Problem: Adjustment to Injury (Hip Fracture Medical Management)  Goal: Optimal Coping with Change in Health Status  Outcome: Ongoing, Progressing     Problem: Bowel Elimination Impaired (Hip Fracture Medical Management)  Goal: Effective Bowel Elimination  Outcome: Ongoing, Progressing     Problem: Cognitive Decline Risk (Hip Fracture Medical Management)  Goal: Baseline Cognitive Function Maintained  Outcome: Ongoing, Progressing     Problem: Functional Ability Impaired (Hip Fracture Medical Management)  Goal: Optimal Functional Performance  Outcome: Ongoing, Progressing     Problem: Pain (Hip Fracture Medical Management)  Goal: Acceptable Pain Level  Outcome: Ongoing, Progressing     Problem: Urinary Elimination Impaired (Hip Fracture Medical Management)  Goal: Effective Urinary Elimination  Outcome: Ongoing, Progressing

## 2022-09-05 NOTE — NURSING
Informed Dr. Skinner of patient's elevated potassium level of 5.6 and that I held p.o. potassium this AM. Dr. Skinner voiced understanding.

## 2022-09-05 NOTE — PROGRESS NOTES
Wt: 99# noted.  Noted pt has been having some diarrhea. She has a hx of IBS and alternates between constipation and diarrhea.  She takes Linzess routinely.  She is not currently receiving any ABXs.  Her Colace was held this a.m.. R/T Diarrhea.  K 5.6 this a.m.K held.  She dislikes the magic cup but does drink Ensure Enlive at times.  Meal intake ~35%.  RDN took prefs and communicated these to dietary dept.  Staples scheduled to be removed from surgical incision today.  Rec 1. D/C magic cup

## 2022-09-06 PROBLEM — E87.5 HYPERKALEMIA: Status: ACTIVE | Noted: 2022-09-06

## 2022-09-06 LAB
AMORPH PHOS CRY #/AREA URNS LPF: ABNORMAL /LPF
ANION GAP SERPL CALCULATED.3IONS-SCNC: 9 MMOL/L (ref 7–16)
BACTERIA #/AREA URNS HPF: ABNORMAL /HPF
BILIRUB UR QL STRIP: NEGATIVE
BUN SERPL-MCNC: 21 MG/DL (ref 7–18)
BUN/CREAT SERPL: 23 (ref 6–20)
CALCIUM SERPL-MCNC: 8.4 MG/DL (ref 8.5–10.1)
CHLORIDE SERPL-SCNC: 100 MMOL/L (ref 98–107)
CLARITY UR: ABNORMAL
CO2 SERPL-SCNC: 32 MMOL/L (ref 21–32)
COLOR UR: YELLOW
CREAT SERPL-MCNC: 0.92 MG/DL (ref 0.55–1.02)
EGFR (NO RACE VARIABLE) (RUSH/TITUS): 62 ML/MIN/1.73M²
GLUCOSE SERPL-MCNC: 100 MG/DL (ref 74–106)
GLUCOSE UR STRIP-MCNC: NEGATIVE MG/DL
KETONES UR STRIP-SCNC: ABNORMAL MG/DL
LEUKOCYTE ESTERASE UR QL STRIP: ABNORMAL
MUCOUS THREADS #/AREA URNS HPF: ABNORMAL /HPF
NITRITE UR QL STRIP: NEGATIVE
PH UR STRIP: 7 PH UNITS
POTASSIUM SERPL-SCNC: 4.6 MMOL/L (ref 3.5–5.1)
PROT UR QL STRIP: 30
RBC # UR STRIP: ABNORMAL /UL
RBC #/AREA URNS HPF: ABNORMAL /HPF
SODIUM SERPL-SCNC: 136 MMOL/L (ref 136–145)
SP GR UR STRIP: 1.02
SQUAMOUS #/AREA URNS LPF: ABNORMAL /LPF
UROBILINOGEN UR STRIP-ACNC: 0.2 MG/DL
WBC #/AREA URNS HPF: ABNORMAL /HPF

## 2022-09-06 PROCEDURE — 97116 GAIT TRAINING THERAPY: CPT | Mod: CQ

## 2022-09-06 PROCEDURE — 97530 THERAPEUTIC ACTIVITIES: CPT

## 2022-09-06 PROCEDURE — 36415 COLL VENOUS BLD VENIPUNCTURE: CPT | Performed by: NURSE PRACTITIONER

## 2022-09-06 PROCEDURE — 81001 URINALYSIS AUTO W/SCOPE: CPT | Performed by: NURSE PRACTITIONER

## 2022-09-06 PROCEDURE — 99499 NO LOS: ICD-10-PCS | Mod: ,,, | Performed by: FAMILY MEDICINE

## 2022-09-06 PROCEDURE — 80048 BASIC METABOLIC PNL TOTAL CA: CPT | Performed by: NURSE PRACTITIONER

## 2022-09-06 PROCEDURE — 87186 SC STD MICRODIL/AGAR DIL: CPT | Performed by: NURSE PRACTITIONER

## 2022-09-06 PROCEDURE — 87077 CULTURE AEROBIC IDENTIFY: CPT | Performed by: NURSE PRACTITIONER

## 2022-09-06 PROCEDURE — 27000982 HC MATTRESS, MATRIX LAL RENTAL

## 2022-09-06 PROCEDURE — 63600175 PHARM REV CODE 636 W HCPCS: Performed by: FAMILY MEDICINE

## 2022-09-06 PROCEDURE — 25000003 PHARM REV CODE 250: Performed by: FAMILY MEDICINE

## 2022-09-06 PROCEDURE — 97110 THERAPEUTIC EXERCISES: CPT | Mod: CQ

## 2022-09-06 PROCEDURE — 11000004 HC SNF PRIVATE

## 2022-09-06 PROCEDURE — 27000941

## 2022-09-06 PROCEDURE — 97110 THERAPEUTIC EXERCISES: CPT

## 2022-09-06 PROCEDURE — 99499 UNLISTED E&M SERVICE: CPT | Mod: ,,, | Performed by: FAMILY MEDICINE

## 2022-09-06 PROCEDURE — 97535 SELF CARE MNGMENT TRAINING: CPT

## 2022-09-06 PROCEDURE — 25000003 PHARM REV CODE 250: Performed by: NURSE PRACTITIONER

## 2022-09-06 RX ORDER — POTASSIUM CHLORIDE 750 MG/1
10 TABLET, EXTENDED RELEASE ORAL 2 TIMES DAILY
Status: DISCONTINUED | OUTPATIENT
Start: 2022-09-06 | End: 2022-09-16 | Stop reason: HOSPADM

## 2022-09-06 RX ADMIN — LINACLOTIDE 145 MCG: 145 CAPSULE, GELATIN COATED ORAL at 06:09

## 2022-09-06 RX ADMIN — NYSTATIN 500000 UNITS: 500000 SUSPENSION ORAL at 08:09

## 2022-09-06 RX ADMIN — LEVOTHYROXINE SODIUM 88 MCG: 0.09 TABLET ORAL at 06:09

## 2022-09-06 RX ADMIN — CARBIDOPA AND LEVODOPA 1 TABLET: 25; 100 TABLET ORAL at 04:09

## 2022-09-06 RX ADMIN — ASPIRIN 325 MG ORAL TABLET 325 MG: 325 PILL ORAL at 09:09

## 2022-09-06 RX ADMIN — CARBIDOPA AND LEVODOPA 1 TABLET: 25; 100 TABLET ORAL at 11:09

## 2022-09-06 RX ADMIN — Medication 2000 UNITS: at 09:09

## 2022-09-06 RX ADMIN — ENOXAPARIN SODIUM 30 MG: 100 INJECTION SUBCUTANEOUS at 04:09

## 2022-09-06 RX ADMIN — NYSTATIN 500000 UNITS: 500000 SUSPENSION ORAL at 04:09

## 2022-09-06 RX ADMIN — ALUMINUM HYDROXIDE, MAGNESIUM HYDROXIDE, AND SIMETHICONE 30 ML: 200; 200; 20 SUSPENSION ORAL at 08:09

## 2022-09-06 RX ADMIN — NYSTATIN 500000 UNITS: 500000 SUSPENSION ORAL at 09:09

## 2022-09-06 RX ADMIN — ALUMINUM HYDROXIDE, MAGNESIUM HYDROXIDE, AND SIMETHICONE 30 ML: 200; 200; 20 SUSPENSION ORAL at 11:09

## 2022-09-06 RX ADMIN — CARBIDOPA AND LEVODOPA 1 TABLET: 25; 100 TABLET ORAL at 09:09

## 2022-09-06 RX ADMIN — Medication 1 TABLET: at 09:09

## 2022-09-06 RX ADMIN — CETIRIZINE HYDROCHLORIDE 10 MG: 10 TABLET, FILM COATED ORAL at 09:09

## 2022-09-06 RX ADMIN — VENLAFAXINE HYDROCHLORIDE 75 MG: 75 CAPSULE, EXTENDED RELEASE ORAL at 09:09

## 2022-09-06 RX ADMIN — NYSTATIN 500000 UNITS: 500000 SUSPENSION ORAL at 02:09

## 2022-09-06 RX ADMIN — ALUMINUM HYDROXIDE, MAGNESIUM HYDROXIDE, AND SIMETHICONE 30 ML: 200; 200; 20 SUSPENSION ORAL at 04:09

## 2022-09-06 RX ADMIN — QUETIAPINE FUMARATE 12.5 MG: 25 TABLET, FILM COATED ORAL at 08:09

## 2022-09-06 RX ADMIN — CARBIDOPA AND LEVODOPA 1 TABLET: 25; 100 TABLET ORAL at 06:09

## 2022-09-06 RX ADMIN — POTASSIUM CHLORIDE 10 MEQ: 750 TABLET, EXTENDED RELEASE ORAL at 08:09

## 2022-09-06 RX ADMIN — PANTOPRAZOLE SODIUM 20 MG: 20 TABLET, DELAYED RELEASE ORAL at 09:09

## 2022-09-06 RX ADMIN — ALUMINUM HYDROXIDE, MAGNESIUM HYDROXIDE, AND SIMETHICONE 30 ML: 200; 200; 20 SUSPENSION ORAL at 06:09

## 2022-09-06 RX ADMIN — DONEPEZIL HYDROCHLORIDE 10 MG: 5 TABLET, FILM COATED ORAL at 09:09

## 2022-09-06 NOTE — PLAN OF CARE
Problem: Adult Inpatient Plan of Care  Goal: Plan of Care Review  Outcome: Ongoing, Progressing  Goal: Patient-Specific Goal (Individualized)  Outcome: Ongoing, Progressing  Goal: Absence of Hospital-Acquired Illness or Injury  Outcome: Ongoing, Progressing  Goal: Optimal Comfort and Wellbeing  Outcome: Ongoing, Progressing  Goal: Readiness for Transition of Care  Outcome: Ongoing, Progressing     Problem: Fall Injury Risk  Goal: Absence of Fall and Fall-Related Injury  Outcome: Ongoing, Progressing     Problem: Pain Acute  Goal: Acceptable Pain Control and Functional Ability  Outcome: Ongoing, Progressing     Problem: Skin Injury Risk Increased  Goal: Skin Health and Integrity  Outcome: Ongoing, Progressing     Problem: Pain (Hip Fracture Medical Management)  Goal: Acceptable Pain Level  Outcome: Ongoing, Progressing     Problem: Impaired Wound Healing  Goal: Optimal Wound Healing  Outcome: Ongoing, Progressing     Problem: Urinary Elimination Impaired (Hip Fracture Medical Management)  Goal: Effective Urinary Elimination  Outcome: Ongoing, Progressing

## 2022-09-06 NOTE — SUBJECTIVE & OBJECTIVE
Interval History: Hyperkalemia resolved. Will start on Potassium Chloride 10 meq BID. Will obtain UA for c/o dysuria, frequency and foul smelling urine. Continue current rehabilitation plan until goals are met.    Review of Systems   Constitutional:  Positive for fatigue. Negative for chills and fever.   Respiratory:  Negative for shortness of breath.    Cardiovascular:  Negative for chest pain.   Gastrointestinal:  Negative for abdominal pain, nausea and vomiting.   Genitourinary:  Positive for dysuria and frequency.   Musculoskeletal:  Positive for arthralgias and gait problem.   Skin:  Positive for wound (healed surgical wound to left hip, steris strips inatct, no signs of infection).   Neurological:  Positive for weakness (generalized).   Psychiatric/Behavioral:  Negative for confusion.    All other systems reviewed and are negative.  Objective:     Vital Signs (Most Recent):  Temp: 98.2 °F (36.8 °C) (09/06/22 0808)  Pulse: 73 (09/06/22 0808)  Resp: 18 (09/06/22 0808)  BP: (!) 112/57 (09/06/22 0808)  SpO2: 99 % (09/06/22 0808)   Vital Signs (24h Range):  Temp:  [98.1 °F (36.7 °C)-98.2 °F (36.8 °C)] 98.2 °F (36.8 °C)  Pulse:  [73-81] 73  Resp:  [18] 18  SpO2:  [97 %-99 %] 99 %  BP: ()/(50-57) 112/57     Weight: 45 kg (99 lb 4.8 oz)  Body mass index is 15.55 kg/m².  No intake or output data in the 24 hours ending 09/06/22 1402   Physical Exam  Vitals and nursing note reviewed.   Constitutional:       General: She is not in acute distress.     Appearance: Normal appearance.   HENT:      Mouth/Throat:      Mouth: Mucous membranes are moist.      Pharynx: Oropharynx is clear.   Eyes:      Pupils: Pupils are equal, round, and reactive to light.   Cardiovascular:      Rate and Rhythm: Normal rate and regular rhythm.   Pulmonary:      Effort: Pulmonary effort is normal.      Breath sounds: Normal breath sounds.   Abdominal:      General: Abdomen is flat. Bowel sounds are normal.      Tenderness: There is no  abdominal tenderness. There is no right CVA tenderness or left CVA tenderness.   Musculoskeletal:      Right lower leg: No edema.      Left lower leg: No edema.      Comments: Left lower extremity weakness from recent hip repair   Skin:     General: Skin is warm and dry.      Comments: healed surgical wound to left hip, steris strips inatct, no signs of infection   Neurological:      General: No focal deficit present.      Mental Status: She is alert and oriented to person, place, and time.      Motor: Weakness (generalized) present.      Gait: Gait abnormal.       Significant Labs: All pertinent labs within the past 24 hours have been reviewed.  Recent Lab Results         09/06/22  0959        Anion Gap 9       BUN 21       BUN/CREAT RATIO 23       Calcium 8.4       Chloride 100       CO2 32       Creatinine 0.92       eGFR 62       Glucose 100       Potassium 4.6       Sodium 136               Significant Imaging: I have reviewed all pertinent imaging results/findings within the past 24 hours.

## 2022-09-06 NOTE — PT/OT/SLP PROGRESS
Occupational Therapy   Treatment    Name: Rosa Alejo  MRN: [id number]  Admitting Diagnosis:  Hip fracture requiring operative repair       Recommendations:     Discharge Recommendations: home with home health  Discharge Equipment Recommendations:  walker, rolling  Barriers to discharge:       Assessment:     Rosa Alejo is a 82 y.o. female with a medical diagnosis of Hip fracture requiring operative repair.  She presents with decreased balance, strength and actvity tolerance. Performance deficits affecting function are weakness, impaired endurance, impaired functional mobility, impaired balance, impaired cognition, decreased safety awareness, impaired self care skills. Pt continues to have decreased energy and tolerance of therapy.    Rehab Prognosis:  Fair; patient would benefit from acute skilled OT services to address these deficits and reach maximum level of function.       Plan:     Patient to be seen 5 x/week to address the above listed problems via self-care/home management, therapeutic activities, therapeutic exercises, neuromuscular re-education  Plan of Care Expires:    Plan of Care Reviewed with: patient    Subjective     Pain/Comfort:  Pain Rating 1: 0/10  Location - Side 1: Left  Location - Orientation 1: lower    Objective:     Communicated with: Pt prior to session.  Patient found supine with sitter   upon OT entry to room.    General Precautions: Standard, fall   Orthopedic Precautions:LLE weight bearing as tolerated   Braces:    Respiratory Status: Room air     Occupational Performance:     Bed Mobility:    Patient completed Supine to Sit with contact guard assistance     Functional Mobility/Transfers:  Patient completed Sit <> Stand Transfer with minimum assistance  with  rolling walker   Patient completed Toilet Transfer Step Transfer technique with minimum assistance with  rolling walker  Functional Mobility: Pt completed functional ambulation down the hallway using the RW for balance and  safety    Activities of Daily Living:  Lower Body Dressing: minimum assistance using the reacher  Toileting: minimum assistance using the grab bars, cues, and safety      [facility] 6 Click ADL: 21    Treatment & Education:  Therapeutic exercise:  While sitting in the w/c the pt completed bicep curls 2 sets of 20 reps with 4# DB; chest press: 2 sets of 20 reps with 3# DB to increase BUE strength for ADLs and IADL    Therapeutic activity:  Pt [id number] mins on arm ergometer to increase endurance, BUE strength and activity tolerance for ADL performance    Patient left up in chair with all lines intact and call button in reach    GOALS:   Multidisciplinary Problems       Occupational Therapy Goals          Problem: Occupational Therapy    Goal Priority Disciplines Outcome Interventions   Occupational Therapy Goal     OT, PT/OT Ongoing, Progressing    Description: Goals to be met by: 09/24/22     Patient will increase functional independence with ADLs by performing:    UE Dressing with Modified [place].  LE Dressing with Supervision using a/e as needed  Grooming while standing at sink with Modified [place].  Toileting from toilet with Modified [place] and Supervision for hygiene and clothing management.   Standing x5-10 minutes with Supervision.  Step transfer with Modified [place] and Supervision  Toilet transfer to toilet with Modified [place] and Supervision.                         Time Tracking:     OT Date of Treatment:    OT Start Time:  10:30  OT Stop Time:  11:15  OT Total Time (min):      Billable Minutes:Self Care/Home Management 15  Therapeutic Activity 15  Therapeutic Exercise 15    OT/GIOVANNI: OT          9/6/2022

## 2022-09-06 NOTE — ASSESSMENT & PLAN NOTE
Staples removed on 9/5, steri strips applied  Monitor for signs of infection  Continue PT/OT rehabilitation

## 2022-09-06 NOTE — HOSPITAL COURSE
8/30/22---patient working with physical therapy on ambulation there is no complications this point the patient doing pretty good.    9/6: Patient sitting in chair with caregiver in room. She reports poor appetite but that is typical for her. She also reports dysuria and foul smelling urine. Case discussed with Dr Skinner via Telemedicine consultation. Potassium 4.6 which is down form 5.6 yesterday. Potassium 20 meq BID was held yesterday. She will be restarted on Potassium Chloride 10 meq BID. BUN/Creatinine 21/0.92 which is an increase from 13/0.65 on admission. We will encourage increasing PO fluid intake. Staples were removed from incision to left hip. Steri strips intact and there are no signs of infection. Patient had f/u with Dr Holloway, Ortho on 9/22. UA pend the    09/13/2022 patient doing better with physical therapy and occupational therapy she is eating well we added may gaze for appetite stimulator.  She has had no episodes of nausea vomiting diarrhea.    09/16/2022 discharge note the patient be discharged home with home health care she has done well with physical therapy and occupational therapy all the measures have admit to prevent falls and education.  She has had an appetite stimulant added which is Megase

## 2022-09-06 NOTE — PT/OT/SLP PROGRESS
Physical Therapy Treatment    Patient Name:  Rosa Alejo   MRN:  [id number]    Recommendations:     Discharge Recommendations:  home with home health   Discharge Equipment Recommendations: wheelchair   Barriers to discharge: None    Assessment:     Rosa Alejo is a 82 y.o. female admitted with a medical diagnosis of Left Hip fracture requiring operative repair.  She presents with the following impairments/functional limitations:  weakness, impaired endurance, gait instability, impaired balance, decreased safety awareness, impaired coordination.    Pt tolerated tx fair with ambulating up to 6ft using RW [place]/CGA with max encouragement to ambulate further but pt refused requesting to sit back down due to anxiety of parkinsonism symptoms present with functional mobility. Pt performed seated exercises well requiring no assistance with LLE demo good strength while sitting with back unsupported while improving sitting tolerance.     Rehab Prognosis: Fair; patient would benefit from acute skilled PT services to address these deficits and reach maximum level of function.    Recent Surgery: * No surgery found *      Plan:     During this hospitalization, patient to be seen 5 x/week to address the identified rehab impairments via gait training, therapeutic exercises and progress toward the following goals:    Plan of Care Expires:  09/22/22    Subjective     Chief Complaint: Upset stomach and generalized fatigue. Pt expressed anxiety over last several days with performing functional activity and reports being over stimulated with therapy at times.   Patient/Family Comments/goals: return back home with 24/7 sitters/family  Pain/Comfort:  Pain Rating 1: 0/10 (numbness in B feet)  Location - Side 1: Left  Location - Orientation 1: lower  Location 1: hip      Objective:     Communicated with patient prior to session. Patient found reclined in chair with sitter present upon PT entry to room.     General Precautions:  Standard, fall   Orthopedic Precautions:LLE weight bearing as tolerated   Braces:    Respiratory Status: Room air     Functional Mobility:  Sit to stand from chair Georgia/CGA  Stand pivot t/f from WC to chair in room: Georgia for initiation then CGA   Ambulation: 6 feet with RW [place] due to safety awareness vc on walker management      AM-PAC 6 CLICK MOBILITY  Turning over in bed (including adjusting bedclothes, sheets and blankets)?: 3  Sitting down on and standing up from a chair with arms (e.g., wheelchair, bedside commode, etc.): 3  Moving from lying on back to sitting on the side of the bed?: 3  Moving to and from a bed to a chair (including a wheelchair)?: 3  Need to walk in hospital room?: 2  Climbing 3-5 steps with a railing?: 1  Basic Mobility Total Score: 15       Therapeutic Activities and Exercises: to improve BLE strength, endurance, ROM, improve sitting and standing tolerance unsupported/supported, core stability, trunk strengthening    Seated ankle pumps, seated LAQS, seated marches 3 x 10 with rest breaks between each  Sit to stand using RW [place] for initiation then CGA   WC to chair t/f CGA vc on proper sequencing using RW      Patient left reclined in chair  B heels floating with pillows for comfort sitter present call button in reach.    GOALS:   Multidisciplinary Problems       Physical Therapy Goals          Problem: Physical Therapy    Goal Priority Disciplines Outcome Goal Variances Interventions   Physical Therapy Goal     PT, PT/OT Ongoing, Progressing     Description: Goals to be met by: 2 weeks     Patient will increase functional independence with mobility by performin. Supine to sit with MInimal Assistance. Goal Met.  2. Sit to supine with MInimal Assistance. Goal Met.  3. Rolling to Left and Right with Minimal Assistance. Goal Met.  4. Sit to stand transfer with Minimal Assistance.  5. Bed to chair transfer with Minimal Assistance using Rolling Walker  6. Gait  x 50 feet with Moderate  Assistance using Rolling Walker.     Goals to be met by: 4 weeks     Patient will increase functional independence with mobility by performin. Supine to sit with Stand-by Assistance  2. Sit to supine with Stand-by Assistance  3. Rolling to Left and Right with Stand-by Assistance.  4. Sit to stand transfer with Minimal Assistance  5. Bed to chair transfer with Contact Guard Assistance using Rolling Walker  6. Gait  x 150 feet with Minimal Assistance using Rolling Walker.                          Time Tracking:     PT Received On: 22  PT Start Time: 1335     PT Stop Time: 1405  PT Total Time (min): 30 min     Billable Minutes: Therapeutic Exercise 20 Gait training 10    Treatment Type: Treatment  PT/PTA: PTA     PTA Visit Number: 1     2022

## 2022-09-06 NOTE — NURSING
Pt on commode with SN roxanne at this time. Occupational therapy in pt room noted. Pt had loose brown BM noted in commode

## 2022-09-06 NOTE — PROGRESS NOTES
[facility] - Medical Surgical Unit  Hospital Medicine  Progress Note    Patient Name: Rosa Alejo  MRN: [id number]  Patient Class: IP- Swing   Admission Date: 8/23/2022  Length of Stay: 14 days  Attending Physician: Pérez Skinner DO  Primary Care Provider: Reza Murphy MD        Subjective:     Principal Problem:Hip fracture requiring operative repair        HPI:  Patient is an 82-year-old trans deferred from stand is after a fall and left hip fracture.  Is an intertrochanteric fracture.  The patient has a history of Parkinson's disease dementia and thyroid disease the infected me to the left breast and cataract surgery.  Left knee scope.  Pt in   for pt  and  ot      Overview/Hospital Course:  9/6: Patient sitting in chair with caregiver in room. She reports poor appetite but that is typical for her. She also reports dysuria and foul smelling urine. Case discussed with Dr Skinner via Telemedicine consultation. Potassium 4.6 which is down form 5.6 yesterday. Potassium 20 meq BID was held yesterday. She will be restarted on Potassium Chloride 10 meq BID. BUN/Creatinine 21/0.92 which is an increase from 13/0.65 on admission. We will encourage increasing PO fluid intake. Staples were removed from incision to left hip. Steri strips intact and there are no signs of infection. Patient had f/u with Fly Infante on 9/22. UA pending.      Interval History: Hyperkalemia resolved. Will start on Potassium Chloride 10 meq BID. Will obtain UA for c/o dysuria, frequency and foul smelling urine. Continue current rehabilitation plan until goals are met.    Review of Systems   Constitutional:  Positive for fatigue. Negative for chills and fever.   Respiratory:  Negative for shortness of breath.    Cardiovascular:  Negative for chest pain.   Gastrointestinal:  Negative for abdominal pain, nausea and vomiting.   Genitourinary:  Positive for dysuria and frequency.   Musculoskeletal:  Positive for arthralgias  and gait problem.   Skin:  Positive for wound (healed surgical wound to left hip, steris strips inatct, no signs of infection).   Neurological:  Positive for weakness (generalized).   Psychiatric/Behavioral:  Negative for confusion.    All other systems reviewed and are negative.  Objective:     Vital Signs (Most Recent):  Temp: 98.2 °F (36.8 °C) (09/06/22 0808)  Pulse: 73 (09/06/22 0808)  Resp: 18 (09/06/22 0808)  BP: (!) 112/57 (09/06/22 0808)  SpO2: 99 % (09/06/22 0808)   Vital Signs (24h Range):  Temp:  [98.1 °F (36.7 °C)-98.2 °F (36.8 °C)] 98.2 °F (36.8 °C)  Pulse:  [73-81] 73  Resp:  [18] 18  SpO2:  [97 %-99 %] 99 %  BP: ()/(50-57) 112/57     Weight: 45 kg (99 lb 4.8 oz)  Body mass index is 15.55 kg/m².  No intake or output data in the 24 hours ending 09/06/22 1402   Physical Exam  Vitals and nursing note reviewed.   Constitutional:       General: She is not in acute distress.     Appearance: Normal appearance.   HENT:      Mouth/Throat:      Mouth: Mucous membranes are moist.      Pharynx: Oropharynx is clear.   Eyes:      Pupils: Pupils are equal, round, and reactive to light.   Cardiovascular:      Rate and Rhythm: Normal rate and regular rhythm.   Pulmonary:      Effort: Pulmonary effort is normal.      Breath sounds: Normal breath sounds.   Abdominal:      General: Abdomen is flat. Bowel sounds are normal.      Tenderness: There is no abdominal tenderness. There is no right CVA tenderness or left CVA tenderness.   Musculoskeletal:      Right lower leg: No edema.      Left lower leg: No edema.      Comments: Left lower extremity weakness from recent hip repair   Skin:     General: Skin is warm and dry.      Comments: healed surgical wound to left hip, steris strips inatct, no signs of infection   Neurological:      General: No focal deficit present.      Mental Status: She is alert and oriented to person, place, and time.      Motor: Weakness (generalized) present.      Gait: Gait abnormal.        Significant Labs: All pertinent labs within the past 24 hours have been reviewed.  Recent Lab Results         09/06/22  0959        Anion Gap 9       BUN 21       BUN/CREAT RATIO 23       Calcium 8.4       Chloride 100       CO2 32       Creatinine 0.92       eGFR 62       Glucose 100       Potassium 4.6       Sodium 136               Significant Imaging: I have reviewed all pertinent imaging results/findings within the past 24 hours.      Assessment/Plan:      * Hip fracture requiring operative repair  Staples removed on 9/5, steri strips applied  Monitor for signs of infection  Continue PT/OT rehabilitation      Hyperkalemia  Decrease Potassium chloride to 10 meq BID  Weekly bmp      VTE Risk Mitigation (From admission, onward)         Ordered     enoxaparin injection 30 mg  Daily         08/29/22 1132     Place GRISEL hose  Until discontinued         08/26/22 1522                Discharge Planning   BRODERICK:      Code Status: Full Code   Is the patient medically ready for discharge?:     Reason for patient still in hospital (select all that apply): Patient trending condition, Laboratory test, Treatment and PT / OT recommendations  Discharge Plan A: Home with family, Home Health                  TOMAS Sanchez  Department of Hospital Medicine   [facility] - [facility] Surgical Unit

## 2022-09-06 NOTE — PLAN OF CARE
Pt. Has UA collected today and waiting on final urine culture. Continues to eat very poorly and pt. Is encouraged to eat and drinks fluids throughout the day. Pt. Has been working with Therapy and tires easily. Sitter is staying with pt. At all times. Will cont. To follow pt.

## 2022-09-06 NOTE — PLAN OF CARE
Problem: Adult Inpatient Plan of Care  Goal: Plan of Care Review  Outcome: Ongoing, Progressing  Goal: Patient-Specific Goal (Individualized)  Outcome: Ongoing, Progressing  Goal: Absence of Hospital-Acquired Illness or Injury  Outcome: Ongoing, Progressing  Goal: Optimal Comfort and Wellbeing  Outcome: Ongoing, Progressing  Goal: Readiness for Transition of Care  Outcome: Ongoing, Progressing     Problem: Fall Injury Risk  Goal: Absence of Fall and Fall-Related Injury  Outcome: Ongoing, Progressing     Problem: Pain Acute  Goal: Acceptable Pain Control and Functional Ability  Outcome: Ongoing, Progressing     Problem: Skin Injury Risk Increased  Goal: Skin Health and Integrity  Outcome: Ongoing, Progressing     Problem: Adjustment to Injury (Hip Fracture Medical Management)  Goal: Optimal Coping with Change in Health Status  Outcome: Ongoing, Progressing     Problem: Bleeding (Hip Fracture Medical Management)  Goal: Absence of Bleeding  Outcome: Ongoing, Progressing     Problem: Bowel Elimination Impaired (Hip Fracture Medical Management)  Goal: Effective Bowel Elimination  Outcome: Ongoing, Progressing     Problem: Cognitive Decline Risk (Hip Fracture Medical Management)  Goal: Baseline Cognitive Function Maintained  Outcome: Ongoing, Progressing     Problem: Embolism (Hip Fracture Medical Management)  Goal: Absence of Embolism  Outcome: Ongoing, Progressing     Problem: Fracture Stabilization and Management (Hip Fracture Medical Management)  Goal: Fracture Stability  Outcome: Ongoing, Progressing     Problem: Functional Ability Impaired (Hip Fracture Medical Management)  Goal: Optimal Functional Performance  Outcome: Ongoing, Progressing     Problem: Pain (Hip Fracture Medical Management)  Goal: Acceptable Pain Level  Outcome: Ongoing, Progressing     Problem: Urinary Elimination Impaired (Hip Fracture Medical Management)  Goal: Effective Urinary Elimination  Outcome: Ongoing, Progressing     Problem: Impaired  Wound Healing  Goal: Optimal Wound Healing  Outcome: Ongoing, Progressing

## 2022-09-07 PROCEDURE — 97116 GAIT TRAINING THERAPY: CPT | Mod: CQ

## 2022-09-07 PROCEDURE — 97110 THERAPEUTIC EXERCISES: CPT | Mod: CQ

## 2022-09-07 PROCEDURE — 25000003 PHARM REV CODE 250: Performed by: FAMILY MEDICINE

## 2022-09-07 PROCEDURE — 97110 THERAPEUTIC EXERCISES: CPT

## 2022-09-07 PROCEDURE — 97530 THERAPEUTIC ACTIVITIES: CPT

## 2022-09-07 PROCEDURE — 97535 SELF CARE MNGMENT TRAINING: CPT

## 2022-09-07 PROCEDURE — 63600175 PHARM REV CODE 636 W HCPCS: Performed by: FAMILY MEDICINE

## 2022-09-07 PROCEDURE — 11000004 HC SNF PRIVATE

## 2022-09-07 PROCEDURE — 27000941

## 2022-09-07 PROCEDURE — 25000003 PHARM REV CODE 250: Performed by: NURSE PRACTITIONER

## 2022-09-07 RX ADMIN — Medication 2000 UNITS: at 09:09

## 2022-09-07 RX ADMIN — NYSTATIN 500000 UNITS: 500000 SUSPENSION ORAL at 08:09

## 2022-09-07 RX ADMIN — Medication 1 TABLET: at 09:09

## 2022-09-07 RX ADMIN — PANTOPRAZOLE SODIUM 20 MG: 20 TABLET, DELAYED RELEASE ORAL at 09:09

## 2022-09-07 RX ADMIN — QUETIAPINE FUMARATE 12.5 MG: 25 TABLET, FILM COATED ORAL at 08:09

## 2022-09-07 RX ADMIN — NYSTATIN 500000 UNITS: 500000 SUSPENSION ORAL at 09:09

## 2022-09-07 RX ADMIN — LEVOTHYROXINE SODIUM 88 MCG: 0.09 TABLET ORAL at 06:09

## 2022-09-07 RX ADMIN — ALUMINUM HYDROXIDE, MAGNESIUM HYDROXIDE, AND SIMETHICONE 30 ML: 200; 200; 20 SUSPENSION ORAL at 06:09

## 2022-09-07 RX ADMIN — CETIRIZINE HYDROCHLORIDE 10 MG: 10 TABLET, FILM COATED ORAL at 09:09

## 2022-09-07 RX ADMIN — POTASSIUM CHLORIDE 10 MEQ: 750 TABLET, EXTENDED RELEASE ORAL at 08:09

## 2022-09-07 RX ADMIN — ALUMINUM HYDROXIDE, MAGNESIUM HYDROXIDE, AND SIMETHICONE 30 ML: 200; 200; 20 SUSPENSION ORAL at 11:09

## 2022-09-07 RX ADMIN — ENOXAPARIN SODIUM 30 MG: 100 INJECTION SUBCUTANEOUS at 04:09

## 2022-09-07 RX ADMIN — CARBIDOPA AND LEVODOPA 1 TABLET: 25; 100 TABLET ORAL at 04:09

## 2022-09-07 RX ADMIN — ASPIRIN 325 MG ORAL TABLET 325 MG: 325 PILL ORAL at 09:09

## 2022-09-07 RX ADMIN — CARBIDOPA AND LEVODOPA 1 TABLET: 25; 100 TABLET ORAL at 09:09

## 2022-09-07 RX ADMIN — POTASSIUM CHLORIDE 10 MEQ: 750 TABLET, EXTENDED RELEASE ORAL at 09:09

## 2022-09-07 RX ADMIN — ALUMINUM HYDROXIDE, MAGNESIUM HYDROXIDE, AND SIMETHICONE 30 ML: 200; 200; 20 SUSPENSION ORAL at 08:09

## 2022-09-07 RX ADMIN — CARBIDOPA AND LEVODOPA 1 TABLET: 25; 100 TABLET ORAL at 06:09

## 2022-09-07 RX ADMIN — VENLAFAXINE HYDROCHLORIDE 75 MG: 75 CAPSULE, EXTENDED RELEASE ORAL at 09:09

## 2022-09-07 RX ADMIN — ALUMINUM HYDROXIDE, MAGNESIUM HYDROXIDE, AND SIMETHICONE 30 ML: 200; 200; 20 SUSPENSION ORAL at 04:09

## 2022-09-07 RX ADMIN — CARBIDOPA AND LEVODOPA 1 TABLET: 25; 100 TABLET ORAL at 11:09

## 2022-09-07 NOTE — PT/OT/SLP PROGRESS
Occupational Therapy   Treatment    Name: Rosa Alejo  MRN: [id number]  Admitting Diagnosis:  Hip fracture requiring operative repair       Recommendations:     Discharge Recommendations: home with home health  Discharge Equipment Recommendations:  walker, rolling  Barriers to discharge:       Assessment:     Rosa Alejo is a 82 y.o. female with a medical diagnosis of Hip fracture requiring operative repair.  She presents with decreased balance, strength and actvity tolerance. Performance deficits affecting function are weakness, impaired endurance, impaired functional mobility, impaired balance, impaired cognition, decreased safety awareness, impaired self care skills. Pt continues to have decreased energy and tolerance of therapy. Pt had better participation on today but continues to requires cues for safety, device management and balance.     Rehab Prognosis:  Fair; patient would benefit from acute skilled OT services to address these deficits and reach maximum level of function.       Plan:     Patient to be seen 5 x/week to address the above listed problems via self-care/home management, therapeutic activities, therapeutic exercises, neuromuscular re-education  Plan of Care Expires:    Plan of Care Reviewed with: patient    Subjective     Pain/Comfort:  Pain Rating 1: 0/10  Location - Side 1: Left  Location - Orientation 1: lower    Objective:     Communicated with: Pt prior to session.  Patient found supine with sitter   upon OT entry to room.    General Precautions: Standard, fall   Orthopedic Precautions:LLE weight bearing as tolerated   Braces:    Respiratory Status: Room air     Occupational Performance:     Bed Mobility:    Patient completed Supine to Sit with min assistance     Functional Mobility/Transfers:  Patient completed Sit <> Stand Transfer with minimum assistance  with  rolling walker   Functional Mobility: Pt completed functional ambulation down the hallway using the RW for balance and  safety requiring Min A.    Activities of Daily Living:  Sit to stand t/f with CGA with cues to push up from the chair    AMPAC 6 Click ADL: 21    Treatment & Education:  Therapeutic exercise:  While sitting in the w/c the pt completed bicep curls 2 sets of 20 reps with 4# DB; to increase BUE strength for ADLs and IADL    Therapeutic activity:  While sitting unsupported on the mat, Pt [id number] mins on arm ergometer to increase endurance, BUE strength and activity tolerance for ADL performance    Patient left up in chair with all lines intact and call button in reach    GOALS:   Multidisciplinary Problems       Occupational Therapy Goals          Problem: Occupational Therapy    Goal Priority Disciplines Outcome Interventions   Occupational Therapy Goal     OT, PT/OT Ongoing, Progressing    Description: Goals to be met by: 09/24/22     Patient will increase functional independence with ADLs by performing:    UE Dressing with Modified [place].  LE Dressing with Supervision using a/e as needed  Grooming while standing at sink with Modified [place].  Toileting from toilet with Modified [place] and Supervision for hygiene and clothing management.   Standing x5-10 minutes with Supervision.  Step transfer with Modified [place] and Supervision  Toilet transfer to toilet with Modified [place] and Supervision.                         Time Tracking:     OT Date of Treatment:    OT Start Time:  1:15  OT Stop Time:  1:50  OT Total Time (min):      Billable Minutes:Self Care/Home Management 15  Therapeutic Activity 15  Therapeutic Exercise 15    OT/GIOVANNI: OT          9/7/2022

## 2022-09-07 NOTE — PLAN OF CARE
[facility] - Medical Surgical Unit - Swing Bed   Interdisciplinary Team Meeting    Patient: Rosa Alejo   Today's Date: 9/7/2022   Estimated D/C Date: 9/22/22        Physician:Pérez Skinner D.O. Nurse Practitioner:    [facility] Director: MONICA Ray   : Trice Paredes RN Physical/Occupational Therapy: Ainsley Marin OT   Speech Therapy: [facility] Corazon    Nursing: Neema Velazquez RN  Respiratory: Marry Frederick, Resp. Dietary: Yariel Morales, Dietary   Other:      Nurse  New Symptoms/Problems: pt. Has poor appetite & now having diarrhea  Last BM: 9/6/22 Urine: incontinent Diarrhea: Yes   Constipated: No Bowel: incontinent Peñaloza: No   Isolation: No D/C Date:  Date:    O2 Device: Room Air O2 Flow:   SpO2: 98 %   Nutrition: Regular, Ensure Enlive  Speech/Swallowing: No current speech or swallowing issues  Aspiration Precautions: No  Cognition: Memory issues    Physical Therapy  Physical Therapy/Gait: ambulated 6 ft with RW & Min A ELOS: Plan to DC     Transfers: Minimal Assistance Range of Motion/Restrictions: LLE with WBAT     Occupational Therapy  Eating/Grooming: Supervision or Set-up Assistance Toileting: Minimal Assistance   Bathing: Minimal Assistance Dressing (Upper Body): Contact Guard Assistance   Dressing (Lower Body): Minimal Assistance        Tx Plan/Recommendations reviewed with family and/or patient on (date) 9/6/22.  Additional family Conference/Training:   D/C Plan/Recommendations: Home with HH  BRODERICK: 9/22/22    Pharmacy  Medication Changes (see MD orders in chart): No  MD:Pérez Skinner D.O. Labs Reviewed: Yes New Lab Orders: Yes   MD/NP Signature:

## 2022-09-07 NOTE — PT/OT/SLP PROGRESS
Physical Therapy Treatment    Patient Name:  Rosa Alejo   MRN:  [id number]    Recommendations:     Discharge Recommendations:  home with home health   Discharge Equipment Recommendations: wheelchair   Barriers to discharge: None    Assessment:     Rosa Alejo is a 82 y.o. female admitted with a medical diagnosis of Left Hip fracture requiring operative repair.  She presents with the following impairments/functional limitations:  weakness, impaired endurance, gait instability, decreased safety awareness, pain, impaired coordination.    Pt tolerated tx well with mild c/o left hip pain and abdominal aching throughout Skilled PT. Pt able to increasing ambulating from 6ft to 30ft using RW still requiring [place] vc on walker management with shuffle gait present. Pt demo good sitting unsupported balance/tolerance while performing seated exercise with BLES.     Rehab Prognosis: Fair; patient would benefit from acute skilled PT services to address these deficits and reach maximum level of function.    Recent Surgery: * No surgery found *      Plan:     During this hospitalization, patient to be seen 5 x/week to address the identified rehab impairments via gait training, therapeutic exercises and progress toward the following goals:    Plan of Care Expires:  09/22/22    Subjective     Chief Complaint: Upset stomach and generalized fatigue. Pt expressed anxiety over last several days with performing functional activity and reports being over stimulated with therapy at times.   Patient/Family Comments/goals: return back home with 24/7 sitters/family  Pain/Comfort:  Pain Rating 1: 5/10  Location - Side 1: Left  Location - Orientation 1: lower  Location 1: hip  Pain Addressed 1: Distraction      Objective:     Communicated with patient prior to session. Patient found seated on EOM with OT present upon PT entry to room.     General Precautions: Standard, fall   Orthopedic Precautions:LLE weight bearing as tolerated   Braces:     Respiratory Status: Room air     Functional Mobility:  Sit to stand from chair [place]/CGA  Stand pivot t/f from WC to chair in room: [place] for initiation then CGA   Ambulation: 30 ft using RW [place] due to safety awareness vc on walker management with shuffle gait      AM-PAC 6 CLICK MOBILITY  Turning over in bed (including adjusting bedclothes, sheets and blankets)?: 3  Sitting down on and standing up from a chair with arms (e.g., wheelchair, bedside commode, etc.): 3  Moving from lying on back to sitting on the side of the bed?: 3  Moving to and from a bed to a chair (including a wheelchair)?: 3  Need to walk in hospital room?: 2  Climbing 3-5 steps with a railing?: 1  Basic Mobility Total Score: 15       Therapeutic Activities and Exercises: to improve BLE strength, endurance, ROM, improve sitting and standing tolerance unsupported/supported, core stability, trunk strengthening    Seated on EOM unsupported while performing the following:  Seated LAQS 2 x 10, HS curls 2 x 10 gr tband   Standing using RW marches 2 x 10 CGA  WC to toliet t/f CGA vc on proper sequencing using hand rails for support       Patient left seated on commode with sitter present and nursing notified instructing pt to pull nurse bell when finished.     GOALS:   Multidisciplinary Problems       Physical Therapy Goals          Problem: Physical Therapy    Goal Priority Disciplines Outcome Goal Variances Interventions   Physical Therapy Goal     PT, PT/OT Ongoing, Progressing     Description: Goals to be met by: 2 weeks     Patient will increase functional independence with mobility by performin. Supine to sit with MInimal Assistance. Goal Met.  2. Sit to supine with MInimal Assistance. Goal Met.  3. Rolling to Left and Right with Minimal Assistance. Goal Met.  4. Sit to stand transfer with Minimal Assistance.  5. Bed to chair transfer with Minimal Assistance using Rolling Walker  6. Gait  x 50 feet with Moderate Assistance using Rolling  Walker.     Goals to be met by: 4 weeks     Patient will increase functional independence with mobility by performin. Supine to sit with Stand-by Assistance  2. Sit to supine with Stand-by Assistance  3. Rolling to Left and Right with Stand-by Assistance.  4. Sit to stand transfer with Minimal Assistance  5. Bed to chair transfer with Contact Guard Assistance using Rolling Walker  6. Gait  x 150 feet with Minimal Assistance using Rolling Walker.                          Time Tracking:     PT Received On: 22  PT Start Time: 1400     PT Stop Time: 1423  PT Total Time (min): 23 min     Billable Minutes: Therapeutic Exercise 13 Gait training 10    Treatment Type: Treatment  PT/PTA: PTA     PTA Visit Number: 2     2022

## 2022-09-08 PROCEDURE — 25000003 PHARM REV CODE 250: Performed by: NURSE PRACTITIONER

## 2022-09-08 PROCEDURE — 97110 THERAPEUTIC EXERCISES: CPT

## 2022-09-08 PROCEDURE — 11000004 HC SNF PRIVATE

## 2022-09-08 PROCEDURE — 63600175 PHARM REV CODE 636 W HCPCS: Performed by: FAMILY MEDICINE

## 2022-09-08 PROCEDURE — 27000941

## 2022-09-08 PROCEDURE — 97530 THERAPEUTIC ACTIVITIES: CPT

## 2022-09-08 PROCEDURE — 25000003 PHARM REV CODE 250: Performed by: FAMILY MEDICINE

## 2022-09-08 PROCEDURE — S0179 MEGESTROL 20 MG: HCPCS | Performed by: FAMILY MEDICINE

## 2022-09-08 RX ORDER — GLUCOSAM/CHONDRO/HERB 149/HYAL 750-100 MG
1 TABLET ORAL DAILY
Status: DISCONTINUED | OUTPATIENT
Start: 2022-09-09 | End: 2022-09-15

## 2022-09-08 RX ORDER — MEGESTROL ACETATE 40 MG/ML
200 SUSPENSION ORAL DAILY
Status: DISCONTINUED | OUTPATIENT
Start: 2022-09-08 | End: 2022-09-14 | Stop reason: SDUPTHER

## 2022-09-08 RX ADMIN — ASPIRIN 325 MG ORAL TABLET 325 MG: 325 PILL ORAL at 09:09

## 2022-09-08 RX ADMIN — NYSTATIN 500000 UNITS: 500000 SUSPENSION ORAL at 04:09

## 2022-09-08 RX ADMIN — POTASSIUM CHLORIDE 10 MEQ: 750 TABLET, EXTENDED RELEASE ORAL at 08:09

## 2022-09-08 RX ADMIN — NYSTATIN 500000 UNITS: 500000 SUSPENSION ORAL at 08:09

## 2022-09-08 RX ADMIN — NYSTATIN 500000 UNITS: 500000 SUSPENSION ORAL at 01:09

## 2022-09-08 RX ADMIN — MEGESTROL ACETATE 200 MG: 40 SUSPENSION ORAL at 04:09

## 2022-09-08 RX ADMIN — Medication 1 TABLET: at 09:09

## 2022-09-08 RX ADMIN — PANTOPRAZOLE SODIUM 20 MG: 20 TABLET, DELAYED RELEASE ORAL at 09:09

## 2022-09-08 RX ADMIN — CARBIDOPA AND LEVODOPA 1 TABLET: 25; 100 TABLET ORAL at 09:09

## 2022-09-08 RX ADMIN — NYSTATIN 500000 UNITS: 500000 SUSPENSION ORAL at 09:09

## 2022-09-08 RX ADMIN — ALUMINUM HYDROXIDE, MAGNESIUM HYDROXIDE, AND SIMETHICONE 30 ML: 200; 200; 20 SUSPENSION ORAL at 08:09

## 2022-09-08 RX ADMIN — Medication 2000 UNITS: at 09:09

## 2022-09-08 RX ADMIN — CARBIDOPA AND LEVODOPA 1 TABLET: 25; 100 TABLET ORAL at 01:09

## 2022-09-08 RX ADMIN — POTASSIUM CHLORIDE 10 MEQ: 750 TABLET, EXTENDED RELEASE ORAL at 09:09

## 2022-09-08 RX ADMIN — QUETIAPINE FUMARATE 12.5 MG: 25 TABLET, FILM COATED ORAL at 08:09

## 2022-09-08 RX ADMIN — TRAMADOL HYDROCHLORIDE 50 MG: 50 TABLET, COATED ORAL at 10:09

## 2022-09-08 RX ADMIN — ALUMINUM HYDROXIDE, MAGNESIUM HYDROXIDE, AND SIMETHICONE 30 ML: 200; 200; 20 SUSPENSION ORAL at 04:09

## 2022-09-08 RX ADMIN — LEVOTHYROXINE SODIUM 88 MCG: 0.09 TABLET ORAL at 05:09

## 2022-09-08 RX ADMIN — VENLAFAXINE HYDROCHLORIDE 75 MG: 75 CAPSULE, EXTENDED RELEASE ORAL at 09:09

## 2022-09-08 RX ADMIN — ALUMINUM HYDROXIDE, MAGNESIUM HYDROXIDE, AND SIMETHICONE 30 ML: 200; 200; 20 SUSPENSION ORAL at 10:09

## 2022-09-08 RX ADMIN — ENOXAPARIN SODIUM 30 MG: 100 INJECTION SUBCUTANEOUS at 04:09

## 2022-09-08 RX ADMIN — ALUMINUM HYDROXIDE, MAGNESIUM HYDROXIDE, AND SIMETHICONE 30 ML: 200; 200; 20 SUSPENSION ORAL at 05:09

## 2022-09-08 RX ADMIN — CARBIDOPA AND LEVODOPA 1 TABLET: 25; 100 TABLET ORAL at 04:09

## 2022-09-08 RX ADMIN — CARBIDOPA AND LEVODOPA 1 TABLET: 25; 100 TABLET ORAL at 06:09

## 2022-09-08 RX ADMIN — CETIRIZINE HYDROCHLORIDE 10 MG: 10 TABLET, FILM COATED ORAL at 09:09

## 2022-09-08 RX ADMIN — DONEPEZIL HYDROCHLORIDE 10 MG: 5 TABLET, FILM COATED ORAL at 09:09

## 2022-09-08 NOTE — PLAN OF CARE
Problem: Adjustment to Injury (Hip Fracture Medical Management)  Goal: Optimal Coping with Change in Health Status  Intervention: Support Psychosocial Response to Injury  Flowsheets (Taken 9/8/2022 0318)  Family/Support System Care: support provided     Problem: Cognitive Decline Risk (Hip Fracture Medical Management)  Goal: Baseline Cognitive Function Maintained  Intervention: Maximize Cognitive Function  Flowsheets (Taken 9/8/2022 0318)  Environment Familiarity/Consistency: daily routine followed  Sleep/Rest Enhancement: room darkened     Problem: Functional Ability Impaired (Hip Fracture Medical Management)  Goal: Optimal Functional Performance  Intervention: Promote Optimal Functional Status  Flowsheets (Taken 9/8/2022 0318)  Self-Care Promotion: independence encouraged  Activity Management: Rolling - L1  Range of Motion: active ROM (range of motion) encouraged

## 2022-09-08 NOTE — PROGRESS NOTES
Skilled PT attempted by PTA but pt refused x 2 reporting she did not feel well and wanted to wait until tomorrow to work with PT. Pt also notified nursing she did not want to participate. Nursing notified Therapy Department.

## 2022-09-08 NOTE — PT/OT/SLP PROGRESS
Occupational Therapy   Treatment    Name: Rosa Alejo  MRN: [id number]  Admitting Diagnosis:  Hip fracture requiring operative repair       Recommendations:     Discharge Recommendations: home with home health  Discharge Equipment Recommendations:  walker, rolling  Barriers to discharge:       Assessment:     Rosa Alejo is a 82 y.o. female with a medical diagnosis of Hip fracture requiring operative repair.  She presents with decreased balance, strength and actvity tolerance. Performance deficits affecting function are weakness, impaired endurance, impaired functional mobility, impaired balance, impaired cognition, decreased safety awareness, impaired self care skills.  Pt reports eating on this morning. Pt had fair participation in therapy but she required Mod cues to participate due decreased confidence. Pt was educated by OT on the importance of participation and effort. Pt will need continued support of sitters at home.       Rehab Prognosis:  Fair; patient would benefit from acute skilled OT services to address these deficits and reach maximum level of function.       Plan:     Patient to be seen 5 x/week to address the above listed problems via self-care/home management, therapeutic activities, therapeutic exercises, neuromuscular re-education  Plan of Care Expires:    Plan of Care Reviewed with: patient    Subjective     Pain/Comfort:  Pain Rating 1: 6/10  Location - Side 1: Left  Location - Orientation 1: lower    Objective:     Communicated with: Pt prior to session.  Patient found supine with sitter   upon OT entry to room.    General Precautions: Standard, fall   Orthopedic Precautions:LLE weight bearing as tolerated   Braces:    Respiratory Status: Room air     Occupational Performance:     Bed Mobility:    Patient completed Supine to Sit with min assistance     Functional Mobility/Transfers:  Patient completed Sit <> Stand Transfer with CGA  with  rolling walker   Functional Mobility: Pt  completed functional ambulation down the hallway using the RW for balance and safety requiring Min A. Pt will continued    Activities of Daily Living:  Sit to stand t/f with CGA with cues to push up from the chair  Toileting and toilet t/f with CGA-SVN  LE dressing with Min A using the reacher due to inability to start pants over feet.    [facility] 6 Click ADL: 21    Treatment & Education:  Therapeutic exercise:  While sitting in the w/c the pt completed bicep curls 2 sets of 20 reps with 4# DB; to increase BUE strength for ADLs and IADL    Therapeutic activity:  While sitting unsupported on the mat, Pt [id number] mins on arm ergometer to increase endurance, BUE strength and activity tolerance for ADL performance    Pt stood to place pegs from peg board on the wall to increase standing tolerance, dynamic balance and endurance x 5mins; Pt terminated this activity after short participation; stating she can't do it.    Patient left up in chair with all lines intact and call button in reach    GOALS:   Multidisciplinary Problems       Occupational Therapy Goals          Problem: Occupational Therapy    Goal Priority Disciplines Outcome Interventions   Occupational Therapy Goal     OT, PT/OT Ongoing, Progressing    Description: Goals to be met by: 09/24/22     Patient will increase functional independence with ADLs by performing:    UE Dressing with Modified [place].  LE Dressing with Supervision using a/e as needed  Grooming while standing at sink with Modified [place].  Toileting from toilet with Modified [place] and Supervision for hygiene and clothing management.   Standing x5-10 minutes with Supervision.  Step transfer with Modified [place] and Supervision  Toilet transfer to toilet with Modified [place] and Supervision.                         Time Tracking:     OT Date of Treatment:    OT Start Time:  10:40  OT Stop Time:  11:40  OT Total Time (min):      Billable Minutes:Self Care/Home  Management 25  Therapeutic Activity 15  Therapeutic Exercise 15    OT/GIOVANNI: OT          9/8/2022

## 2022-09-08 NOTE — PLAN OF CARE
"Pt. Still tires easily but has been able to work with PT/OT services this week and make some progress overall. Has sitter that stays with her at all times. Has been having episodes of diarrhea also. Pt. Encouraged to eat and drink as much as she can to help with her nutrition and to keep her strength up. Voiced understanding but states, "Ya'll give us too much food to eat." Will follow.  "

## 2022-09-09 LAB
UA COMPLETE W REFLEX CULTURE PNL UR: ABNORMAL
UA COMPLETE W REFLEX CULTURE PNL UR: ABNORMAL

## 2022-09-09 PROCEDURE — 25000003 PHARM REV CODE 250: Performed by: NURSE PRACTITIONER

## 2022-09-09 PROCEDURE — 27000941

## 2022-09-09 PROCEDURE — 97116 GAIT TRAINING THERAPY: CPT | Mod: CQ

## 2022-09-09 PROCEDURE — 97110 THERAPEUTIC EXERCISES: CPT | Mod: CQ

## 2022-09-09 PROCEDURE — 63600175 PHARM REV CODE 636 W HCPCS: Performed by: FAMILY MEDICINE

## 2022-09-09 PROCEDURE — 11000004 HC SNF PRIVATE

## 2022-09-09 PROCEDURE — 97110 THERAPEUTIC EXERCISES: CPT

## 2022-09-09 PROCEDURE — 97530 THERAPEUTIC ACTIVITIES: CPT | Mod: CQ

## 2022-09-09 PROCEDURE — S0179 MEGESTROL 20 MG: HCPCS | Performed by: FAMILY MEDICINE

## 2022-09-09 PROCEDURE — 25000003 PHARM REV CODE 250: Performed by: FAMILY MEDICINE

## 2022-09-09 PROCEDURE — 97535 SELF CARE MNGMENT TRAINING: CPT

## 2022-09-09 PROCEDURE — 97530 THERAPEUTIC ACTIVITIES: CPT

## 2022-09-09 RX ORDER — NITROFURANTOIN 25; 75 MG/1; MG/1
100 CAPSULE ORAL EVERY 12 HOURS
Status: DISCONTINUED | OUTPATIENT
Start: 2022-09-09 | End: 2022-09-09 | Stop reason: CLARIF

## 2022-09-09 RX ORDER — NITROFURANTOIN 25; 75 MG/1; MG/1
100 CAPSULE ORAL EVERY 12 HOURS
Status: DISPENSED | OUTPATIENT
Start: 2022-09-09 | End: 2022-09-16

## 2022-09-09 RX ADMIN — POTASSIUM CHLORIDE 10 MEQ: 750 TABLET, EXTENDED RELEASE ORAL at 08:09

## 2022-09-09 RX ADMIN — VENLAFAXINE HYDROCHLORIDE 75 MG: 75 CAPSULE, EXTENDED RELEASE ORAL at 08:09

## 2022-09-09 RX ADMIN — NYSTATIN 500000 UNITS: 500000 SUSPENSION ORAL at 05:09

## 2022-09-09 RX ADMIN — ALUMINUM HYDROXIDE, MAGNESIUM HYDROXIDE, AND SIMETHICONE 30 ML: 200; 200; 20 SUSPENSION ORAL at 05:09

## 2022-09-09 RX ADMIN — OMEGA-3 FATTY ACIDS CAP 1000 MG 1 CAPSULE: 1000 CAP at 08:09

## 2022-09-09 RX ADMIN — PANTOPRAZOLE SODIUM 20 MG: 20 TABLET, DELAYED RELEASE ORAL at 08:09

## 2022-09-09 RX ADMIN — ENOXAPARIN SODIUM 30 MG: 100 INJECTION SUBCUTANEOUS at 05:09

## 2022-09-09 RX ADMIN — ALUMINUM HYDROXIDE, MAGNESIUM HYDROXIDE, AND SIMETHICONE 30 ML: 200; 200; 20 SUSPENSION ORAL at 08:09

## 2022-09-09 RX ADMIN — CARBIDOPA AND LEVODOPA 1 TABLET: 25; 100 TABLET ORAL at 08:09

## 2022-09-09 RX ADMIN — DONEPEZIL HYDROCHLORIDE 10 MG: 5 TABLET, FILM COATED ORAL at 08:09

## 2022-09-09 RX ADMIN — CARBIDOPA AND LEVODOPA 1 TABLET: 25; 100 TABLET ORAL at 05:09

## 2022-09-09 RX ADMIN — NYSTATIN 500000 UNITS: 500000 SUSPENSION ORAL at 01:09

## 2022-09-09 RX ADMIN — NYSTATIN 500000 UNITS: 500000 SUSPENSION ORAL at 08:09

## 2022-09-09 RX ADMIN — NITROFURANTOIN MONOHYDRATE/MACROCRYSTALS 100 MG: 75; 25 CAPSULE ORAL at 10:09

## 2022-09-09 RX ADMIN — ALUMINUM HYDROXIDE, MAGNESIUM HYDROXIDE, AND SIMETHICONE 30 ML: 200; 200; 20 SUSPENSION ORAL at 11:09

## 2022-09-09 RX ADMIN — NITROFURANTOIN MONOHYDRATE/MACROCRYSTALS 100 MG: 75; 25 CAPSULE ORAL at 02:09

## 2022-09-09 RX ADMIN — CARBIDOPA AND LEVODOPA 1 TABLET: 25; 100 TABLET ORAL at 11:09

## 2022-09-09 RX ADMIN — MEGESTROL ACETATE 200 MG: 40 SUSPENSION ORAL at 08:09

## 2022-09-09 RX ADMIN — LINACLOTIDE 72 MCG: 72 CAPSULE, GELATIN COATED ORAL at 05:09

## 2022-09-09 RX ADMIN — DOCUSATE SODIUM 100 MG: 100 CAPSULE, LIQUID FILLED ORAL at 08:09

## 2022-09-09 RX ADMIN — ASPIRIN 325 MG ORAL TABLET 325 MG: 325 PILL ORAL at 08:09

## 2022-09-09 RX ADMIN — LEVOTHYROXINE SODIUM 88 MCG: 0.09 TABLET ORAL at 05:09

## 2022-09-09 RX ADMIN — QUETIAPINE FUMARATE 12.5 MG: 25 TABLET, FILM COATED ORAL at 08:09

## 2022-09-09 RX ADMIN — ACETAMINOPHEN 325MG 325 MG: 325 TABLET ORAL at 10:09

## 2022-09-09 RX ADMIN — Medication 2000 UNITS: at 02:09

## 2022-09-09 RX ADMIN — Medication 1 TABLET: at 08:09

## 2022-09-09 RX ADMIN — CARBIDOPA AND LEVODOPA 1 TABLET: 25; 100 TABLET ORAL at 07:09

## 2022-09-09 RX ADMIN — CETIRIZINE HYDROCHLORIDE 10 MG: 10 TABLET, FILM COATED ORAL at 08:09

## 2022-09-09 NOTE — PLAN OF CARE
Problem: Fall Injury Risk  Goal: Absence of Fall and Fall-Related Injury  Intervention: Identify and Manage Contributors  Flowsheets (Taken 9/9/2022 0307)  Self-Care Promotion: independence encouraged  Medication Review/Management: medications reviewed  Intervention: Promote Injury-Free Environment  Flowsheets (Taken 9/9/2022 0307)  Safety Promotion/Fall Prevention:   assistive device/personal item within reach   nonskid shoes/socks when out of bed   side rails raised x 3   instructed to call staff for mobility   other (see comments)     Problem: Pain Acute  Goal: Acceptable Pain Control and Functional Ability  Intervention: Develop Pain Management Plan  Flowsheets (Taken 9/9/2022 0307)  Pain Management Interventions:   quiet environment facilitated   pillow support provided  Intervention: Prevent or Manage Pain  Flowsheets (Taken 9/9/2022 0307)  Sleep/Rest Enhancement: awakenings minimized  Medication Review/Management: medications reviewed

## 2022-09-09 NOTE — PT/OT/SLP PROGRESS
Occupational Therapy   Treatment    Name: Rosa Alejo  MRN: [id number]  Admitting Diagnosis:  Hip fracture requiring operative repair       Recommendations:     Discharge Recommendations: home with home health  Discharge Equipment Recommendations:  walker, rolling  Barriers to discharge:       Assessment:     Rosa Alejo is a 82 y.o. female with a medical diagnosis of Hip fracture requiring operative repair.  She presents with decreased balance, strength and actvity tolerance. Performance deficits affecting function are weakness, impaired endurance, impaired functional mobility, impaired balance, impaired cognition, decreased safety awareness, impaired self care skills. Pt tolerated skilled OT well and was given therapeutic rest breaks as needed. Pt participated well in skilled OT and had good effort in therapy on today. Pt peformed better on today. Pt reports better performance in evening however she also reported eating better on today.     Rehab Prognosis:  Fair; patient would benefit from acute skilled OT services to address these deficits and reach maximum level of function.       Plan:     Patient to be seen 5 x/week to address the above listed problems via self-care/home management, therapeutic activities, therapeutic exercises, neuromuscular re-education  Plan of Care Expires:    Plan of Care Reviewed with: patient    Subjective     Pain/Comfort:  Pain Rating 1: 6/10  Location - Side 1: Left  Location - Orientation 1: lower    Objective:     Communicated with: Pt prior to session.  Patient found supine with sitter   upon OT entry to room.    General Precautions: Standard, fall   Orthopedic Precautions:LLE weight bearing as tolerated   Braces:    Respiratory Status: Room air     Occupational Performance:     Bed Mobility:    Patient completed Supine to Sit with min assistance     Functional Mobility/Transfers:  Patient completed Sit <> Stand Transfer with CGA  with  rolling walker   Functional Mobility:  Pt completed functional ambulation down the hallway using the RW for balance and safety requiring Min A. Pt will continued    Activities of Daily Living:  Sit to stand t/f with Mod I with cues to push up from the chair  Supine to sit t/f with SVN-Mod I  LE dressing with Min A using the reacher due to inability to start pants over feet.    [facility] 6 Click ADL: 21    Treatment & Education:  While sitting unsupported on the mat, Pt [id number] mins on arm ergometer to increase endurance, BUE strength and activity tolerance for ADL performance    Therapeutic activity:  While standing the pt reached to place cards on a Velcro board to increase BUE ROM, functional reaching, standing endurance and dynamic balance to increase independence with ADLs.  Patient left up in chair with all lines intact and call button in reach    GOALS:   Multidisciplinary Problems       Occupational Therapy Goals          Problem: Occupational Therapy    Goal Priority Disciplines Outcome Interventions   Occupational Therapy Goal     OT, PT/OT Ongoing, Progressing    Description: Goals to be met by: 09/24/22     Patient will increase functional independence with ADLs by performing:    UE Dressing with Modified [place].  LE Dressing with Supervision using a/e as needed  Grooming while standing at sink with Modified [place].  Toileting from toilet with Modified [place] and Supervision for hygiene and clothing management.   Standing x5-10 minutes with Supervision.  Step transfer with Modified [place] and Supervision  Toilet transfer to toilet with Modified [place] and Supervision.                         Time Tracking:     OT Date of Treatment:    OT Start Time:  1:15  OT Stop Time: 2:00  OT Total Time (min):      Billable Minutes:Self Care/Home Management 15  Therapeutic Activity 15  Therapeutic Exercise 15    OT/GIOVANNI: OT          9/9/2022

## 2022-09-09 NOTE — PLAN OF CARE
Problem: Adult Inpatient Plan of Care  Goal: Plan of Care Review  Outcome: Ongoing, Progressing  Goal: Patient-Specific Goal (Individualized)  Outcome: Ongoing, Progressing  Goal: Absence of Hospital-Acquired Illness or Injury  Outcome: Ongoing, Progressing  Goal: Optimal Comfort and Wellbeing  Outcome: Ongoing, Progressing  Goal: Readiness for Transition of Care  Outcome: Ongoing, Progressing     Problem: Fall Injury Risk  Goal: Absence of Fall and Fall-Related Injury  Outcome: Ongoing, Progressing     Problem: Skin Injury Risk Increased  Goal: Skin Health and Integrity  Outcome: Ongoing, Progressing     Problem: Adjustment to Injury (Hip Fracture Medical Management)  Goal: Optimal Coping with Change in Health Status  Outcome: Ongoing, Progressing     Problem: Bleeding (Hip Fracture Medical Management)  Goal: Absence of Bleeding  Outcome: Ongoing, Progressing     Problem: Bowel Elimination Impaired (Hip Fracture Medical Management)  Goal: Effective Bowel Elimination  Outcome: Ongoing, Progressing     Problem: Cognitive Decline Risk (Hip Fracture Medical Management)  Goal: Baseline Cognitive Function Maintained  Outcome: Ongoing, Progressing     Problem: Embolism (Hip Fracture Medical Management)  Goal: Absence of Embolism  Outcome: Ongoing, Progressing     Problem: Fracture Stabilization and Management (Hip Fracture Medical Management)  Goal: Fracture Stability  Outcome: Ongoing, Progressing     Problem: Functional Ability Impaired (Hip Fracture Medical Management)  Goal: Optimal Functional Performance  Outcome: Ongoing, Progressing     Problem: Pain (Hip Fracture Medical Management)  Goal: Acceptable Pain Level  Outcome: Ongoing, Progressing     Problem: Urinary Elimination Impaired (Hip Fracture Medical Management)  Goal: Effective Urinary Elimination  Outcome: Ongoing, Progressing     Problem: Impaired Wound Healing  Goal: Optimal Wound Healing  Outcome: Ongoing, Progressing

## 2022-09-09 NOTE — PT/OT/SLP PROGRESS
Physical Therapy Treatment    Patient Name:  Rosa Alejo   MRN:  [id number]    Recommendations:     Discharge Recommendations:  home with home health   Discharge Equipment Recommendations: wheelchair   Barriers to discharge: None    Assessment:     Rosa Alejo is a 82 y.o. female admitted with a medical diagnosis of Left Hip fracture requiring operative repair.  She presents with the following impairments/functional limitations:  weakness, impaired endurance, impaired balance, gait instability, decreased safety awareness, impaired coordination.    Pt tolerated tx well without c/o left hip pain during standing or seated functional activity.     Rehab Prognosis: Fair; patient would benefit from acute skilled PT services to address these deficits and reach maximum level of function.    Recent Surgery: * No surgery found *      Plan:     During this hospitalization, patient to be seen 5 x/week to address the identified rehab impairments via gait training, therapeutic activities, therapeutic exercises and progress toward the following goals:    Plan of Care Expires:  09/22/22    Subjective     Chief Complaint: No new complaints on this date.  Patient/Family Comments/goals: return back home with 24/7 sitters/family  Pain/Comfort:  Pain Rating 1: 0/10  Location - Side 1: Left  Location - Orientation 1: lower  Location 1: hip      Objective:     Communicated with patient prior to session. Patient found seated in WC with OT present upon PT entry to room.     General Precautions: Standard, fall   Orthopedic Precautions:LLE weight bearing as tolerated   Braces:    Respiratory Status: Room air     Functional Mobility:  Sit to stand from chair [place]/CGA  Stand pivot t/f from WC to chair in room: [place] for initiation then CGA   Ambulation: 85ft ft using RW [place] due to safety awareness vc on walker management with shuffle gait      [facility] 6 CLICK MOBILITY          Therapeutic Activities and Exercises: to improve BLE strength,  endurance, ROM, improve sitting and standing tolerance unsupported/supported, core stability, trunk strengthening    Nustep x10min  Seated ball squeezes x 2min  Standing mini squats, marches 2 x 15  WC to toliet t/f CGA     Patient left seated on commode with sitter present and nursing notified instructing pt to pull nurse bell when finished.     GOALS:   Multidisciplinary Problems       Physical Therapy Goals          Problem: Physical Therapy    Goal Priority Disciplines Outcome Goal Variances Interventions   Physical Therapy Goal     PT, PT/OT Ongoing, Progressing     Description: Goals to be met by: 2 weeks     Patient will increase functional independence with mobility by performin. Supine to sit with MInimal Assistance. Goal Met.  2. Sit to supine with MInimal Assistance. Goal Met.  3. Rolling to Left and Right with Minimal Assistance. Goal Met.  4. Sit to stand transfer with Minimal Assistance.  5. Bed to chair transfer with Minimal Assistance using Rolling Walker  6. Gait  x 50 feet with Moderate Assistance using Rolling Walker.     Goals to be met by: 4 weeks     Patient will increase functional independence with mobility by performin. Supine to sit with Stand-by Assistance  2. Sit to supine with Stand-by Assistance  3. Rolling to Left and Right with Stand-by Assistance.  4. Sit to stand transfer with Minimal Assistance  5. Bed to chair transfer with Contact Guard Assistance using Rolling Walker  6. Gait  x 150 feet with Minimal Assistance using Rolling Walker.                          Time Tracking:     PT Received On: 22  PT Start Time: 1407     PT Stop Time: 1447  PT Total Time (min): 40 min     Billable Minutes: Therapeutic Exercise 10 Gait training 12 Therapeutic exercise 18    Treatment Type: Treatment  PT/PTA: PTA     PTA Visit Number: 3     2022

## 2022-09-10 PROCEDURE — 25000003 PHARM REV CODE 250: Performed by: FAMILY MEDICINE

## 2022-09-10 PROCEDURE — 25000003 PHARM REV CODE 250: Performed by: NURSE PRACTITIONER

## 2022-09-10 PROCEDURE — S0179 MEGESTROL 20 MG: HCPCS | Performed by: FAMILY MEDICINE

## 2022-09-10 PROCEDURE — 63600175 PHARM REV CODE 636 W HCPCS: Performed by: FAMILY MEDICINE

## 2022-09-10 PROCEDURE — 27000941

## 2022-09-10 PROCEDURE — 11000004 HC SNF PRIVATE

## 2022-09-10 RX ADMIN — DOCUSATE SODIUM 100 MG: 100 CAPSULE, LIQUID FILLED ORAL at 08:09

## 2022-09-10 RX ADMIN — NYSTATIN 500000 UNITS: 500000 SUSPENSION ORAL at 12:09

## 2022-09-10 RX ADMIN — CARBIDOPA AND LEVODOPA 1 TABLET: 25; 100 TABLET ORAL at 12:09

## 2022-09-10 RX ADMIN — NITROFURANTOIN MONOHYDRATE/MACROCRYSTALS 100 MG: 75; 25 CAPSULE ORAL at 08:09

## 2022-09-10 RX ADMIN — MEGESTROL ACETATE 200 MG: 40 SUSPENSION ORAL at 08:09

## 2022-09-10 RX ADMIN — QUETIAPINE FUMARATE 12.5 MG: 25 TABLET, FILM COATED ORAL at 08:09

## 2022-09-10 RX ADMIN — DONEPEZIL HYDROCHLORIDE 10 MG: 5 TABLET, FILM COATED ORAL at 08:09

## 2022-09-10 RX ADMIN — PANTOPRAZOLE SODIUM 20 MG: 20 TABLET, DELAYED RELEASE ORAL at 08:09

## 2022-09-10 RX ADMIN — ALUMINUM HYDROXIDE, MAGNESIUM HYDROXIDE, AND SIMETHICONE 30 ML: 200; 200; 20 SUSPENSION ORAL at 04:09

## 2022-09-10 RX ADMIN — ALUMINUM HYDROXIDE, MAGNESIUM HYDROXIDE, AND SIMETHICONE 30 ML: 200; 200; 20 SUSPENSION ORAL at 12:09

## 2022-09-10 RX ADMIN — POTASSIUM CHLORIDE 10 MEQ: 750 TABLET, EXTENDED RELEASE ORAL at 08:09

## 2022-09-10 RX ADMIN — LEVOTHYROXINE SODIUM 50 MCG: 0.05 TABLET ORAL at 06:09

## 2022-09-10 RX ADMIN — ALUMINUM HYDROXIDE, MAGNESIUM HYDROXIDE, AND SIMETHICONE 30 ML: 200; 200; 20 SUSPENSION ORAL at 06:09

## 2022-09-10 RX ADMIN — NYSTATIN 500000 UNITS: 500000 SUSPENSION ORAL at 04:09

## 2022-09-10 RX ADMIN — CETIRIZINE HYDROCHLORIDE 10 MG: 10 TABLET, FILM COATED ORAL at 08:09

## 2022-09-10 RX ADMIN — NYSTATIN 500000 UNITS: 500000 SUSPENSION ORAL at 08:09

## 2022-09-10 RX ADMIN — CARBIDOPA AND LEVODOPA 1 TABLET: 25; 100 TABLET ORAL at 04:09

## 2022-09-10 RX ADMIN — CARBIDOPA AND LEVODOPA 1 TABLET: 25; 100 TABLET ORAL at 08:09

## 2022-09-10 RX ADMIN — VENLAFAXINE HYDROCHLORIDE 75 MG: 75 CAPSULE, EXTENDED RELEASE ORAL at 08:09

## 2022-09-10 RX ADMIN — Medication 2000 UNITS: at 08:09

## 2022-09-10 RX ADMIN — LINACLOTIDE 72 MCG: 72 CAPSULE, GELATIN COATED ORAL at 06:09

## 2022-09-10 RX ADMIN — OMEGA-3 FATTY ACIDS CAP 1000 MG 1 CAPSULE: 1000 CAP at 08:09

## 2022-09-10 RX ADMIN — ASPIRIN 325 MG ORAL TABLET 325 MG: 325 PILL ORAL at 08:09

## 2022-09-10 RX ADMIN — ALUMINUM HYDROXIDE, MAGNESIUM HYDROXIDE, AND SIMETHICONE 30 ML: 200; 200; 20 SUSPENSION ORAL at 08:09

## 2022-09-10 RX ADMIN — Medication 1 TABLET: at 08:09

## 2022-09-10 RX ADMIN — ENOXAPARIN SODIUM 30 MG: 100 INJECTION SUBCUTANEOUS at 04:09

## 2022-09-10 NOTE — PLAN OF CARE
Problem: Adult Inpatient Plan of Care  Goal: Plan of Care Review  Outcome: Ongoing, Progressing  Goal: Patient-Specific Goal (Individualized)  Outcome: Ongoing, Progressing  Goal: Absence of Hospital-Acquired Illness or Injury  Outcome: Ongoing, Progressing  Goal: Optimal Comfort and Wellbeing  Outcome: Ongoing, Progressing  Goal: Readiness for Transition of Care  Outcome: Ongoing, Progressing     Problem: Fall Injury Risk  Goal: Absence of Fall and Fall-Related Injury  Outcome: Ongoing, Progressing     Problem: Pain Acute  Goal: Acceptable Pain Control and Functional Ability  Outcome: Ongoing, Progressing     Problem: Skin Injury Risk Increased  Goal: Skin Health and Integrity  Outcome: Ongoing, Progressing     Problem: Adjustment to Injury (Hip Fracture Medical Management)  Goal: Optimal Coping with Change in Health Status  Outcome: Ongoing, Progressing     Problem: Bleeding (Hip Fracture Medical Management)  Goal: Absence of Bleeding  Outcome: Ongoing, Progressing     Problem: Bowel Elimination Impaired (Hip Fracture Medical Management)  Goal: Effective Bowel Elimination  Outcome: Ongoing, Progressing     Problem: Cognitive Decline Risk (Hip Fracture Medical Management)  Goal: Baseline Cognitive Function Maintained  Outcome: Ongoing, Progressing

## 2022-09-11 PROCEDURE — 25000003 PHARM REV CODE 250: Performed by: FAMILY MEDICINE

## 2022-09-11 PROCEDURE — 27000941

## 2022-09-11 PROCEDURE — 25000003 PHARM REV CODE 250: Performed by: NURSE PRACTITIONER

## 2022-09-11 PROCEDURE — 11000004 HC SNF PRIVATE

## 2022-09-11 PROCEDURE — 63600175 PHARM REV CODE 636 W HCPCS: Performed by: FAMILY MEDICINE

## 2022-09-11 PROCEDURE — S0179 MEGESTROL 20 MG: HCPCS | Performed by: FAMILY MEDICINE

## 2022-09-11 RX ADMIN — POTASSIUM CHLORIDE 10 MEQ: 750 TABLET, EXTENDED RELEASE ORAL at 09:09

## 2022-09-11 RX ADMIN — Medication 1 TABLET: at 09:09

## 2022-09-11 RX ADMIN — NYSTATIN 500000 UNITS: 500000 SUSPENSION ORAL at 09:09

## 2022-09-11 RX ADMIN — VENLAFAXINE HYDROCHLORIDE 75 MG: 75 CAPSULE, EXTENDED RELEASE ORAL at 09:09

## 2022-09-11 RX ADMIN — CARBIDOPA AND LEVODOPA 1 TABLET: 25; 100 TABLET ORAL at 09:09

## 2022-09-11 RX ADMIN — ENOXAPARIN SODIUM 30 MG: 100 INJECTION SUBCUTANEOUS at 04:09

## 2022-09-11 RX ADMIN — MEGESTROL ACETATE 200 MG: 40 SUSPENSION ORAL at 09:09

## 2022-09-11 RX ADMIN — ASPIRIN 325 MG ORAL TABLET 325 MG: 325 PILL ORAL at 09:09

## 2022-09-11 RX ADMIN — LEVOTHYROXINE SODIUM 50 MCG: 0.05 TABLET ORAL at 05:09

## 2022-09-11 RX ADMIN — DONEPEZIL HYDROCHLORIDE 10 MG: 5 TABLET, FILM COATED ORAL at 09:09

## 2022-09-11 RX ADMIN — DOCUSATE SODIUM 100 MG: 100 CAPSULE, LIQUID FILLED ORAL at 09:09

## 2022-09-11 RX ADMIN — CARBIDOPA AND LEVODOPA 1 TABLET: 25; 100 TABLET ORAL at 04:09

## 2022-09-11 RX ADMIN — ALUMINUM HYDROXIDE, MAGNESIUM HYDROXIDE, AND SIMETHICONE 30 ML: 200; 200; 20 SUSPENSION ORAL at 09:09

## 2022-09-11 RX ADMIN — QUETIAPINE FUMARATE 12.5 MG: 25 TABLET, FILM COATED ORAL at 09:09

## 2022-09-11 RX ADMIN — Medication 2000 UNITS: at 09:09

## 2022-09-11 RX ADMIN — ALUMINUM HYDROXIDE, MAGNESIUM HYDROXIDE, AND SIMETHICONE 30 ML: 200; 200; 20 SUSPENSION ORAL at 06:09

## 2022-09-11 RX ADMIN — NITROFURANTOIN MONOHYDRATE/MACROCRYSTALS 100 MG: 75; 25 CAPSULE ORAL at 09:09

## 2022-09-11 RX ADMIN — CETIRIZINE HYDROCHLORIDE 10 MG: 10 TABLET, FILM COATED ORAL at 09:09

## 2022-09-11 RX ADMIN — NYSTATIN 500000 UNITS: 500000 SUSPENSION ORAL at 04:09

## 2022-09-11 RX ADMIN — PANTOPRAZOLE SODIUM 20 MG: 20 TABLET, DELAYED RELEASE ORAL at 09:09

## 2022-09-11 RX ADMIN — ALUMINUM HYDROXIDE, MAGNESIUM HYDROXIDE, AND SIMETHICONE 30 ML: 200; 200; 20 SUSPENSION ORAL at 04:09

## 2022-09-11 RX ADMIN — ALUMINUM HYDROXIDE, MAGNESIUM HYDROXIDE, AND SIMETHICONE 30 ML: 200; 200; 20 SUSPENSION ORAL at 12:09

## 2022-09-11 RX ADMIN — CARBIDOPA AND LEVODOPA 1 TABLET: 25; 100 TABLET ORAL at 12:09

## 2022-09-11 RX ADMIN — NYSTATIN 500000 UNITS: 500000 SUSPENSION ORAL at 12:09

## 2022-09-11 RX ADMIN — LINACLOTIDE 72 MCG: 72 CAPSULE, GELATIN COATED ORAL at 05:09

## 2022-09-11 RX ADMIN — OMEGA-3 FATTY ACIDS CAP 1000 MG 1 CAPSULE: 1000 CAP at 09:09

## 2022-09-12 LAB
ANION GAP SERPL CALCULATED.3IONS-SCNC: 13 MMOL/L (ref 7–16)
ANISOCYTOSIS BLD QL SMEAR: ABNORMAL
BASOPHILS # BLD AUTO: 0.02 K/UL (ref 0–0.2)
BASOPHILS NFR BLD AUTO: 0.3 % (ref 0–1)
BASOPHILS NFR BLD MANUAL: 1 % (ref 0–1)
BUN SERPL-MCNC: 17 MG/DL (ref 7–18)
BUN/CREAT SERPL: 19 (ref 6–20)
CALCIUM SERPL-MCNC: 8.2 MG/DL (ref 8.5–10.1)
CHLORIDE SERPL-SCNC: 103 MMOL/L (ref 98–107)
CO2 SERPL-SCNC: 27 MMOL/L (ref 21–32)
CREAT SERPL-MCNC: 0.88 MG/DL (ref 0.55–1.02)
DIFFERENTIAL METHOD BLD: ABNORMAL
EGFR (NO RACE VARIABLE) (RUSH/TITUS): 66 ML/MIN/1.73M²
EOSINOPHIL # BLD AUTO: 0.24 K/UL (ref 0–0.5)
EOSINOPHIL NFR BLD AUTO: 3.5 % (ref 1–4)
EOSINOPHIL NFR BLD MANUAL: 1 % (ref 1–4)
ERYTHROCYTE [DISTWIDTH] IN BLOOD BY AUTOMATED COUNT: 16.4 % (ref 11.5–14.5)
GLUCOSE SERPL-MCNC: 93 MG/DL (ref 74–106)
HCT VFR BLD AUTO: 28.9 % (ref 38–47)
HGB BLD-MCNC: 9.4 G/DL (ref 12–16)
HYPOCHROMIA BLD QL SMEAR: ABNORMAL
LYMPHOCYTES # BLD AUTO: 1.68 K/UL (ref 1–4.8)
LYMPHOCYTES NFR BLD AUTO: 24.8 % (ref 27–41)
LYMPHOCYTES NFR BLD MANUAL: 27 % (ref 27–41)
MCH RBC QN AUTO: 33.1 PG (ref 27–31)
MCHC RBC AUTO-ENTMCNC: 32.5 G/DL (ref 32–36)
MCV RBC AUTO: 101.8 FL (ref 80–96)
MONOCYTES # BLD AUTO: 0.52 K/UL (ref 0–0.8)
MONOCYTES NFR BLD AUTO: 7.7 % (ref 2–6)
MONOCYTES NFR BLD MANUAL: 5 % (ref 2–6)
MPC BLD CALC-MCNC: 8.6 FL (ref 9.4–12.4)
NEUTROPHILS # BLD AUTO: 4.32 K/UL (ref 1.8–7.7)
NEUTROPHILS NFR BLD AUTO: 63.7 % (ref 53–65)
NEUTS SEG NFR BLD MANUAL: 66 % (ref 50–62)
NRBC BLD MANUAL-RTO: ABNORMAL %
PLATELET # BLD AUTO: 512 K/UL (ref 150–400)
PLATELET MORPHOLOGY: ABNORMAL
POIKILOCYTOSIS BLD QL SMEAR: ABNORMAL
POTASSIUM SERPL-SCNC: 4.3 MMOL/L (ref 3.5–5.1)
RBC # BLD AUTO: 2.84 M/UL (ref 4.2–5.4)
REACTIVE LYMPHOCYTES: ABNORMAL
SODIUM SERPL-SCNC: 139 MMOL/L (ref 136–145)
WBC # BLD AUTO: 6.78 K/UL (ref 4.5–11)

## 2022-09-12 PROCEDURE — 27000941

## 2022-09-12 PROCEDURE — 63600175 PHARM REV CODE 636 W HCPCS: Performed by: FAMILY MEDICINE

## 2022-09-12 PROCEDURE — 97110 THERAPEUTIC EXERCISES: CPT | Mod: CQ

## 2022-09-12 PROCEDURE — 85025 COMPLETE CBC W/AUTO DIFF WBC: CPT | Performed by: NURSE PRACTITIONER

## 2022-09-12 PROCEDURE — 97116 GAIT TRAINING THERAPY: CPT | Mod: CQ

## 2022-09-12 PROCEDURE — 11000004 HC SNF PRIVATE

## 2022-09-12 PROCEDURE — S0179 MEGESTROL 20 MG: HCPCS | Performed by: FAMILY MEDICINE

## 2022-09-12 PROCEDURE — 97530 THERAPEUTIC ACTIVITIES: CPT | Mod: CQ

## 2022-09-12 PROCEDURE — 80048 BASIC METABOLIC PNL TOTAL CA: CPT | Performed by: NURSE PRACTITIONER

## 2022-09-12 PROCEDURE — 97535 SELF CARE MNGMENT TRAINING: CPT

## 2022-09-12 PROCEDURE — 25000003 PHARM REV CODE 250: Performed by: NURSE PRACTITIONER

## 2022-09-12 PROCEDURE — 97530 THERAPEUTIC ACTIVITIES: CPT

## 2022-09-12 PROCEDURE — 36415 COLL VENOUS BLD VENIPUNCTURE: CPT | Performed by: NURSE PRACTITIONER

## 2022-09-12 PROCEDURE — 97110 THERAPEUTIC EXERCISES: CPT

## 2022-09-12 PROCEDURE — 25000003 PHARM REV CODE 250: Performed by: FAMILY MEDICINE

## 2022-09-12 RX ORDER — ASCORBIC ACID 500 MG
500 TABLET ORAL DAILY
Status: CANCELLED | OUTPATIENT
Start: 2022-09-12

## 2022-09-12 RX ADMIN — LEVOTHYROXINE SODIUM 88 MCG: 0.09 TABLET ORAL at 06:09

## 2022-09-12 RX ADMIN — MEGESTROL ACETATE 200 MG: 40 SUSPENSION ORAL at 09:09

## 2022-09-12 RX ADMIN — VENLAFAXINE HYDROCHLORIDE 75 MG: 75 CAPSULE, EXTENDED RELEASE ORAL at 09:09

## 2022-09-12 RX ADMIN — CARBIDOPA AND LEVODOPA 1 TABLET: 25; 100 TABLET ORAL at 09:09

## 2022-09-12 RX ADMIN — ALUMINUM HYDROXIDE, MAGNESIUM HYDROXIDE, AND SIMETHICONE 30 ML: 200; 200; 20 SUSPENSION ORAL at 11:09

## 2022-09-12 RX ADMIN — DONEPEZIL HYDROCHLORIDE 10 MG: 5 TABLET, FILM COATED ORAL at 09:09

## 2022-09-12 RX ADMIN — LINACLOTIDE 72 MCG: 72 CAPSULE, GELATIN COATED ORAL at 06:09

## 2022-09-12 RX ADMIN — ASPIRIN 325 MG ORAL TABLET 325 MG: 325 PILL ORAL at 09:09

## 2022-09-12 RX ADMIN — QUETIAPINE FUMARATE 12.5 MG: 25 TABLET, FILM COATED ORAL at 08:09

## 2022-09-12 RX ADMIN — PANTOPRAZOLE SODIUM 20 MG: 20 TABLET, DELAYED RELEASE ORAL at 09:09

## 2022-09-12 RX ADMIN — ALUMINUM HYDROXIDE, MAGNESIUM HYDROXIDE, AND SIMETHICONE 30 ML: 200; 200; 20 SUSPENSION ORAL at 04:09

## 2022-09-12 RX ADMIN — NITROFURANTOIN MONOHYDRATE/MACROCRYSTALS 100 MG: 75; 25 CAPSULE ORAL at 08:09

## 2022-09-12 RX ADMIN — NYSTATIN 500000 UNITS: 500000 SUSPENSION ORAL at 08:09

## 2022-09-12 RX ADMIN — Medication 6 MG: at 08:09

## 2022-09-12 RX ADMIN — NYSTATIN 500000 UNITS: 500000 SUSPENSION ORAL at 04:09

## 2022-09-12 RX ADMIN — CETIRIZINE HYDROCHLORIDE 10 MG: 10 TABLET, FILM COATED ORAL at 09:09

## 2022-09-12 RX ADMIN — ENOXAPARIN SODIUM 30 MG: 100 INJECTION SUBCUTANEOUS at 04:09

## 2022-09-12 RX ADMIN — ALUMINUM HYDROXIDE, MAGNESIUM HYDROXIDE, AND SIMETHICONE 30 ML: 200; 200; 20 SUSPENSION ORAL at 06:09

## 2022-09-12 RX ADMIN — CARBIDOPA AND LEVODOPA 1 TABLET: 25; 100 TABLET ORAL at 04:09

## 2022-09-12 RX ADMIN — CARBIDOPA AND LEVODOPA 1 TABLET: 25; 100 TABLET ORAL at 11:09

## 2022-09-12 RX ADMIN — POTASSIUM CHLORIDE 10 MEQ: 750 TABLET, EXTENDED RELEASE ORAL at 08:09

## 2022-09-12 RX ADMIN — ALUMINUM HYDROXIDE, MAGNESIUM HYDROXIDE, AND SIMETHICONE 30 ML: 200; 200; 20 SUSPENSION ORAL at 08:09

## 2022-09-12 RX ADMIN — CARBIDOPA AND LEVODOPA 1 TABLET: 25; 100 TABLET ORAL at 06:09

## 2022-09-12 RX ADMIN — POTASSIUM CHLORIDE 10 MEQ: 750 TABLET, EXTENDED RELEASE ORAL at 09:09

## 2022-09-12 RX ADMIN — Medication 1 TABLET: at 09:09

## 2022-09-12 RX ADMIN — OMEGA-3 FATTY ACIDS CAP 1000 MG 1 CAPSULE: 1000 CAP at 09:09

## 2022-09-12 RX ADMIN — NYSTATIN 500000 UNITS: 500000 SUSPENSION ORAL at 09:09

## 2022-09-12 RX ADMIN — NITROFURANTOIN MONOHYDRATE/MACROCRYSTALS 100 MG: 75; 25 CAPSULE ORAL at 09:09

## 2022-09-12 RX ADMIN — Medication 2000 UNITS: at 09:09

## 2022-09-12 RX ADMIN — DOCUSATE SODIUM 100 MG: 100 CAPSULE, LIQUID FILLED ORAL at 09:09

## 2022-09-12 NOTE — PT/OT/SLP PROGRESS
Occupational Therapy   Treatment    Name: Rosa Alejo  MRN: [id number]  Admitting Diagnosis:  Hip fracture requiring operative repair       Recommendations:     Discharge Recommendations: home with home health  Discharge Equipment Recommendations:  walker, rolling  Barriers to discharge:       Assessment:     Rosa Alejo is a 82 y.o. female with a medical diagnosis of Hip fracture requiring operative repair.  She presents with decreased balance, strength and actvity tolerance. Performance deficits affecting function are weakness, impaired endurance, impaired functional mobility, impaired balance, impaired cognition, decreased safety awareness, impaired self care skills. Pt tolerated skilled OT well and was given therapeutic rest breaks as needed. Pt participated well in skilled OT and had fair effort in therapy on today. Pt was more energetic in the afternoon. Pt reports better performance in evening however she also reported eating better on today. Pt reports not getting out of bed this weekend    Rehab Prognosis:  Fair; patient would benefit from acute skilled OT services to address these deficits and reach maximum level of function.       Plan:     Patient to be seen 5 x/week to address the above listed problems via self-care/home management, therapeutic activities, therapeutic exercises, neuromuscular re-education  Plan of Care Expires:    Plan of Care Reviewed with: patient    Subjective     Pain/Comfort:  Pain Rating 1: 6/10  Location - Side 1: Left  Location - Orientation 1: lower    Objective:     Communicated with: Pt prior to session.  Patient found supine with sitter   upon OT entry to room.    General Precautions: Standard, fall   Orthopedic Precautions:LLE weight bearing as tolerated   Braces:    Respiratory Status: Room air     Occupational Performance:     Bed Mobility:    Patient completed Supine to Sit with min assistance     Functional Mobility/Transfers:  Patient completed Sit <> Stand  Transfer with CGA  with  rolling walker   Functional Mobility: Pt completed functional ambulation down the hallway using the RW for balance and safety requiring Min A. Pt will continue to requires assistance due to fluctations     Activities of Daily Living:  Sit to stand t/f with Mod I with cues to push up from the chair  Toileting and toilet t/f with Mod I using the     [facility] 6 Click ADL: 21    Treatment & Education:  While sitting in w/c, Pt [id number] mins on arm ergometer to increase endurance, BUE strength and activity tolerance for ADL performance    Therapeutic activity:      Patient left up in chair with all lines intact and call button in reach    GOALS:   Multidisciplinary Problems       Occupational Therapy Goals          Problem: Occupational Therapy    Goal Priority Disciplines Outcome Interventions   Occupational Therapy Goal     OT, PT/OT Ongoing, Progressing    Description: Goals to be met by: 09/24/22     Patient will increase functional independence with ADLs by performing:    UE Dressing with Modified [place].  LE Dressing with Supervision using a/e as needed  Grooming while standing at sink with Modified [place].  Toileting from toilet with Modified [place] and Supervision for hygiene and clothing management.   Standing x5-10 minutes with Supervision.  Step transfer with Modified [place] and Supervision  Toilet transfer to toilet with Modified [place] and Supervision.                         Time Tracking:     OT Date of Treatment:    OT Start Time: 2:00   OT Stop Time: 2:40  OT Total Time (min):      Billable Minutes:Self Care/Home Management 15  Therapeutic Activity 10  Therapeutic Exercise 15    OT/GIOVANNI: OT          9/12/2022

## 2022-09-12 NOTE — PT/OT/SLP PROGRESS
"Physical Therapy Treatment    Patient Name:  Rosa Alejo   MRN:  [id number]    Recommendations:     Discharge Recommendations:  home with home health   Discharge Equipment Recommendations: walker, rolling, hospital bed   Barriers to discharge: None    Assessment:     Rosa Alejo is a 82 y.o. female admitted with a medical diagnosis of Left Hip fracture requiring operative repair.  She presents with the following impairments/functional limitations:  weakness, impaired endurance, impaired balance, gait instability, decreased safety awareness, impaired coordination, impaired skin.    Pt tolerated tx well with increased time to perform gait training in room due to symptoms of current diagnosis of Parkinson's present with shuffle gait using RW CGA. Pt plans to discharge home with family and caregivers end of week. Per caregivers, pt has met her PLOF + some.     Rehab Prognosis: Fair; patient would benefit from acute skilled PT services to address these deficits and reach maximum level of function.    Recent Surgery: * No surgery found *      Plan:     During this hospitalization, patient to be seen 5 x/week to address the identified rehab impairments via gait training, therapeutic activities, therapeutic exercises and progress toward the following goals:    Plan of Care Expires:  09/22/22    Subjective     Chief Complaint: Extreme fatigue. Pt reports she did not get out of bed over the weekend stating ," My feet were killing me."  Patient/Family Comments/goals: return back home with 24/7 sitters/family  Pain/Comfort:  Pain Rating 1: 4/10  Location - Side 1: Left  Location - Orientation 1: lower  Location 1: hip  Pain Addressed 1: Distraction, Reposition      Objective:     Communicated with patient prior to session. Patient found seated in WC with OT present upon PT entry to room.     General Precautions: Standard, fall   Orthopedic Precautions:LLE weight bearing as tolerated   Braces:    Respiratory Status: Room " air     Functional Mobility:  Sit to stand from chair [place]/CGA  Stand pivot t/f from WC to chair in room: [place] for initiation then CGA   Ambulation: 15 ft using RW CGA due to safety awareness in room from bed to bathroom then bathroom to at doorway  after toileting.        AM-PAC 6 CLICK MOBILITY  Turning over in bed (including adjusting bedclothes, sheets and blankets)?: 4  Sitting down on and standing up from a chair with arms (e.g., wheelchair, bedside commode, etc.): 4  Moving from lying on back to sitting on the side of the bed?: 4  Moving to and from a bed to a chair (including a wheelchair)?: 4  Need to walk in hospital room?: 2  Climbing 3-5 steps with a railing?: 1  Basic Mobility Total Score: 19       Therapeutic Activities and Exercises: to improve BLE strength, endurance, ROM, improve sitting and standing tolerance unsupported/supported, core stability, trunk strengthening  :    Bed mobs: Supine to sit Elsa  Sit to stand from EOB SBA using RW  Sit to stand x 2 from toilet SBA using RW  Lower body dressing with sit to stand [place]/CGA with shorts and brief   Nustep x10min  Seated HS curls gr tband 2 x 10       Patient left seated in WC with OT present in therapy gym.     GOALS:   Multidisciplinary Problems       Physical Therapy Goals          Problem: Physical Therapy    Goal Priority Disciplines Outcome Goal Variances Interventions   Physical Therapy Goal     PT, PT/OT Ongoing, Progressing     Description: Goals to be met by: 2 weeks     Patient will increase functional independence with mobility by performin. Supine to sit with MInimal Assistance. Goal Met.  2. Sit to supine with MInimal Assistance. Goal Met.  3. Rolling to Left and Right with Minimal Assistance. Goal Met.  4. Sit to stand transfer with Minimal Assistance.  5. Bed to chair transfer with Minimal Assistance using Rolling Walker  6. Gait  x 50 feet with Moderate Assistance using Rolling Walker.     Goals to be met by: 4 weeks      Patient will increase functional independence with mobility by performin. Supine to sit with Stand-by Assistance  2. Sit to supine with Stand-by Assistance  3. Rolling to Left and Right with Stand-by Assistance.  4. Sit to stand transfer with Minimal Assistance  5. Bed to chair transfer with Contact Guard Assistance using Rolling Walker  6. Gait  x 150 feet with Minimal Assistance using Rolling Walker.                          Time Tracking:     PT Received On: 22  PT Start Time: 1307     PT Stop Time: 1347  PT Total Time (min): 40 min     Billable Minutes: Therapeutic Exercise 15 Gait training 10 Therapeutic exercise 15    Treatment Type: Treatment  PT/PTA: PTA     PTA Visit Number: 4     2022

## 2022-09-12 NOTE — PROGRESS NOTES
"Wt: 100# RDN visited pt this a.m. and she stated that she thinks her intake is better.  ~25-50% per recall.  She is now on Megace once daily.  Her staples to her surgical WND have been dc'd.  She has a new area of skin breakdown to her L buttock.  Hgb 9.4, Hct 28.9.  She does not want to drink Ensure Enlive R/T complains that it "swells her belly up".  RDN took Ensure Clear for pt to try and she liked it.  No c/o GI distress.  Last BM 9/10 per EMR.  Rec 1. D/C Ensure Enlive 2. Ensure Clear one carton po BID 3. MVM daily 4. Vit C 500mg po daily.  Continue to follow.   "

## 2022-09-13 PROCEDURE — 97535 SELF CARE MNGMENT TRAINING: CPT

## 2022-09-13 PROCEDURE — 63600175 PHARM REV CODE 636 W HCPCS: Performed by: FAMILY MEDICINE

## 2022-09-13 PROCEDURE — 99499 UNLISTED E&M SERVICE: CPT | Mod: ,,, | Performed by: FAMILY MEDICINE

## 2022-09-13 PROCEDURE — 97530 THERAPEUTIC ACTIVITIES: CPT

## 2022-09-13 PROCEDURE — 99499 NO LOS: ICD-10-PCS | Mod: ,,, | Performed by: FAMILY MEDICINE

## 2022-09-13 PROCEDURE — 27000941

## 2022-09-13 PROCEDURE — 27000946

## 2022-09-13 PROCEDURE — 25000003 PHARM REV CODE 250: Performed by: FAMILY MEDICINE

## 2022-09-13 PROCEDURE — 97110 THERAPEUTIC EXERCISES: CPT

## 2022-09-13 PROCEDURE — 25000003 PHARM REV CODE 250: Performed by: NURSE PRACTITIONER

## 2022-09-13 PROCEDURE — S0179 MEGESTROL 20 MG: HCPCS | Performed by: FAMILY MEDICINE

## 2022-09-13 PROCEDURE — 97116 GAIT TRAINING THERAPY: CPT

## 2022-09-13 PROCEDURE — 11000004 HC SNF PRIVATE

## 2022-09-13 RX ORDER — MEGESTROL ACETATE 40 MG/ML
200 SUSPENSION ORAL
Status: DISCONTINUED | OUTPATIENT
Start: 2022-09-13 | End: 2022-09-16 | Stop reason: HOSPADM

## 2022-09-13 RX ORDER — MEGESTROL ACETATE 40 MG/ML
200 SUSPENSION ORAL 4 TIMES DAILY
Status: CANCELLED | OUTPATIENT
Start: 2022-09-13 | Stop reason: SDUPTHER

## 2022-09-13 RX ORDER — ASCORBIC ACID 500 MG
500 TABLET ORAL DAILY
Status: DISCONTINUED | OUTPATIENT
Start: 2022-09-13 | End: 2022-09-16 | Stop reason: HOSPADM

## 2022-09-13 RX ADMIN — CETIRIZINE HYDROCHLORIDE 10 MG: 10 TABLET, FILM COATED ORAL at 09:09

## 2022-09-13 RX ADMIN — ALUMINUM HYDROXIDE, MAGNESIUM HYDROXIDE, AND SIMETHICONE 30 ML: 200; 200; 20 SUSPENSION ORAL at 09:09

## 2022-09-13 RX ADMIN — MEGESTROL ACETATE 200 MG: 40 SUSPENSION ORAL at 12:09

## 2022-09-13 RX ADMIN — ALUMINUM HYDROXIDE, MAGNESIUM HYDROXIDE, AND SIMETHICONE 30 ML: 200; 200; 20 SUSPENSION ORAL at 12:09

## 2022-09-13 RX ADMIN — CARBIDOPA AND LEVODOPA 1 TABLET: 25; 100 TABLET ORAL at 12:09

## 2022-09-13 RX ADMIN — DONEPEZIL HYDROCHLORIDE 10 MG: 5 TABLET, FILM COATED ORAL at 09:09

## 2022-09-13 RX ADMIN — MEGESTROL ACETATE 200 MG: 40 SUSPENSION ORAL at 09:09

## 2022-09-13 RX ADMIN — LINACLOTIDE 72 MCG: 72 CAPSULE, GELATIN COATED ORAL at 06:09

## 2022-09-13 RX ADMIN — Medication 2000 UNITS: at 09:09

## 2022-09-13 RX ADMIN — ALUMINUM HYDROXIDE, MAGNESIUM HYDROXIDE, AND SIMETHICONE 30 ML: 200; 200; 20 SUSPENSION ORAL at 06:09

## 2022-09-13 RX ADMIN — OMEGA-3 FATTY ACIDS CAP 1000 MG 1 CAPSULE: 1000 CAP at 09:09

## 2022-09-13 RX ADMIN — CARBIDOPA AND LEVODOPA 1 TABLET: 25; 100 TABLET ORAL at 09:09

## 2022-09-13 RX ADMIN — ALUMINUM HYDROXIDE, MAGNESIUM HYDROXIDE, AND SIMETHICONE 30 ML: 200; 200; 20 SUSPENSION ORAL at 04:09

## 2022-09-13 RX ADMIN — NYSTATIN 500000 UNITS: 500000 SUSPENSION ORAL at 09:09

## 2022-09-13 RX ADMIN — NITROFURANTOIN MONOHYDRATE/MACROCRYSTALS 100 MG: 75; 25 CAPSULE ORAL at 09:09

## 2022-09-13 RX ADMIN — MULTIPLE VITAMINS W/ MINERALS TAB 1 TABLET: TAB at 12:09

## 2022-09-13 RX ADMIN — CARBIDOPA AND LEVODOPA 1 TABLET: 25; 100 TABLET ORAL at 06:09

## 2022-09-13 RX ADMIN — ASPIRIN 325 MG ORAL TABLET 325 MG: 325 PILL ORAL at 09:09

## 2022-09-13 RX ADMIN — POTASSIUM CHLORIDE 10 MEQ: 750 TABLET, EXTENDED RELEASE ORAL at 09:09

## 2022-09-13 RX ADMIN — LEVOTHYROXINE SODIUM 88 MCG: 0.09 TABLET ORAL at 06:09

## 2022-09-13 RX ADMIN — NYSTATIN 500000 UNITS: 500000 SUSPENSION ORAL at 04:09

## 2022-09-13 RX ADMIN — VENLAFAXINE HYDROCHLORIDE 75 MG: 75 CAPSULE, EXTENDED RELEASE ORAL at 09:09

## 2022-09-13 RX ADMIN — Medication 1 TABLET: at 09:09

## 2022-09-13 RX ADMIN — CARBIDOPA AND LEVODOPA 1 TABLET: 25; 100 TABLET ORAL at 04:09

## 2022-09-13 RX ADMIN — DOCUSATE SODIUM 100 MG: 100 CAPSULE, LIQUID FILLED ORAL at 09:09

## 2022-09-13 RX ADMIN — OXYCODONE HYDROCHLORIDE AND ACETAMINOPHEN 500 MG: 500 TABLET ORAL at 12:09

## 2022-09-13 RX ADMIN — ENOXAPARIN SODIUM 30 MG: 100 INJECTION SUBCUTANEOUS at 04:09

## 2022-09-13 RX ADMIN — QUETIAPINE FUMARATE 12.5 MG: 25 TABLET, FILM COATED ORAL at 09:09

## 2022-09-13 RX ADMIN — PANTOPRAZOLE SODIUM 20 MG: 20 TABLET, DELAYED RELEASE ORAL at 09:09

## 2022-09-13 RX ADMIN — NYSTATIN 500000 UNITS: 500000 SUSPENSION ORAL at 12:09

## 2022-09-13 RX ADMIN — MEGESTROL ACETATE 200 MG: 40 SUSPENSION ORAL at 04:09

## 2022-09-13 NOTE — PT/OT/SLP PROGRESS
Occupational Therapy   Treatment    Name: Rosa Alejo  MRN: [id number]  Admitting Diagnosis:  Hip fracture requiring operative repair       Recommendations:     Discharge Recommendations: home with home health  Discharge Equipment Recommendations:  walker, rolling  Barriers to discharge:       Assessment:     Rosa Alejo is a 82 y.o. female with a medical diagnosis of Hip fracture requiring operative repair.  She presents with decreased balance, strength and actvity tolerance. Performance deficits affecting function are weakness, impaired endurance, impaired functional mobility, impaired balance, impaired cognition, decreased safety awareness, impaired self care skills. Pt tolerated skilled OT well and was given therapeutic rest breaks as needed. Pt participated fairly with skilled OT and was given therapeutic rest breaks as needed. Pt occasionally has tremors and momentary freezes therefore skilled OT should be continued.       Rehab Prognosis:  Fair; patient would benefit from acute skilled OT services to address these deficits and reach maximum level of function.       Plan:     Patient to be seen 5 x/week to address the above listed problems via self-care/home management, therapeutic activities, therapeutic exercises, neuromuscular re-education  Plan of Care Expires:    Plan of Care Reviewed with: patient    Subjective     Pain/Comfort:  Pain Rating 1: 6/10  Location - Side 1: Left  Location - Orientation 1: lower    Objective:     Communicated with: Pt prior to session.  Patient found supine with sitter   upon OT entry to room.    General Precautions: Standard, fall   Orthopedic Precautions:LLE weight bearing as tolerated   Braces:    Respiratory Status: Room air     Occupational Performance:     Bed Mobility:    Patient completed Supine to Sit with min assistance     Functional Mobility/Transfers:  Patient completed Sit <> Stand Transfer with CGA -Mod I with  rolling walker   Functional Mobility: Pt  completed functional ambulation down the hallway using the RW for balance and safety requiring Min A. Pt will continue to requires assistance due to fluctations     Activities of Daily Living:  Tf assessed on today.    [facility] 6 Click ADL: 21      Treatment & Education:    Therapeutic exercise    While sitting in the w/c the pt bicep curls: 2 sets of 20 reps with 4# DB and tricep extension with blue theraband to increase BUE strength for ADLs and T/fs:    While sitting in w/c, Pt [id number] mins on arm ergometer to increase endurance, BUE strength and activity tolerance for ADL performance    Therapeutic activity:  Pt attempted static standing with pegs but had a freezing episode and was unable to to completed due to coordination therefore the pt was educated to remain standing.     Patient left up in chair with all lines intact and call button in reach    GOALS:   Multidisciplinary Problems       Occupational Therapy Goals          Problem: Occupational Therapy    Goal Priority Disciplines Outcome Interventions   Occupational Therapy Goal     OT, PT/OT Ongoing, Progressing    Description: Goals to be met by: 09/24/22     Patient will increase functional independence with ADLs by performing:    UE Dressing with Modified [place].  LE Dressing with Supervision using a/e as needed  Grooming while standing at sink with Modified [place].  Toileting from toilet with Modified [place] and Supervision for hygiene and clothing management.   Standing x5-10 minutes with Supervision.  Step transfer with Modified [place] and Supervision  Toilet transfer to toilet with Modified [place] and Supervision.                         Time Tracking:     OT Date of Treatment:    OT Start Time:11:30  OT Stop Time: 12:15  OT Total Time (min):      Billable Minutes:Self Care/Home Management 15  Therapeutic Activity 15  Therapeutic Exercise 15    OT/GIOVANNI: OT          9/13/2022

## 2022-09-13 NOTE — PT/OT/SLP PROGRESS
Physical Therapy Treatment    Patient Name:  Rosa Alejo   MRN:  [id number]    Recommendations:     Discharge Recommendations:  home with home health   Discharge Equipment Recommendations: walker, rolling   Barriers to discharge: None    Assessment:     Rosa Alejo is a 82 y.o. female admitted with a medical diagnosis of Left Hip fracture requiring operative repair.  She presents with the following impairments/functional limitations:  weakness, impaired endurance, impaired functional mobility, gait instability, impaired balance, decreased lower extremity function.    Pt tolerated tx well and able to increase ambulation distance today. Still fatigues easily and requires frequent rest breaks. Patient is approaching baseline functional level.    Rehab Prognosis: Fair; patient would benefit from acute skilled PT services to address these deficits and reach maximum level of function.    Recent Surgery: * No surgery found *      Plan:     During this hospitalization, patient to be seen 5 x/week to address the identified rehab impairments via gait training, therapeutic exercises and progress toward the following goals:    Plan of Care Expires:  09/22/22    Subjective     Chief Complaint: Pt reports fatigue.  Patient/Family Comments/goals: return back home with 24/7 sitters/family  Pain/Comfort:  Pain Rating 1: 0/10      Objective:     Communicated with patient prior to session. Patient found seated in supine  upon PT entry to room.     General Precautions: Standard, fall   Orthopedic Precautions:LLE weight bearing as tolerated   Braces: N/A  Respiratory Status: Room air     Functional Mobility:  Sit to stand from chair [place]/CGA  Stand pivot t/f from WC to chair in room: CGA   Ambulation: 60 ft using RW CGA/Min A with slow greg and some body sway; short reciprocal steps.      AM-PAC 6 CLICK MOBILITY  Turning over in bed (including adjusting bedclothes, sheets and blankets)?: 4  Sitting down on and standing up from  a chair with arms (e.g., wheelchair, bedside commode, etc.): 3  Moving from lying on back to sitting on the side of the bed?: 4  Moving to and from a bed to a chair (including a wheelchair)?: 3  Need to walk in hospital room?: 3  Climbing 3-5 steps with a railing?: 1  Basic Mobility Total Score: 18       Therapeutic Activities and Exercises: to improve BLE strength, endurance, ROM, improve sitting and standing tolerance unsupported/supported, core stability, trunk strengthening  :    Bed mobs: Supine to sit SBA  Sit to stand from EOB SBA/CGA using RW  Sit to stand x 2 from BSC CGA using RW    Seated HS curls and hip abduction with red tband 2 x 10   LAQ and hip marching 2 x 10      Patient left seated in WC in room with call bell in reach.    GOALS:   Multidisciplinary Problems       Physical Therapy Goals          Problem: Physical Therapy    Goal Priority Disciplines Outcome Goal Variances Interventions   Physical Therapy Goal     PT, PT/OT Ongoing, Progressing     Description: Goals to be met by: 2 weeks     Patient will increase functional independence with mobility by performin. Supine to sit with MInimal Assistance. Goal Met. Goal Met.  2. Sit to supine with MInimal Assistance. Goal Met. Goal Met.  3. Rolling to Left and Right with Minimal Assistance. Goal Met.  4. Sit to stand transfer with Minimal Assistance. Goal Met.  5. Bed to chair transfer with Minimal Assistance using Rolling Walker. Goal Met.  6. Gait  x 50 feet with Moderate Assistance using Rolling Walker.  Goal Met.    Goals to be met by: 4 weeks     Patient will increase functional independence with mobility by performin. Supine to sit with Stand-by Assistance  2. Sit to supine with Stand-by Assistance  3. Rolling to Left and Right with Stand-by Assistance.  4. Sit to stand transfer with Minimal Assistance  5. Bed to chair transfer with Contact Guard Assistance using Rolling Walker  6. Gait  x 150 feet with Minimal Assistance using  Rolling Walker.                          Time Tracking:     PT Received On: 09/13/22  PT Start Time: 1020     PT Stop Time: 1050  PT Total Time (min): 30 min     Billable Minutes: Therapeutic Exercise 15 Gait training 15     Treatment Type: Treatment  PT/PTA: PT     PTA Visit Number: 4     09/13/2022

## 2022-09-13 NOTE — PLAN OF CARE
[facility] - Medical Surgical Unit - Swing Bed   Interdisciplinary Team Meeting    Patient: Rosa Alejo   Today's Date: 9/13/2022   Estimated D/C Date: 9/16/22        Physician:Pérez Skinner D.O. Nurse Practitioner:    Pharmacy:[facility] Director: MONICA Ray   : Trice Paredes RN Physical/Occupational Therapy: Ainsley Marin OT   Speech Therapy: ST Corazon    Nursing: Ellen Menon RN  Respiratory: Annita Ceron, Resp. Dietary: Yariel Morales, Samir   Other:      Nurse  New Symptoms/Problems: UTI  Last BM: 9/13/22 Urine: incontinent Diarrhea: Yes   Constipated: No Bowel: incontinent [place]: No   Isolation: No D/C Date:  Date:    O2 Device: Room Air O2 Flow:   SpO2: 98 %   Nutrition: Regular, Ensure Clear  Speech/Swallowing: No current speech or swallowing issues  Aspiration Precautions: No  Cognition: Memory issues    Physical Therapy  Physical Therapy/Gait: ambulate 15 ft with RW & CGA for safety ELOS: Plan to DC  9/16/22   Transfers: Minimal Assistance to CGA Range of Motion/Restrictions:      Occupational Therapy  Eating/Grooming: Modified Independent Toileting: Modified Independent with cues to push up from chair   Bathing: Modified Independent Dressing (Upper Body): Modified Independent   Dressing (Lower Body): Supervision or Set-up Assistance        Tx Plan/Recommendations reviewed with family and/or patient on (date) 9/12/22.  Additional family Conference/Training:   D/C Plan/Recommendations: Home with HH  BRODERICK: 9/16/22    Pharmacy  Medication Changes (see MD orders in chart): No  MD:Pérez Skinner D.O. Labs Reviewed: Yes New Lab Orders: No   MD/NP Signature:

## 2022-09-13 NOTE — PLAN OF CARE
Problem: Adult Inpatient Plan of Care  Goal: Plan of Care Review  Outcome: Ongoing, Progressing  Goal: Patient-Specific Goal (Individualized)  Outcome: Ongoing, Progressing  Goal: Optimal Comfort and Wellbeing  Outcome: Ongoing, Progressing     Problem: Fall Injury Risk  Goal: Absence of Fall and Fall-Related Injury  Outcome: Ongoing, Progressing     Problem: Cognitive Decline Risk (Hip Fracture Medical Management)  Goal: Baseline Cognitive Function Maintained  Outcome: Ongoing, Progressing     Problem: Embolism (Hip Fracture Medical Management)  Goal: Absence of Embolism  Outcome: Ongoing, Progressing

## 2022-09-13 NOTE — PLAN OF CARE
Problem: Physical Therapy  Goal: Physical Therapy Goal  Description: Goals to be met by: 2 weeks     Patient will increase functional independence with mobility by performin. Supine to sit with MInimal Assistance. Goal Met. Goal Met.  2. Sit to supine with MInimal Assistance. Goal Met. Goal Met.  3. Rolling to Left and Right with Minimal Assistance. Goal Met.  4. Sit to stand transfer with Minimal Assistance. Goal Met.  5. Bed to chair transfer with Minimal Assistance using Rolling Walker. Goal Met.  6. Gait  x 50 feet with Moderate Assistance using Rolling Walker.  Goal Met.    Goals to be met by: 4 weeks     Patient will increase functional independence with mobility by performin. Supine to sit with Stand-by Assistance  2. Sit to supine with Stand-by Assistance  3. Rolling to Left and Right with Stand-by Assistance.  4. Sit to stand transfer with Minimal Assistance  5. Bed to chair transfer with Contact Guard Assistance using Rolling Walker  6. Gait  x 150 feet with Minimal Assistance using Rolling Walker.     Outcome: Ongoing, Progressing

## 2022-09-13 NOTE — PLAN OF CARE
Swing Bed Recertification    Patient Name: Rosa Alejo                                                            MRN: [id number]   : 1940   Diagnosis: Hip fracture requiring operative repair [S72.009A]     I certify that continued skilled inpatient care is necessary for the following reasons: to continue with PT/OT services, pain management, and supportive care    Estimated discharge date:  22

## 2022-09-14 DIAGNOSIS — Z87.81 S/P LEFT HIP FRACTURE: Primary | ICD-10-CM

## 2022-09-14 PROCEDURE — 97110 THERAPEUTIC EXERCISES: CPT

## 2022-09-14 PROCEDURE — 97116 GAIT TRAINING THERAPY: CPT

## 2022-09-14 PROCEDURE — 25000003 PHARM REV CODE 250: Performed by: NURSE PRACTITIONER

## 2022-09-14 PROCEDURE — 63600175 PHARM REV CODE 636 W HCPCS: Performed by: FAMILY MEDICINE

## 2022-09-14 PROCEDURE — 11000004 HC SNF PRIVATE

## 2022-09-14 PROCEDURE — 27000941

## 2022-09-14 PROCEDURE — 25000003 PHARM REV CODE 250: Performed by: FAMILY MEDICINE

## 2022-09-14 PROCEDURE — S0179 MEGESTROL 20 MG: HCPCS | Performed by: FAMILY MEDICINE

## 2022-09-14 PROCEDURE — 97530 THERAPEUTIC ACTIVITIES: CPT

## 2022-09-14 RX ADMIN — POTASSIUM CHLORIDE 10 MEQ: 750 TABLET, EXTENDED RELEASE ORAL at 09:09

## 2022-09-14 RX ADMIN — MULTIPLE VITAMINS W/ MINERALS TAB 1 TABLET: TAB at 09:09

## 2022-09-14 RX ADMIN — NITROFURANTOIN MONOHYDRATE/MACROCRYSTALS 100 MG: 75; 25 CAPSULE ORAL at 09:09

## 2022-09-14 RX ADMIN — DOCUSATE SODIUM 100 MG: 100 CAPSULE, LIQUID FILLED ORAL at 09:09

## 2022-09-14 RX ADMIN — NYSTATIN 500000 UNITS: 500000 SUSPENSION ORAL at 09:09

## 2022-09-14 RX ADMIN — OXYCODONE HYDROCHLORIDE AND ACETAMINOPHEN 500 MG: 500 TABLET ORAL at 09:09

## 2022-09-14 RX ADMIN — NITROFURANTOIN MONOHYDRATE/MACROCRYSTALS 100 MG: 75; 25 CAPSULE ORAL at 08:09

## 2022-09-14 RX ADMIN — CARBIDOPA AND LEVODOPA 1 TABLET: 25; 100 TABLET ORAL at 11:09

## 2022-09-14 RX ADMIN — LINACLOTIDE 72 MCG: 72 CAPSULE, GELATIN COATED ORAL at 05:09

## 2022-09-14 RX ADMIN — NYSTATIN 500000 UNITS: 500000 SUSPENSION ORAL at 02:09

## 2022-09-14 RX ADMIN — Medication 1 TABLET: at 09:09

## 2022-09-14 RX ADMIN — ALUMINUM HYDROXIDE, MAGNESIUM HYDROXIDE, AND SIMETHICONE 30 ML: 200; 200; 20 SUSPENSION ORAL at 05:09

## 2022-09-14 RX ADMIN — MEGESTROL ACETATE 200 MG: 40 SUSPENSION ORAL at 05:09

## 2022-09-14 RX ADMIN — ALUMINUM HYDROXIDE, MAGNESIUM HYDROXIDE, AND SIMETHICONE 30 ML: 200; 200; 20 SUSPENSION ORAL at 11:09

## 2022-09-14 RX ADMIN — ENOXAPARIN SODIUM 30 MG: 100 INJECTION SUBCUTANEOUS at 05:09

## 2022-09-14 RX ADMIN — NYSTATIN 500000 UNITS: 500000 SUSPENSION ORAL at 08:09

## 2022-09-14 RX ADMIN — ASPIRIN 325 MG ORAL TABLET 325 MG: 325 PILL ORAL at 09:09

## 2022-09-14 RX ADMIN — NYSTATIN 500000 UNITS: 500000 SUSPENSION ORAL at 05:09

## 2022-09-14 RX ADMIN — QUETIAPINE FUMARATE 12.5 MG: 25 TABLET, FILM COATED ORAL at 08:09

## 2022-09-14 RX ADMIN — LEVOTHYROXINE SODIUM 88 MCG: 0.09 TABLET ORAL at 05:09

## 2022-09-14 RX ADMIN — POTASSIUM CHLORIDE 10 MEQ: 750 TABLET, EXTENDED RELEASE ORAL at 08:09

## 2022-09-14 RX ADMIN — ALUMINUM HYDROXIDE, MAGNESIUM HYDROXIDE, AND SIMETHICONE 30 ML: 200; 200; 20 SUSPENSION ORAL at 08:09

## 2022-09-14 RX ADMIN — CARBIDOPA AND LEVODOPA 1 TABLET: 25; 100 TABLET ORAL at 06:09

## 2022-09-14 RX ADMIN — MEGESTROL ACETATE 200 MG: 40 SUSPENSION ORAL at 11:09

## 2022-09-14 RX ADMIN — CARBIDOPA AND LEVODOPA 1 TABLET: 25; 100 TABLET ORAL at 03:09

## 2022-09-14 RX ADMIN — PANTOPRAZOLE SODIUM 20 MG: 20 TABLET, DELAYED RELEASE ORAL at 08:09

## 2022-09-14 RX ADMIN — Medication 2000 UNITS: at 08:09

## 2022-09-14 RX ADMIN — CETIRIZINE HYDROCHLORIDE 10 MG: 10 TABLET, FILM COATED ORAL at 08:09

## 2022-09-14 RX ADMIN — DONEPEZIL HYDROCHLORIDE 10 MG: 5 TABLET, FILM COATED ORAL at 09:09

## 2022-09-14 RX ADMIN — OMEGA-3 FATTY ACIDS CAP 1000 MG 1 CAPSULE: 1000 CAP at 09:09

## 2022-09-14 RX ADMIN — ACETAMINOPHEN 325MG 325 MG: 325 TABLET ORAL at 08:09

## 2022-09-14 RX ADMIN — CARBIDOPA AND LEVODOPA 1 TABLET: 25; 100 TABLET ORAL at 09:09

## 2022-09-14 RX ADMIN — MEGESTROL ACETATE 200 MG: 40 SUSPENSION ORAL at 09:09

## 2022-09-14 RX ADMIN — VENLAFAXINE HYDROCHLORIDE 75 MG: 75 CAPSULE, EXTENDED RELEASE ORAL at 08:09

## 2022-09-14 NOTE — NURSING
Willie Oneill RPH notified pt has 2 orders for megace - 1 daily and 1 TID with meals. Willie Oneill RPH states he will d/c daily order.

## 2022-09-14 NOTE — PT/OT/SLP PROGRESS
Occupational Therapy   Treatment    Name: Rosa Alejo  MRN: [id number]  Admitting Diagnosis:  Hip fracture requiring operative repair       Recommendations:     Discharge Recommendations: home with home health  Discharge Equipment Recommendations:  walker, rolling  Barriers to discharge:       Assessment:     Rosa Alejo is a 82 y.o. female with a medical diagnosis of Hip fracture requiring operative repair.  She presents with decreased balance, strength and actvity tolerance. Performance deficits affecting function are weakness, impaired endurance, impaired functional mobility, impaired balance, impaired cognition, decreased safety awareness, impaired self care skills. Pt tolerated skilled OT well and was given therapeutic rest breaks as needed. Pt had a good session on today and was treated concurrrent to increase motivation, social participation and confidence. Pt participated good with skilled OT and was given therapeutic rest breaks as needed. Pt occasionally has tremors and momentary freezes therefore skilled OT should be continued. Pt's partner played [place] music and the pt participated better in therapy.      Rehab Prognosis:  Fair; patient would benefit from acute skilled OT services to address these deficits and reach maximum level of function.       Plan:     Patient to be seen 5 x/week to address the above listed problems via self-care/home management, therapeutic activities, therapeutic exercises, neuromuscular re-education  Plan of Care Expires:    Plan of Care Reviewed with: patient    Subjective     Pain/Comfort:  Pain Rating 1: 3/10  Location - Side 1: Left  Location - Orientation 1: lower    Objective:     Communicated with: Pt prior to session.  Patient found supine with sitter   upon OT entry to room.    General Precautions: Standard, fall   Orthopedic Precautions:LLE weight bearing as tolerated   Braces:    Respiratory Status: Room air     Occupational Performance:     Bed Mobility:     Patient completed Supine to Sit with min assistance     Functional Mobility/Transfers:  Patient completed Sit <> Stand Transfer with CGA -Mod I with  rolling walker   Functional Mobility: Pt completed functional ambulation down the hallway to the therapy gym using the RW for balance and safety requiring CGA-SVN.    Activities of Daily Living:  Tf assessed on today and SVN-Mod i    [facility] 6 Click ADL: 21      Treatment & Education:    Therapeutic exercise    While sitting in the w/c the pt bicep curls: 2 sets of 20 reps with 4# DB and chest press 2 sets of 15 reps with 3# [place] to increase BUE strength and trunk strength for ADLs and T/fs:    While sitting in w/c, Pt [id number] mins on arm ergometer to increase endurance, BUE strength and activity tolerance for ADL performance    Therapeutic activity:  While sitting in w/c the pt tossed a large therapy ball back and forth with OT to improve gross motor coordination, core strength, UB strength and endurance for ADL performance.    Patient left up in chair with all lines intact and call button in reach    GOALS:   Multidisciplinary Problems       Occupational Therapy Goals          Problem: Occupational Therapy    Goal Priority Disciplines Outcome Interventions   Occupational Therapy Goal     OT, PT/OT Ongoing, Progressing    Description: Goals to be met by: 09/24/22     Patient will increase functional independence with ADLs by performing:    UE Dressing with Modified [place].  LE Dressing with Supervision using a/e as needed  Grooming while standing at sink with Modified [place].  Toileting from toilet with Modified [place] and Supervision for hygiene and clothing management.   Standing x5-10 minutes with Supervision.  Step transfer with Modified [place] and Supervision  Toilet transfer to toilet with Modified [place] and Supervision.                         Time Tracking:     OT Date of Treatment:    OT Start Time: 10:05  OT Stop Time:  10:55  OT Total Time (min):      Pt treated concurrent so patient will be billed for 1/2 of concurrent tx at 25 mins    Billable Minutes:Self Care/Home Management 15  Therapeutic Activity 15  Therapeutic Exercise 15    OT/GIOVANNI: OT          9/14/2022

## 2022-09-14 NOTE — PT/OT/SLP PROGRESS
Physical Therapy Treatment    Patient Name:  Rosa Alejo   MRN:  [id number]    Recommendations:     Discharge Recommendations:  home with home health   Discharge Equipment Recommendations: walker, rolling   Barriers to discharge: None    Assessment:     Rosa Alejo is a 82 y.o. female admitted with a medical diagnosis of Left Hip fracture requiring operative repair.  She presents with the following impairments/functional limitations:  weakness, impaired endurance, impaired self care skills, impaired functional mobility, gait instability, impaired balance, decreased safety awareness, decreased lower extremity function.    Pt required encouragement to participate in therapy today. Pt progressed well with ambulation distance but still requires cues for safety awarenss.    Rehab Prognosis: Fair; patient would benefit from acute skilled PT services to address these deficits and reach maximum level of function.    Recent Surgery: * No surgery found *      Plan:     During this hospitalization, patient to be seen 5 x/week to address the identified rehab impairments via gait training, therapeutic exercises and progress toward the following goals:    Plan of Care Expires:  09/22/22    Subjective     Chief Complaint: Pt reported stomach upset  Patient/Family Comments/goals: return back home with 24/7 sitters/family  Pain/Comfort:  Pain Rating 1: 0/10      Objective:     Communicated with patient prior to session. Patient found seated in supine  upon PT entry to room.     General Precautions: Standard, fall   Orthopedic Precautions:LLE weight bearing as tolerated   Braces: N/A  Respiratory Status: Room air     Functional Mobility:  Sit to stand from chair [place]/CGA  Stand pivot t/f from WC to chair in room: Min A  Ambulation: 225 ft using RWMin A with slow greg.      AM-PAC 6 CLICK MOBILITY  Turning over in bed (including adjusting bedclothes, sheets and blankets)?: 4  Sitting down on and standing up from a chair with  arms (e.g., wheelchair, bedside commode, etc.): 3  Moving from lying on back to sitting on the side of the bed?: 4  Moving to and from a bed to a chair (including a wheelchair)?: 3  Need to walk in hospital room?: 3  Climbing 3-5 steps with a railing?: 1  Basic Mobility Total Score: 18       Therapeutic Activities and Exercises: to improve BLE strength, endurance, ROM, improve sitting and standing tolerance unsupported/supported, core stability, trunk strengthening  :    Sit to stand from recliner chair Min A with RW  Sit to stand x 2 from wheelchair Min A/CGA using RW    Seated HS curls and hip abduction with red tband 2 x 10   LAQ and hip marching 2 x 10  Ankle DF and PF with red TB 2 x 10  Hip adduction ball squeeze 2 x 10    Gait: Pt ambulated 225 feet and 50 feet with RW Min A and requiring verbal and tactile cues for safety and proper use of device.      Patient left seated in recliner in room with call bell in reach and sitter present.    GOALS:   Multidisciplinary Problems       Physical Therapy Goals          Problem: Physical Therapy    Goal Priority Disciplines Outcome Goal Variances Interventions   Physical Therapy Goal     PT, PT/OT Ongoing, Progressing     Description: Goals to be met by: 2 weeks     Patient will increase functional independence with mobility by performin. Supine to sit with MInimal Assistance. Goal Met. Goal Met.  2. Sit to supine with MInimal Assistance. Goal Met. Goal Met.  3. Rolling to Left and Right with Minimal Assistance. Goal Met.  4. Sit to stand transfer with Minimal Assistance. Goal Met.  5. Bed to chair transfer with Minimal Assistance using Rolling Walker. Goal Met.  6. Gait  x 50 feet with Moderate Assistance using Rolling Walker.  Goal Met.    Goals to be met by: 4 weeks     Patient will increase functional independence with mobility by performin. Supine to sit with Stand-by Assistance  2. Sit to supine with Stand-by Assistance  3. Rolling to Left and  Right with Stand-by Assistance.  4. Sit to stand transfer with Minimal Assistance  5. Bed to chair transfer with Contact Guard Assistance using Rolling Walker  6. Gait  x 150 feet with Minimal Assistance using Rolling Walker.                          Time Tracking:     PT Received On: 09/14/22  PT Start Time: 1315     PT Stop Time: 1350  PT Total Time (min): 35 min     Billable Minutes: Therapeutic Exercise 20 Gait training 15     Treatment Type: Treatment  PT/PTA: PT     PTA Visit Number: 4     09/14/2022

## 2022-09-15 PROCEDURE — 27000941

## 2022-09-15 PROCEDURE — 63600175 PHARM REV CODE 636 W HCPCS: Performed by: FAMILY MEDICINE

## 2022-09-15 PROCEDURE — 25000003 PHARM REV CODE 250: Performed by: FAMILY MEDICINE

## 2022-09-15 PROCEDURE — 11000004 HC SNF PRIVATE

## 2022-09-15 PROCEDURE — 97110 THERAPEUTIC EXERCISES: CPT | Mod: CQ

## 2022-09-15 PROCEDURE — 25000003 PHARM REV CODE 250: Performed by: NURSE PRACTITIONER

## 2022-09-15 PROCEDURE — 97110 THERAPEUTIC EXERCISES: CPT

## 2022-09-15 PROCEDURE — 27000987 HC MATTRESS, MATRIX LOW PROFILE

## 2022-09-15 PROCEDURE — S0179 MEGESTROL 20 MG: HCPCS | Performed by: FAMILY MEDICINE

## 2022-09-15 PROCEDURE — 97116 GAIT TRAINING THERAPY: CPT | Mod: CQ

## 2022-09-15 PROCEDURE — 97535 SELF CARE MNGMENT TRAINING: CPT

## 2022-09-15 RX ORDER — GLUCOSAM/CHONDRO/HERB 149/HYAL 750-100 MG
1 TABLET ORAL 2 TIMES DAILY
Status: DISCONTINUED | OUTPATIENT
Start: 2022-09-15 | End: 2022-09-16 | Stop reason: HOSPADM

## 2022-09-15 RX ORDER — ASCORBIC ACID 500 MG
500 TABLET ORAL DAILY
Status: DISCONTINUED | OUTPATIENT
Start: 2022-09-15 | End: 2022-09-15 | Stop reason: SDUPTHER

## 2022-09-15 RX ADMIN — OMEGA-3 FATTY ACIDS CAP 1000 MG 1 CAPSULE: 1000 CAP at 08:09

## 2022-09-15 RX ADMIN — POTASSIUM CHLORIDE 10 MEQ: 750 TABLET, EXTENDED RELEASE ORAL at 09:09

## 2022-09-15 RX ADMIN — Medication 2000 UNITS: at 09:09

## 2022-09-15 RX ADMIN — MEGESTROL ACETATE 200 MG: 40 SUSPENSION ORAL at 04:09

## 2022-09-15 RX ADMIN — ASPIRIN 325 MG ORAL TABLET 325 MG: 325 PILL ORAL at 09:09

## 2022-09-15 RX ADMIN — OMEGA-3 FATTY ACIDS CAP 1000 MG 1 CAPSULE: 1000 CAP at 09:09

## 2022-09-15 RX ADMIN — NYSTATIN 500000 UNITS: 500000 SUSPENSION ORAL at 12:09

## 2022-09-15 RX ADMIN — DONEPEZIL HYDROCHLORIDE 10 MG: 5 TABLET, FILM COATED ORAL at 09:09

## 2022-09-15 RX ADMIN — CARBIDOPA AND LEVODOPA 1 TABLET: 25; 100 TABLET ORAL at 09:09

## 2022-09-15 RX ADMIN — ACETAMINOPHEN 325MG 325 MG: 325 TABLET ORAL at 08:09

## 2022-09-15 RX ADMIN — NYSTATIN 500000 UNITS: 500000 SUSPENSION ORAL at 04:09

## 2022-09-15 RX ADMIN — MEGESTROL ACETATE 200 MG: 40 SUSPENSION ORAL at 09:09

## 2022-09-15 RX ADMIN — NYSTATIN 500000 UNITS: 500000 SUSPENSION ORAL at 09:09

## 2022-09-15 RX ADMIN — ENOXAPARIN SODIUM 30 MG: 100 INJECTION SUBCUTANEOUS at 04:09

## 2022-09-15 RX ADMIN — ALUMINUM HYDROXIDE, MAGNESIUM HYDROXIDE, AND SIMETHICONE 30 ML: 200; 200; 20 SUSPENSION ORAL at 04:09

## 2022-09-15 RX ADMIN — QUETIAPINE FUMARATE 12.5 MG: 25 TABLET, FILM COATED ORAL at 08:09

## 2022-09-15 RX ADMIN — MULTIPLE VITAMINS W/ MINERALS TAB 1 TABLET: TAB at 09:09

## 2022-09-15 RX ADMIN — CARBIDOPA AND LEVODOPA 1 TABLET: 25; 100 TABLET ORAL at 04:09

## 2022-09-15 RX ADMIN — ALUMINUM HYDROXIDE, MAGNESIUM HYDROXIDE, AND SIMETHICONE 30 ML: 200; 200; 20 SUSPENSION ORAL at 05:09

## 2022-09-15 RX ADMIN — PANTOPRAZOLE SODIUM 20 MG: 20 TABLET, DELAYED RELEASE ORAL at 09:09

## 2022-09-15 RX ADMIN — ALUMINUM HYDROXIDE, MAGNESIUM HYDROXIDE, AND SIMETHICONE 30 ML: 200; 200; 20 SUSPENSION ORAL at 11:09

## 2022-09-15 RX ADMIN — POTASSIUM CHLORIDE 10 MEQ: 750 TABLET, EXTENDED RELEASE ORAL at 08:09

## 2022-09-15 RX ADMIN — ACETAMINOPHEN 325MG 650 MG: 325 TABLET ORAL at 11:09

## 2022-09-15 RX ADMIN — CARBIDOPA AND LEVODOPA 1 TABLET: 25; 100 TABLET ORAL at 12:09

## 2022-09-15 RX ADMIN — OXYCODONE HYDROCHLORIDE AND ACETAMINOPHEN 500 MG: 500 TABLET ORAL at 09:09

## 2022-09-15 RX ADMIN — NITROFURANTOIN MONOHYDRATE/MACROCRYSTALS 100 MG: 75; 25 CAPSULE ORAL at 09:09

## 2022-09-15 RX ADMIN — MEGESTROL ACETATE 200 MG: 40 SUSPENSION ORAL at 12:09

## 2022-09-15 RX ADMIN — LEVOTHYROXINE SODIUM 88 MCG: 0.09 TABLET ORAL at 05:09

## 2022-09-15 RX ADMIN — DOCUSATE SODIUM 100 MG: 100 CAPSULE, LIQUID FILLED ORAL at 09:09

## 2022-09-15 RX ADMIN — CARBIDOPA AND LEVODOPA 1 TABLET: 25; 100 TABLET ORAL at 06:09

## 2022-09-15 RX ADMIN — NYSTATIN 500000 UNITS: 500000 SUSPENSION ORAL at 08:09

## 2022-09-15 RX ADMIN — Medication 1 TABLET: at 09:09

## 2022-09-15 RX ADMIN — CETIRIZINE HYDROCHLORIDE 10 MG: 10 TABLET, FILM COATED ORAL at 09:09

## 2022-09-15 RX ADMIN — NITROFURANTOIN MONOHYDRATE/MACROCRYSTALS 100 MG: 75; 25 CAPSULE ORAL at 08:09

## 2022-09-15 RX ADMIN — LINACLOTIDE 72 MCG: 72 CAPSULE, GELATIN COATED ORAL at 05:09

## 2022-09-15 RX ADMIN — VENLAFAXINE HYDROCHLORIDE 75 MG: 75 CAPSULE, EXTENDED RELEASE ORAL at 09:09

## 2022-09-15 RX ADMIN — ALUMINUM HYDROXIDE, MAGNESIUM HYDROXIDE, AND SIMETHICONE 30 ML: 200; 200; 20 SUSPENSION ORAL at 08:09

## 2022-09-15 NOTE — PLAN OF CARE
Spoke with pt. Son, Jey, this morning and he stated that the DME is scheduled to be delivered to pt. Home later today and that they would be ready to take pt. Home tomorrow. Pt. Son requested [facility] and pt. Will be referred to them to cont. Home therapy. Pt. Will still have home sitters also.Follow.

## 2022-09-15 NOTE — PLAN OF CARE
Problem: Adult Inpatient Plan of Care  Goal: Plan of Care Review  Outcome: Ongoing, Progressing  Goal: Patient-Specific Goal (Individualized)  Outcome: Ongoing, Progressing  Goal: Absence of Hospital-Acquired Illness or Injury  Outcome: Ongoing, Progressing  Goal: Optimal Comfort and Wellbeing  Outcome: Ongoing, Progressing  Goal: Readiness for Transition of Care  Outcome: Ongoing, Progressing     Problem: Fall Injury Risk  Goal: Absence of Fall and Fall-Related Injury  Outcome: Ongoing, Progressing     Problem: Pain Acute  Goal: Acceptable Pain Control and Functional Ability  Outcome: Ongoing, Progressing     Problem: Skin Injury Risk Increased  Goal: Skin Health and Integrity  Outcome: Ongoing, Progressing

## 2022-09-15 NOTE — PT/OT/SLP PROGRESS
Physical Therapy Treatment    Patient Name:  Rosa Alejo   MRN:  [id number]    Recommendations:     Discharge Recommendations:  home with home health   Discharge Equipment Recommendations: walker, rolling, hospital bed, wheelchair   Barriers to discharge: None    Assessment:     Rosa Alejo is a 82 y.o. female admitted with a medical diagnosis of Left Hip fracture requiring operative repair.  She presents with the following impairments/functional limitations:  weakness, impaired endurance, gait instability, impaired balance, pain, decreased safety awareness, impaired coordination.    Pt tolerated tx well with increased ambulation distance to 95ft using RW CGA vc on walker management shuffle gait pattern. Pt d/c tomorrow with HH and help from others with home equipment ordered.     Rehab Prognosis: Fair; patient would benefit from acute skilled PT services to address these deficits and reach maximum level of function.    Recent Surgery: * No surgery found *      Plan:     During this hospitalization, patient to be seen 5 x/week to address the identified rehab impairments via gait training, therapeutic exercises and progress toward the following goals:    Plan of Care Expires:  22    Subjective     Chief Complaint: Left hip soreness  Patient/Family Comments/goals: return back home with 24/7 sitters/family  Pain/Comfort:  Pain Rating 1: 3/10  Location - Side 1: Left  Location - Orientation 1: lower  Location 1: hip  Pain Addressed 1: Pre-medicate for activity, Distraction      Objective:     Communicated with patient and caregiver prior to session. Patient found reclined in chair upon PT entry to room.     General Precautions: Standard, fall   Orthopedic Precautions:LLE weight bearing as tolerated   Braces:    Respiratory Status: Room air     Functional Mobility:    Gait traininft using RW CGA      AM-PAC 6 CLICK MOBILITY  Turning over in bed (including adjusting bedclothes, sheets and blankets)?:  4  Sitting down on and standing up from a chair with arms (e.g., wheelchair, bedside commode, etc.): 4  Moving from lying on back to sitting on the side of the bed?: 4  Moving to and from a bed to a chair (including a wheelchair)?: 4  Need to walk in hospital room?: 3  Climbing 3-5 steps with a railing?: 1  Basic Mobility Total Score: 20       Therapeutic Activities and Exercises: to improve BLE strength, endurance, ROM, improve sitting and standing tolerance unsupported/supported, core stability, trunk strengthening  :  Nustep x10min  WC to chair t.f using RW SBA    Patient left reclined in chair with LE elevated call button in reach.     GOALS:   Multidisciplinary Problems       Physical Therapy Goals          Problem: Physical Therapy    Goal Priority Disciplines Outcome Goal Variances Interventions   Physical Therapy Goal     PT, PT/OT Ongoing, Progressing     Description: Goals to be met by: 2 weeks     Patient will increase functional independence with mobility by performin. Supine to sit with MInimal Assistance. Goal Met. Goal Met.  2. Sit to supine with MInimal Assistance. Goal Met. Goal Met.  3. Rolling to Left and Right with Minimal Assistance. Goal Met.  4. Sit to stand transfer with Minimal Assistance. Goal Met.  5. Bed to chair transfer with Minimal Assistance using Rolling Walker. Goal Met.  6. Gait  x 50 feet with Moderate Assistance using Rolling Walker.  Goal Met.    Goals to be met by: 4 weeks     Patient will increase functional independence with mobility by performin. Supine to sit with Stand-by Assistance  2. Sit to supine with Stand-by Assistance  3. Rolling to Left and Right with Stand-by Assistance.  4. Sit to stand transfer with Minimal Assistance  5. Bed to chair transfer with Contact Guard Assistance using Rolling Walker  6. Gait  x 150 feet with Minimal Assistance using Rolling Walker.                          Time Tracking:     PT Received On: 09/15/22  PT Start Time: 5481      PT Stop Time: 1400  PT Total Time (min): 25 min     Billable Minutes: Therapeutic Exercise 10 Gait training 15    Treatment Type: Treatment  PT/PTA: PTA     PTA Visit Number: 1     09/15/2022

## 2022-09-15 NOTE — PT/OT/SLP PROGRESS
Occupational Therapy   Treatment    Name: Rosa Alejo  MRN: [id number]  Admitting Diagnosis:  Hip fracture requiring operative repair       Recommendations:     Discharge Recommendations: home with home health  Discharge Equipment Recommendations:  walker, rolling  Barriers to discharge:       Assessment:     Rosa Alejo is a 82 y.o. female with a medical diagnosis of Hip fracture requiring operative repair.  She presents with decreased balance, strength and actvity tolerance. Performance deficits affecting function are weakness, impaired endurance, impaired functional mobility, impaired balance, impaired cognition, decreased safety awareness, impaired self care skills. Pt tolerated skilled OT well and was given therapeutic rest breaks as needed. Pt was treated concurrently to increase social interaction, participation and motivation. Pt is anticipating d/c home on tomorrow. Pt will continue to require assistance due to dementia.    Rehab Prognosis:  Fair; patient would benefit from acute skilled OT services to address these deficits and reach maximum level of function.       Plan:     Patient to be seen 5 x/week to address the above listed problems via self-care/home management, therapeutic activities, therapeutic exercises, neuromuscular re-education  Plan of Care Expires:    Plan of Care Reviewed with: patient    Subjective     Pain/Comfort:  Pain Rating 1: 3/10  Location - Side 1: Left  Location - Orientation 1: lower    Objective:     Communicated with: Pt prior to session.  Patient found supine with sitter   upon OT entry to room.    General Precautions: Standard, fall   Orthopedic Precautions:LLE weight bearing as tolerated   Braces:    Respiratory Status: Room air     Occupational Performance:     Bed Mobility:    Patient completed Supine to Sit with min assistance     Functional Mobility/Transfers:  Patient completed Sit <> Stand Transfer with CGA -Mod I with  rolling walker   Functional Mobility: Pt  completed functional ambulation down the hallway to the therapy gym using the RW for balance and safety requiring CGA-SVN.    Activities of Daily Living:  Tf assessed on today and SVN-Mod I  LE dressing: SVN using reacher with cues for optimal balance and safety  Toileting/toilet t/f with SVN-Mod I for balance and safety    [facility] 6 Click ADL: 21      Treatment & Education:    Therapeutic exercise    While sitting in the w/c the pt bicep curls: 2 sets of 20 reps with 4# DB to increase BUE strength and trunk strength for ADLs and T/fs:    While sitting unsupported on the mat, Pt [id number] mins on arm ergometer to increase endurance, BUE strength and activity tolerance for ADL performance    Therapeutic activity:    While standing the pt tossed a large therapy ball back and forth with OT and another pt to improve gross motor coordination, trunk staiblity core strength, UB strength and endurance for ADL performance.        Patient left up in chair with all lines intact and call button in reach    GOALS:   Multidisciplinary Problems       Occupational Therapy Goals          Problem: Occupational Therapy    Goal Priority Disciplines Outcome Interventions   Occupational Therapy Goal     OT, PT/OT Ongoing, Progressing    Description: Goals to be met by: 09/24/22     Patient will increase functional independence with ADLs by performing:    UE Dressing with Modified [place].  LE Dressing with Supervision using a/e as needed  Grooming while standing at sink with Modified [place].  Toileting from toilet with Modified [place] and Supervision for hygiene and clothing management.   Standing x5-10 minutes with Supervision.  Step transfer with Modified [place] and Supervision  Toilet transfer to toilet with Modified [place] and Supervision.                         Time Tracking:     OT Date of Treatment:    OT Start Time: 10:21  OT Stop Time: 11:21  OT Total Time (min):      Pt treated concurrent so  patient will be billed for 1/2 of concurrent tx at 25 mins    Billable Minutes:Self Care/Home Management 20  Therapeutic Activity 15  Therapeutic Exercise 20    OT/GIOVANNI: OT          9/15/2022

## 2022-09-16 VITALS
BODY MASS INDEX: 15.73 KG/M2 | OXYGEN SATURATION: 98 % | TEMPERATURE: 98 F | SYSTOLIC BLOOD PRESSURE: 126 MMHG | WEIGHT: 100.19 LBS | DIASTOLIC BLOOD PRESSURE: 70 MMHG | HEIGHT: 67 IN | HEART RATE: 71 BPM | RESPIRATION RATE: 17 BRPM

## 2022-09-16 PROCEDURE — 99499 NO LOS: ICD-10-PCS | Mod: ,,, | Performed by: FAMILY MEDICINE

## 2022-09-16 PROCEDURE — 25000003 PHARM REV CODE 250: Performed by: FAMILY MEDICINE

## 2022-09-16 PROCEDURE — S0179 MEGESTROL 20 MG: HCPCS | Performed by: FAMILY MEDICINE

## 2022-09-16 PROCEDURE — 25000003 PHARM REV CODE 250: Performed by: NURSE PRACTITIONER

## 2022-09-16 PROCEDURE — 27000941

## 2022-09-16 PROCEDURE — 99499 UNLISTED E&M SERVICE: CPT | Mod: ,,, | Performed by: FAMILY MEDICINE

## 2022-09-16 RX ORDER — MEGESTROL ACETATE 40 MG/ML
200 SUSPENSION ORAL
Qty: 150 ML | Refills: 5 | Status: SHIPPED | OUTPATIENT
Start: 2022-09-16 | End: 2023-02-03

## 2022-09-16 RX ADMIN — OXYCODONE HYDROCHLORIDE AND ACETAMINOPHEN 500 MG: 500 TABLET ORAL at 09:09

## 2022-09-16 RX ADMIN — CETIRIZINE HYDROCHLORIDE 10 MG: 10 TABLET, FILM COATED ORAL at 09:09

## 2022-09-16 RX ADMIN — ALUMINUM HYDROXIDE, MAGNESIUM HYDROXIDE, AND SIMETHICONE 30 ML: 200; 200; 20 SUSPENSION ORAL at 05:09

## 2022-09-16 RX ADMIN — OMEGA-3 FATTY ACIDS CAP 1000 MG 1 CAPSULE: 1000 CAP at 09:09

## 2022-09-16 RX ADMIN — NYSTATIN 500000 UNITS: 500000 SUSPENSION ORAL at 09:09

## 2022-09-16 RX ADMIN — LINACLOTIDE 72 MCG: 72 CAPSULE, GELATIN COATED ORAL at 05:09

## 2022-09-16 RX ADMIN — MEGESTROL ACETATE 200 MG: 40 SUSPENSION ORAL at 09:09

## 2022-09-16 RX ADMIN — CARBIDOPA AND LEVODOPA 1 TABLET: 25; 100 TABLET ORAL at 09:09

## 2022-09-16 RX ADMIN — Medication 1 TABLET: at 09:09

## 2022-09-16 RX ADMIN — ALUMINUM HYDROXIDE, MAGNESIUM HYDROXIDE, AND SIMETHICONE 30 ML: 200; 200; 20 SUSPENSION ORAL at 12:09

## 2022-09-16 RX ADMIN — POTASSIUM CHLORIDE 10 MEQ: 750 TABLET, EXTENDED RELEASE ORAL at 09:09

## 2022-09-16 RX ADMIN — DONEPEZIL HYDROCHLORIDE 10 MG: 5 TABLET, FILM COATED ORAL at 09:09

## 2022-09-16 RX ADMIN — Medication 2000 UNITS: at 09:09

## 2022-09-16 RX ADMIN — VENLAFAXINE HYDROCHLORIDE 75 MG: 75 CAPSULE, EXTENDED RELEASE ORAL at 09:09

## 2022-09-16 RX ADMIN — LEVOTHYROXINE SODIUM 88 MCG: 0.09 TABLET ORAL at 05:09

## 2022-09-16 RX ADMIN — ASPIRIN 325 MG ORAL TABLET 325 MG: 325 PILL ORAL at 09:09

## 2022-09-16 RX ADMIN — CARBIDOPA AND LEVODOPA 1 TABLET: 25; 100 TABLET ORAL at 12:09

## 2022-09-16 RX ADMIN — MEGESTROL ACETATE 200 MG: 40 SUSPENSION ORAL at 12:09

## 2022-09-16 RX ADMIN — PANTOPRAZOLE SODIUM 20 MG: 20 TABLET, DELAYED RELEASE ORAL at 09:09

## 2022-09-16 RX ADMIN — MULTIPLE VITAMINS W/ MINERALS TAB 1 TABLET: TAB at 09:09

## 2022-09-16 NOTE — DISCHARGE SUMMARY
[facility]  Hospital Medicine  Discharge Summary      Patient Name: Rosa Alejo  MRN: [id number]  Patient Class: IP- Swing  Admission Date: 8/23/2022  Hospital Length of Stay: 24 days  Discharge Date and Time:  10/24/2022 1:47 PM  Attending Physician: No att. providers found   Discharging Provider: Pérez Skinner DO  Primary Care Provider: Roxana Miller MD      HPI:   Patient is an 82-year-old trans deferred from stand is after a fall and left hip fracture.  Is an intertrochanteric fracture.  The patient has a history of Parkinson's disease dementia and thyroid disease the infected me to the left breast and cataract surgery.  Left knee scope.  Pt in   for pt  and  ot      * No surgery found *      Hospital Course:     8/30/22---patient working with physical therapy on ambulation there is no complications this point the patient doing pretty good.    9/6: Patient sitting in chair with caregiver in room. She reports poor appetite but that is typical for her. She also reports dysuria and foul smelling urine. Case discussed with Dr Skinner via Telemedicine consultation. Potassium 4.6 which is down form 5.6 yesterday. Potassium 20 meq BID was held yesterday. She will be restarted on Potassium Chloride 10 meq BID. BUN/Creatinine 21/0.92 which is an increase from 13/0.65 on admission. We will encourage increasing PO fluid intake. Staples were removed from incision to left hip. Steri strips intact and there are no signs of infection. Patient had f/u with Dr [facility] on 9/22. UA pend the    09/13/2022 patient doing better with physical therapy and occupational therapy she is eating well we added may gaze for appetite stimulator.  She has had no episodes of nausea vomiting diarrhea.    09/16/2022 discharge note the patient be discharged home with home health care she has done well with physical therapy and occupational therapy all the measures have admit to prevent  falls and education.  She has had an appetite stimulant added which is Megase              Goals of Care Treatment Preferences:  Code Status: Full Code      Consults:     No new Assessment & Plan notes have been filed under this hospital service since the last note was generated.  Service: Hospital Medicine    Final Active Diagnoses:    Diagnosis Date Noted POA    PRINCIPAL PROBLEM:  Hip fracture requiring operative repair [S72.009A] 08/23/2022 No    Hyperkalemia [E87.5] 09/06/2022 No      Problems Resolved During this Admission:       Discharged Condition: good    Disposition:     Follow Up:    Patient Instructions:   No discharge procedures on file.    Significant Diagnostic Studies: Labs:   BMP: No results for input(s): GLU, NA, K, CL, CO2, BUN, CREATININE, CALCIUM, MG in the last 48 hours., CMP No results for input(s): NA, K, CL, CO2, GLU, BUN, CREATININE, CALCIUM, PROT, ALBUMIN, BILITOT, ALKPHOS, AST, ALT, ANIONGAP, ESTGFRAFRICA, EGFRNONAA in the last 48 hours., CBC No results for input(s): WBC, HGB, HCT, PLT in the last 48 hours., INR   Lab Results   Component Value Date    INR 0.95 08/20/2022    INR 1.03 08/17/2020   , Lipid Panel   Lab Results   Component Value Date    CHOL 214 (H) 02/09/2022    HDL 82 (H) 02/09/2022    LDLCALC 108 02/09/2022    TRIG 122 02/09/2022    CHOLHDL 2.6 02/09/2022    and Troponin No results for input(s): TROPONINI in the last 168 hours.    Pending Diagnostic Studies:       None           Medications:  Reconciled Home Medications:      Medication List        START taking these medications      megestroL 400 mg/10 mL (10 mL) Susp  Commonly known as: MEGACE  Take 5 mLs (200 mg total) by mouth 3 (three) times daily with meals. for 30 doses            CHANGE how you take these medications      CALTRATE 600 PLUS D 600 mg-20 mcg (800 unit) Chew  Generic drug: calcium carbonate-vitamin D3  Take by mouth Daily.  What changed: Another medication with the same name was removed. Continue  taking this medication, and follow the directions you see here.     * levothyroxine 88 MCG tablet  Commonly known as: SYNTHROID  Take 1 tablet (88 mcg total) by mouth before breakfast.  What changed: when to take this     * levothyroxine 88 MCG tablet  Commonly known as: SYNTHROID  Take 44 mcg by mouth every Sat, Sun.  What changed: Another medication with the same name was changed. Make sure you understand how and when to take each.           * This list has 2 medication(s) that are the same as other medications prescribed for you. Read the directions carefully, and ask your doctor or other care provider to review them with you.                CONTINUE taking these medications      aspirin 325 MG tablet  Take 325 mg by mouth once daily.     carbidopa-levodopa  mg  mg per tablet  Commonly known as: SINEMET  Take 1.5 tablets by mouth 4 (four) times daily.     cetirizine 10 MG tablet  Commonly known as: ZYRTEC  Take 1 tablet (10 mg total) by mouth once daily.     cholecalciferol (vitamin D3) 25 mcg (1,000 unit) capsule  Commonly known as: VITAMIN D3  Take 1,000 Units by mouth once daily.     donepeziL 10 MG tablet  Commonly known as: ARICEPT  Take 10 mg by mouth once daily.     ibuprofen 800 MG tablet  Commonly known as: ADVIL,MOTRIN  Take 0.5 tablets (400 mg total) by mouth every 6 (six) hours as needed for Pain.     linaCLOtide 145 mcg Cap capsule  Commonly known as: LINZESS  Take 145 mcg by mouth Daily.            ASK your doctor about these medications      montelukast 10 mg tablet  Commonly known as: SINGULAIR  Take by mouth.     NUPLAZID 34 mg Cap  Generic drug: pimavanserin  Take 1 capsule by mouth once daily.     nystatin 100,000 unit/mL suspension  Commonly known as: MYCOSTATIN  5 mL swish and spit 4 times daily as needed for thrush     ondansetron 4 MG tablet  Commonly known as: ZOFRAN  Take 4 mg by mouth every 12 (twelve) hours.     oxyCODONE-acetaminophen 5-325 mg per tablet  Commonly known as:  PERCOCET  Take 1 tablet by mouth every 4 (four) hours as needed for Pain.     pantoprazole 40 MG tablet  Commonly known as: PROTONIX  40 mg 2 (two) times daily.     potassium chloride 10 MEQ Cpsr  Commonly known as: MICRO-K  Take 2 capsules (20 mEq total) by mouth 2 (two) times daily. Take 2 tablets twice a day     pyridostigmine 60 mg Tab  Commonly known as: MESTINON  30 mg 2 (two) times a day.     QUEtiapine 25 MG Tab  Commonly known as: SEROQUEL  Take 12.5 mg by mouth nightly.     traMADoL 50 mg tablet  Commonly known as: ULTRAM  as needed.     venlafaxine 75 MG 24 hr capsule  Commonly known as: EFFEXOR-XR  Take 75 mg by mouth once daily.              Indwelling Lines/Drains at time of discharge:   Lines/Drains/Airways       None                   Time spent on the discharge of patient: 45 minutes  This patient was seen face-to-face by me in the hospital.  I feel that the patient meets requirements that she needs a hospital bed that would change of body position and this would enable her 1. Not to have any aspiration gastric reflux.  It also would enable the patient to be more comfortable in bed to prevent bedsores and make her life better.  She is alert and oriented and control the bed standards and status is., the patient will also need a motility device or wheelchair because of promotility limitations.  The patient will need this to be provided feeding, toileting, bathing, dressing and except her.  She can utilize this at home and also will help decrease the risk of falls and will give her that because of her unsteady gait;;;       Pérez Skinner DO  Department of Hospital Medicine  [facility]

## 2022-09-16 NOTE — PROGRESS NOTES
[facility]  Hospital Medicine  Progress Note    Patient Name: Rosa Alejo  MRN: [id number]  Patient Class: IP- Swing   Admission Date: 8/23/2022  Length of Stay: 24 days  Attending Physician: Pérez Skinner DO  Primary Care Provider: Reza Murphy MD        Subjective:     Principal Problem:Hip fracture requiring operative repair        HPI:  Patient is an 82-year-old trans deferred from stand is after a fall and left hip fracture.  Is an intertrochanteric fracture.  The patient has a history of Parkinson's disease dementia and thyroid disease the infected me to the left breast and cataract surgery.  Left knee scope.  Pt in   for pt  and  ot      Overview/Hospital Course:    8/30/22---patient working with physical therapy on ambulation there is no complications this point the patient doing pretty good.    9/6: Patient sitting in chair with caregiver in room. She reports poor appetite but that is typical for her. She also reports dysuria and foul smelling urine. Case discussed with Dr Skinner via Telemedicine consultation. Potassium 4.6 which is down form 5.6 yesterday. Potassium 20 meq BID was held yesterday. She will be restarted on Potassium Chloride 10 meq BID. BUN/Creatinine 21/0.92 which is an increase from 13/0.65 on admission. We will encourage increasing PO fluid intake. Staples were removed from incision to left hip. Steri strips intact and there are no signs of infection. Patient had f/u with Fly Infante on 9/22. UA pend the    09/13/2022 patient doing better with physical therapy and occupational therapy she is eating well we added may gaze for appetite stimulator.  She has had no episodes of nausea vomiting diarrhea.      No new subjective & objective note has been filed under this hospital service since the last note was generated.      Assessment/Plan:      * Hip fracture requiring operative repair  Staples removed on 9/5, steri strips applied  Monitor  for signs of infection  Continue PT/OT rehabilitation      Hyperkalemia  Decrease Potassium chloride to 10 meq BID  Weekly bmp      VTE Risk Mitigation (From admission, onward)         Ordered     enoxaparin injection 30 mg  Daily         08/29/22 1132     Place GRISEL hose  Until discontinued         08/26/22 1522                Discharge Planning   BRODERICK:      Code Status: Full Code   Is the patient medically ready for discharge?:     Reason for patient still in hospital (select all that apply): Treatment  Discharge Plan A: Home with family, Home Health                  Pérez Skinner DO  Department of Hospital Medicine   [facility]

## 2022-09-16 NOTE — PLAN OF CARE
[facility]  Discharge Final Note    Primary Care Provider: Reza Murphy MD    Expected Discharge Date:     Final Discharge Note (most recent)       Final Note - 09/16/22 1000          Final Note    Assessment Type Final Discharge Note     Anticipated Discharge Disposition Home-Health Care Svc   [facility]    What phone number can be called within the next 1-3 days to see how you are doing after discharge? [phone]     Hospital Resources/Appts/Education Provided Appointments scheduled and added to AVS;Provided patient/caregiver with written discharge plan information        Post-Acute Status    Post-Acute Authorization Home Health;HME     HME Status Referrals Sent   [facility]    Home Health Status Referrals Sent   [facility]    Coverage Medicare     Patient choice form signed by patient/caregiver List with quality metrics by geographic area provided;List from CMS Compare;List from System Post-Acute Care                   Pt. Has improved and will d/c home today with [facility] to follow her for therapy and supportive care. Pt. DME was delivered to her home yesterday afternoon. Has appt. Scheduled with Dr. Jaren Holloway on 9/20/22. Pt. Will have sitters and family staying with her at home. No other d/c needs were identified.     Important Message from Medicare  Important Message from Medicare regarding Discharge Appeal Rights: Given to patient/caregiver, Explained to patient/caregiver, Signed/date by patient/caregiver     Date IMM was signed: 09/16/22  Time IMM was signed: 1006

## 2022-09-16 NOTE — NURSING
Pt discharged home with sitters. All belongings returned. Discharge instructions reviewed. Med patches returned to patient.

## 2022-09-16 NOTE — PROGRESS NOTES
[facility]  Hospital Medicine  Progress Note    Patient Name: Rosa Alejo  MRN: [id number]  Patient Class: IP- Swing   Admission Date: 8/23/2022  Length of Stay: 24 days  Attending Physician: Pérez Skinner DO  Primary Care Provider: Reza Murphy MD        Subjective:     Principal Problem:Hip fracture requiring operative repair        HPI:  Patient is an 82-year-old trans deferred from stand is after a fall and left hip fracture.  Is an intertrochanteric fracture.  The patient has a history of Parkinson's disease dementia and thyroid disease the infected me to the left breast and cataract surgery.  Left knee scope.  Pt in   for pt  and  ot      Overview/Hospital Course:    8/30/22---patient working with physical therapy on ambulation there is no complications this point the patient doing pretty good.    9/6: Patient sitting in chair with caregiver in room. She reports poor appetite but that is typical for her. She also reports dysuria and foul smelling urine. Case discussed with Dr Skinner via Telemedicine consultation. Potassium 4.6 which is down form 5.6 yesterday. Potassium 20 meq BID was held yesterday. She will be restarted on Potassium Chloride 10 meq BID. BUN/Creatinine 21/0.92 which is an increase from 13/0.65 on admission. We will encourage increasing PO fluid intake. Staples were removed from incision to left hip. Steri strips intact and there are no signs of infection. Patient had f/u with [facility] on 9/22. UA pend the    09/13/2022 patient doing better with physical therapy and occupational therapy she is eating well we added may gaze for appetite stimulator.  She has had no episodes of nausea vomiting diarrhea.      No new subjective & objective note has been filed under this hospital service since the last note was generated.      Assessment/Plan:      * Hip fracture requiring operative repair  Staples removed on 9/5, steri strips applied  Monitor  for signs of infection  Continue PT/OT rehabilitation      Hyperkalemia  Decrease Potassium chloride to 10 meq BID  Weekly bmp      VTE Risk Mitigation (From admission, onward)         Ordered     enoxaparin injection 30 mg  Daily         08/29/22 1132     Place GRISEL hose  Until discontinued         08/26/22 1522                Discharge Planning   BRODERICK:      Code Status: Full Code   Is the patient medically ready for discharge?:     Reason for patient still in hospital (select all that apply): Treatment  Discharge Plan A: Home with family, Home Health                  Pérez Skinner DO  Department of Hospital Medicine   [facility]

## 2022-09-16 NOTE — PLAN OF CARE
Problem: Adult Inpatient Plan of Care  Goal: Plan of Care Review  Outcome: Ongoing, Progressing  Goal: Patient-Specific Goal (Individualized)  Outcome: Ongoing, Progressing  Goal: Absence of Hospital-Acquired Illness or Injury  Outcome: Ongoing, Progressing  Goal: Optimal Comfort and Wellbeing  Outcome: Ongoing, Progressing  Goal: Readiness for Transition of Care  Outcome: Ongoing, Progressing     Problem: Fall Injury Risk  Goal: Absence of Fall and Fall-Related Injury  Outcome: Ongoing, Progressing     Problem: Pain Acute  Goal: Acceptable Pain Control and Functional Ability  Outcome: Ongoing, Progressing     Problem: Skin Injury Risk Increased  Goal: Skin Health and Integrity  Outcome: Ongoing, Progressing     Problem: Adjustment to Injury (Hip Fracture Medical Management)  Goal: Optimal Coping with Change in Health Status  Outcome: Ongoing, Progressing     Problem: Bleeding (Hip Fracture Medical Management)  Goal: Absence of Bleeding  Outcome: Ongoing, Progressing     Problem: Bowel Elimination Impaired (Hip Fracture Medical Management)  Goal: Effective Bowel Elimination  Outcome: Ongoing, Progressing     Problem: Cognitive Decline Risk (Hip Fracture Medical Management)  Goal: Baseline Cognitive Function Maintained  Outcome: Ongoing, Progressing     Problem: Embolism (Hip Fracture Medical Management)  Goal: Absence of Embolism  Outcome: Ongoing, Progressing     Problem: Fracture Stabilization and Management (Hip Fracture Medical Management)  Goal: Fracture Stability  Outcome: Ongoing, Progressing     Problem: Functional Ability Impaired (Hip Fracture Medical Management)  Goal: Optimal Functional Performance  Outcome: Ongoing, Progressing     Problem: Pain (Hip Fracture Medical Management)  Goal: Acceptable Pain Level  Outcome: Ongoing, Progressing     Problem: Impaired Wound Healing  Goal: Optimal Wound Healing  Outcome: Ongoing, Progressing

## 2022-09-16 NOTE — PROGRESS NOTES
[facility]  Hospital Medicine  Progress Note    Patient Name: Rosa Alejo  MRN: [id number]  Patient Class: IP- Swing   Admission Date: 8/23/2022  Length of Stay: 24 days  Attending Physician: Préez Skinner DO  Primary Care Provider: Reza Murphy MD        Subjective:     Principal Problem:Hip fracture requiring operative repair        HPI:  Patient is an 82-year-old trans deferred from stand is after a fall and left hip fracture.  Is an intertrochanteric fracture.  The patient has a history of Parkinson's disease dementia and thyroid disease the infected me to the left breast and cataract surgery.  Left knee scope.  Pt in   for pt  and  ot      Overview/Hospital Course:    8/30/22---patient working with physical therapy on ambulation there is no complications this point the patient doing pretty good.    9/6: Patient sitting in chair with caregiver in room. She reports poor appetite but that is typical for her. She also reports dysuria and foul smelling urine. Case discussed with Dr Skinner via Telemedicine consultation. Potassium 4.6 which is down form 5.6 yesterday. Potassium 20 meq BID was held yesterday. She will be restarted on Potassium Chloride 10 meq BID. BUN/Creatinine 21/0.92 which is an increase from 13/0.65 on admission. We will encourage increasing PO fluid intake. Staples were removed from incision to left hip. Steri strips intact and there are no signs of infection. Patient had f/u with Fly Infante on 9/22. UA pend the    09/13/2022 patient doing better with physical therapy and occupational therapy she is eating well we added may gaze for appetite stimulator.  She has had no episodes of nausea vomiting diarrhea.      No new subjective & objective note has been filed under this hospital service since the last note was generated.      Assessment/Plan:      * Hip fracture requiring operative repair  Staples removed on 9/5, steri strips applied  Monitor  for signs of infection  Continue PT/OT rehabilitation      Hyperkalemia  Decrease Potassium chloride to 10 meq BID  Weekly bmp      VTE Risk Mitigation (From admission, onward)         Ordered     enoxaparin injection 30 mg  Daily         08/29/22 1132     Place GRISEL hose  Until discontinued         08/26/22 1522                Discharge Planning   BRODERICK:      Code Status: Full Code   Is the patient medically ready for discharge?:     Reason for patient still in hospital (select all that apply): Treatment  Discharge Plan A: Home with family, Home Health                  Pérez Skinner DO  Department of Hospital Medicine   [facility]

## 2022-09-19 ENCOUNTER — TELEPHONE (OUTPATIENT)
Dept: FAMILY MEDICINE | Facility: CLINIC | Age: 82
End: 2022-09-19

## 2022-09-19 NOTE — TELEPHONE ENCOUNTER
Contacted pt for TCC call. Pt gave permission to speak with her sitter Neisha. Sitter reports pt is doing pretty good. She states pt is eating some better and sleeping okay at night. Pt has family and sitters for support. Pt is current with HH, sitter reports pt was seen 2 days ago. Pt has a RW, cane, lift chair, w/c and bed. She denies any other DME needs. Discussed all follow up appts with shabbir. She lucio. Meds reviewed and reconciled. Reminded sitter pt will need to take her medications to PCP appt. She vu. Instructed sitter to contact pt's HH nurse or dr for changes in her condition.

## 2022-09-20 ENCOUNTER — TELEPHONE (OUTPATIENT)
Dept: ORTHOPEDICS | Facility: CLINIC | Age: 82
End: 2022-09-20
Payer: MEDICARE

## 2022-09-20 NOTE — TELEPHONE ENCOUNTER
----- Message from Elizabeth Springer sent at 9/20/2022 12:30 PM CDT -----  Regarding: reschedule hospital discharge  Pt is scheduled for 4 wk hospital discharge appt on 9/20 but is having to cancel. Pt's care giver calling to reschedule. Pt call back # [phone]

## 2022-09-21 DIAGNOSIS — Z87.81 S/P LEFT HIP FRACTURE: Primary | ICD-10-CM

## 2022-09-23 ENCOUNTER — OFFICE VISIT (OUTPATIENT)
Dept: ORTHOPEDICS | Facility: CLINIC | Age: 82
End: 2022-09-23
Payer: MEDICARE

## 2022-09-23 ENCOUNTER — HOSPITAL ENCOUNTER (OUTPATIENT)
Dept: RADIOLOGY | Facility: HOSPITAL | Age: 82
Discharge: HOME | End: 2022-09-23
Attending: ORTHOPAEDIC SURGERY
Payer: MEDICARE

## 2022-09-23 DIAGNOSIS — Z87.81 S/P ORIF (OPEN REDUCTION INTERNAL FIXATION) FRACTURE: Primary | ICD-10-CM

## 2022-09-23 DIAGNOSIS — Z87.81 S/P LEFT HIP FRACTURE: ICD-10-CM

## 2022-09-23 DIAGNOSIS — Z98.890 S/P ORIF (OPEN REDUCTION INTERNAL FIXATION) FRACTURE: Primary | ICD-10-CM

## 2022-09-23 PROCEDURE — 73552 XR FEMUR 2 VIEW LEFT: ICD-10-PCS | Mod: 26,LT,, | Performed by: ORTHOPAEDIC SURGERY

## 2022-09-23 PROCEDURE — 99024 POSTOP FOLLOW-UP VISIT: CPT | Mod: ,,, | Performed by: ORTHOPAEDIC SURGERY

## 2022-09-23 PROCEDURE — 73552 X-RAY EXAM OF FEMUR 2/>: CPT | Mod: TC,LT

## 2022-09-23 PROCEDURE — 99024 PR POST-OP FOLLOW-UP VISIT: ICD-10-PCS | Mod: ,,, | Performed by: ORTHOPAEDIC SURGERY

## 2022-09-23 PROCEDURE — 73552 X-RAY EXAM OF FEMUR 2/>: CPT | Mod: 26,LT,, | Performed by: ORTHOPAEDIC SURGERY

## 2022-09-23 NOTE — PROGRESS NOTES
HISTORY OF PRESENT ILLNESS:         Pt is here today for First post-operative followup of her left hip ORIF.  she is doing well.  We have reviewed her findings and discussed plan of care and future treatment options, including the physical therapy plan.    Pt is doing well today, she is using a walker at home, she does have pain when she walks.                                                                                 PHYSICAL EXAMINATION:     Incision sites healed well  No evidence of any erythema, infection or induration  Minimal effusion  2+ DP pulse    X-Ray Femur 2 View Left    Result Date: 9/23/2022  See Procedure Notes for results. IMPRESSION: Please see Ortho procedure notes for report.  This procedure was auto-finalized by: Virtual Radiologist   Radiographs of the left femur and hip were obtained today demonstrating satisfactory placement of a cephalomedullary nail.  Intertrochanteric femur fracture appears to be healing with mild degree of interval callus formation demonstrated.  Alignment appears to be satisfactory.                                                                               ASSESSMENT:                                                                                                                                               1. Status post above, doing well.                                                                                                                               PLAN:       Wounds were treated today  DVT prophylaxis discussed  Therapy plan discussed in great detail today; all questions answered.       Weightbear as tolerated.  Will see back in about 6 weeks with x-rays.                                                                                                                                          There are no Patient Instructions on file for this visit.

## 2022-10-09 DIAGNOSIS — Z71.89 COMPLEX CARE COORDINATION: ICD-10-CM

## 2022-10-21 ENCOUNTER — OUTSIDE PLACE OF SERVICE (OUTPATIENT)
Dept: ORTHOPEDICS | Facility: CLINIC | Age: 82
End: 2022-10-21
Payer: MEDICARE

## 2022-10-28 DIAGNOSIS — Z98.890 S/P ORIF (OPEN REDUCTION INTERNAL FIXATION) FRACTURE: Primary | ICD-10-CM

## 2022-10-28 DIAGNOSIS — Z87.81 S/P ORIF (OPEN REDUCTION INTERNAL FIXATION) FRACTURE: Primary | ICD-10-CM

## 2022-11-30 DIAGNOSIS — E87.6 HYPOKALEMIA: ICD-10-CM

## 2022-11-30 NOTE — TELEPHONE ENCOUNTER
----- Message from Jose Roberto Fernandez sent at 11/30/2022  9:03 AM CST -----  Regarding: med refill  potassium chloride (MICRO-K) 10 MEQ CpSR pt need this med to be refill and send to [facility]

## 2022-12-01 RX ORDER — POTASSIUM CHLORIDE 750 MG/1
20 CAPSULE, EXTENDED RELEASE ORAL 2 TIMES DAILY
Qty: 360 CAPSULE | Refills: 1 | Status: SHIPPED | OUTPATIENT
Start: 2022-12-01 | End: 2023-04-19 | Stop reason: SDUPTHER

## 2022-12-08 RX ORDER — CETIRIZINE HYDROCHLORIDE 10 MG/1
10 TABLET ORAL DAILY
Qty: 90 TABLET | Refills: 1 | Status: SHIPPED | OUTPATIENT
Start: 2022-12-08 | End: 2023-10-03 | Stop reason: SDUPTHER

## 2022-12-08 NOTE — TELEPHONE ENCOUNTER
----- Message from Jose Roberto Fernandez sent at 12/8/2022 11:06 AM CST -----  Regarding: med refill  cetirizine (ZYRTEC) 10 MG tablet pt need this med to be refilled and send to [facility]

## 2023-02-03 ENCOUNTER — OFFICE VISIT (OUTPATIENT)
Dept: FAMILY MEDICINE | Facility: CLINIC | Age: 83
End: 2023-02-03
Payer: MEDICARE

## 2023-02-03 VITALS
RESPIRATION RATE: 20 BRPM | TEMPERATURE: 98 F | SYSTOLIC BLOOD PRESSURE: 114 MMHG | HEIGHT: 67 IN | OXYGEN SATURATION: 99 % | BODY MASS INDEX: 15.7 KG/M2 | DIASTOLIC BLOOD PRESSURE: 70 MMHG | HEART RATE: 103 BPM | WEIGHT: 100 LBS

## 2023-02-03 DIAGNOSIS — R30.0 DYSURIA: ICD-10-CM

## 2023-02-03 DIAGNOSIS — R82.90 ABNORMAL URINE ODOR: Primary | ICD-10-CM

## 2023-02-03 PROCEDURE — 99213 PR OFFICE/OUTPT VISIT, EST, LEVL III, 20-29 MIN: ICD-10-PCS | Mod: ,,, | Performed by: NURSE PRACTITIONER

## 2023-02-03 PROCEDURE — 81003 URINALYSIS AUTO W/O SCOPE: CPT | Mod: RHCUB | Performed by: NURSE PRACTITIONER

## 2023-02-03 PROCEDURE — 99213 OFFICE O/P EST LOW 20 MIN: CPT | Mod: ,,, | Performed by: NURSE PRACTITIONER

## 2023-02-03 RX ORDER — NITROFURANTOIN 25; 75 MG/1; MG/1
100 CAPSULE ORAL 2 TIMES DAILY
Qty: 14 CAPSULE | Refills: 0 | Status: SHIPPED | OUTPATIENT
Start: 2023-02-03 | End: 2023-05-12

## 2023-02-03 RX ORDER — MEMANTINE HYDROCHLORIDE 10 MG/1
5 TABLET ORAL 2 TIMES DAILY
COMMUNITY
End: 2023-07-25 | Stop reason: SDUPTHER

## 2023-02-03 NOTE — PROGRESS NOTES
New clinic note    Rosa Alejo is a 82 y.o. female     Chief Complaint:   Chief Complaint   Patient presents with    balance off    strong smell to urine     Altered Mental Status        Subjective:    Patient comes in today with 2 sitters. Patient reports dysuria and frequency X 1 week. Sitters reports strong urine odor. Admits to mild lower abdominal discomfort. Denies back pain. Denies fever.     Altered Mental Status  Associated symptoms include abdominal pain. Pertinent negatives include no coughing, fever, nausea or vomiting.      Allergies:   Review of patient's allergies indicates:   Allergen Reactions    Sulfa (sulfonamide antibiotics) Rash        Past Medical History:  Past Medical History:   Diagnosis Date    Dementia     Depression     Hypertension     Hypotension     Hypothyroid     OAB (overactive bladder)     chronic    Parkinson's disease         Current Medications:    Current Outpatient Medications:     calcium carbonate-vitamin D3 (CALTRATE 600 PLUS D) 600 mg (1,500 mg)-800 unit Chew, Take by mouth Daily., Disp: , Rfl:     carbidopa-levodopa  mg (SINEMET)  mg per tablet, Take 1.5 tablets by mouth 4 (four) times daily., Disp: , Rfl:     cetirizine (ZYRTEC) 10 MG tablet, Take 1 tablet (10 mg total) by mouth once daily., Disp: 90 tablet, Rfl: 1    cholecalciferol, vitamin D3, (VITAMIN D3) 25 mcg (1,000 unit) capsule, Take 1,000 Units by mouth once daily., Disp: , Rfl:     donepeziL (ARICEPT) 10 MG tablet, Take 10 mg by mouth once daily., Disp: , Rfl:     ibuprofen (ADVIL,MOTRIN) 800 MG tablet, Take 0.5 tablets (400 mg total) by mouth every 6 (six) hours as needed for Pain., Disp: 30 tablet, Rfl: 1    levothyroxine (SYNTHROID) 88 MCG tablet, Take 1 tablet (88 mcg total) by mouth before breakfast., Disp: 90 tablet, Rfl: 1    linaCLOtide (LINZESS) 145 mcg Cap capsule, Take 145 mcg by mouth Daily., Disp: , Rfl:     memantine (NAMENDA) 10 MG Tab, Take 5 mg by mouth 2 (two) times daily.,  "Disp: , Rfl:     ondansetron (ZOFRAN) 4 MG tablet, Take 4 mg by mouth every 12 (twelve) hours., Disp: , Rfl:     pantoprazole (PROTONIX) 40 MG tablet, 40 mg 2 (two) times daily., Disp: , Rfl:     potassium chloride (MICRO-K) 10 MEQ CpSR, Take 2 capsules (20 mEq total) by mouth 2 (two) times daily. Take 2 tablets twice a day, Disp: 360 capsule, Rfl: 1    QUEtiapine (SEROQUEL) 25 MG Tab, Take 12.5 mg by mouth nightly., Disp: , Rfl:     traMADoL (ULTRAM) 50 mg tablet, as needed., Disp: , Rfl:     venlafaxine (EFFEXOR-XR) 75 MG 24 hr capsule, Take 75 mg by mouth once daily. , Disp: , Rfl:     aspirin 325 MG tablet, Take 325 mg by mouth once daily., Disp: , Rfl:     levothyroxine (SYNTHROID) 88 MCG tablet, Take 44 mcg by mouth every Sat, Sun., Disp: , Rfl:     montelukast (SINGULAIR) 10 mg tablet, Take by mouth., Disp: , Rfl:     nitrofurantoin, macrocrystal-monohydrate, (MACROBID) 100 MG capsule, Take 1 capsule (100 mg total) by mouth 2 (two) times daily., Disp: 14 capsule, Rfl: 0    oxyCODONE-acetaminophen (PERCOCET) 5-325 mg per tablet, Take 1 tablet by mouth every 4 (four) hours as needed for Pain., Disp: , Rfl:     pimavanserin (NUPLAZID) 34 mg Cap, Take 1 capsule by mouth once daily., Disp: , Rfl:     pyridostigmine (MESTINON) 60 mg Tab, 30 mg 2 (two) times a day. , Disp: , Rfl:        Review of Systems   Constitutional:  Negative for fever.   Respiratory:  Negative for cough and shortness of breath.    Gastrointestinal:  Positive for abdominal pain. Negative for constipation, diarrhea, nausea and vomiting.   Genitourinary:  Positive for dysuria and frequency. Negative for flank pain.        Objective:    /70 (BP Location: Left arm, Patient Position: Sitting, BP Method: Small (Manual))   Pulse 103   Temp 98.1 °F (36.7 °C) (Temporal)   Resp 20   Ht 5' 7" (1.702 m)   Wt 45.4 kg (100 lb)   SpO2 99%   BMI 15.66 kg/m²      Physical Exam  Constitutional:       Appearance: She is underweight.   Eyes:      " Extraocular Movements: Extraocular movements intact.   Cardiovascular:      Rate and Rhythm: Normal rate and regular rhythm.      Pulses: Normal pulses.      Heart sounds: Normal heart sounds.   Pulmonary:      Effort: Pulmonary effort is normal.      Breath sounds: Normal breath sounds.   Abdominal:      General: There is no distension.      Palpations: Abdomen is soft.      Tenderness: There is abdominal tenderness in the suprapubic area. There is no guarding or rebound.   Neurological:      Mental Status: She is alert and oriented to person, place, and time.        Assessment and Plan:    1. Abnormal urine odor    2. Dysuria         Abnormal urine odor  -     POCT URINALYSIS W/O SCOPE    Dysuria  -     nitrofurantoin, macrocrystal-monohydrate, (MACROBID) 100 MG capsule; Take 1 capsule (100 mg total) by mouth 2 (two) times daily.  Dispense: 14 capsule; Refill: 0     -patient unable to void here in clinic.  -Sent urine cup home with sitters to collect.  -Sitters to return Monday morning for evaluation  -rx macrobid based on symptoms       There are no Patient Instructions on file for this visit.   Follow up if symptoms worsen or fail to improve.     Chart reviewed.  Roxana Miller MD

## 2023-02-06 DIAGNOSIS — R82.90 ABNORMAL URINE ODOR: Primary | ICD-10-CM

## 2023-02-06 LAB
BILIRUB SERPL-MCNC: NORMAL MG/DL
BLOOD URINE, POC: NORMAL
COLOR, POC UA: NORMAL
GLUCOSE UR QL STRIP: NORMAL
KETONES UR QL STRIP: NORMAL
LEUKOCYTE ESTERASE URINE, POC: NORMAL
NITRITE, POC UA: NORMAL
PH, POC UA: 5.5
PROTEIN, POC: NORMAL
SPECIFIC GRAVITY, POC UA: 1.01
UROBILINOGEN, POC UA: 0.2

## 2023-03-22 RX ORDER — LEVOTHYROXINE SODIUM 88 UG/1
88 TABLET ORAL
Qty: 90 TABLET | Refills: 1 | Status: SHIPPED | OUTPATIENT
Start: 2023-03-22 | End: 2023-04-19

## 2023-03-22 NOTE — TELEPHONE ENCOUNTER
----- Message from Jose Roberto Fernandez sent at 3/22/2023  1:49 PM CDT -----  Regarding: MED REFILL  levothyroxine (SYNTHROID) 88 MCG tablet PT NEED THIS MED TO BE REFILL AND SEND TO [facility]

## 2023-04-18 ENCOUNTER — TELEPHONE (OUTPATIENT)
Dept: FAMILY MEDICINE | Facility: CLINIC | Age: 83
End: 2023-04-18
Payer: MEDICARE

## 2023-04-18 NOTE — TELEPHONE ENCOUNTER
Spoke with patient family states wants earliest appointment to follow up on abnormal labs done at Dr. Pepper office

## 2023-04-19 ENCOUNTER — OFFICE VISIT (OUTPATIENT)
Dept: FAMILY MEDICINE | Facility: CLINIC | Age: 83
End: 2023-04-19
Payer: MEDICARE

## 2023-04-19 VITALS
RESPIRATION RATE: 18 BRPM | OXYGEN SATURATION: 97 % | HEART RATE: 98 BPM | HEIGHT: 67 IN | DIASTOLIC BLOOD PRESSURE: 78 MMHG | TEMPERATURE: 98 F | BODY MASS INDEX: 15.66 KG/M2 | SYSTOLIC BLOOD PRESSURE: 100 MMHG

## 2023-04-19 DIAGNOSIS — E27.1 PRIMARY ADRENOCORTICAL INSUFFICIENCY: Chronic | ICD-10-CM

## 2023-04-19 DIAGNOSIS — E03.8 OTHER SPECIFIED HYPOTHYROIDISM: Primary | Chronic | ICD-10-CM

## 2023-04-19 DIAGNOSIS — F41.9 ANXIETY: Chronic | ICD-10-CM

## 2023-04-19 DIAGNOSIS — G20.A1 PARKINSON'S DISEASE: Chronic | ICD-10-CM

## 2023-04-19 DIAGNOSIS — J44.9 CHRONIC OBSTRUCTIVE PULMONARY DISEASE, UNSPECIFIED COPD TYPE: Chronic | ICD-10-CM

## 2023-04-19 DIAGNOSIS — E53.8 VITAMIN B12 DEFICIENCY: Chronic | ICD-10-CM

## 2023-04-19 DIAGNOSIS — G70.00 MYASTHENIA GRAVIS WITHOUT (ACUTE) EXACERBATION: Chronic | ICD-10-CM

## 2023-04-19 DIAGNOSIS — F32.89 OTHER DEPRESSION: Chronic | ICD-10-CM

## 2023-04-19 DIAGNOSIS — M06.9 RHEUMATOID ARTHRITIS INVOLVING MULTIPLE SITES, UNSPECIFIED WHETHER RHEUMATOID FACTOR PRESENT: Chronic | ICD-10-CM

## 2023-04-19 DIAGNOSIS — F02.80 DEMENTIA IN OTHER DISEASES CLASSIFIED ELSEWHERE, UNSPECIFIED SEVERITY, WITHOUT BEHAVIORAL DISTURBANCE, PSYCHOTIC DISTURBANCE, MOOD DISTURBANCE, AND ANXIETY: Chronic | ICD-10-CM

## 2023-04-19 DIAGNOSIS — E44.0 MODERATE PROTEIN-CALORIE MALNUTRITION: Chronic | ICD-10-CM

## 2023-04-19 DIAGNOSIS — E87.6 HYPOKALEMIA: Chronic | ICD-10-CM

## 2023-04-19 PROBLEM — S72.009A HIP FRACTURE REQUIRING OPERATIVE REPAIR: Status: RESOLVED | Noted: 2022-08-23 | Resolved: 2023-04-19

## 2023-04-19 PROBLEM — F32.A DEPRESSION: Chronic | Status: ACTIVE | Noted: 2023-04-19

## 2023-04-19 PROBLEM — E87.5 HYPERKALEMIA: Status: RESOLVED | Noted: 2022-09-06 | Resolved: 2023-04-19

## 2023-04-19 PROCEDURE — 99214 OFFICE O/P EST MOD 30 MIN: CPT | Mod: ,,, | Performed by: FAMILY MEDICINE

## 2023-04-19 PROCEDURE — 99214 PR OFFICE/OUTPT VISIT, EST, LEVL IV, 30-39 MIN: ICD-10-PCS | Mod: ,,, | Performed by: FAMILY MEDICINE

## 2023-04-19 PROCEDURE — 96372 THER/PROPH/DIAG INJ SC/IM: CPT | Mod: ,,, | Performed by: FAMILY MEDICINE

## 2023-04-19 PROCEDURE — 96372 PR INJECTION,THERAP/PROPH/DIAG2ST, IM OR SUBCUT: ICD-10-PCS | Mod: ,,, | Performed by: FAMILY MEDICINE

## 2023-04-19 RX ORDER — MEGESTROL ACETATE 40 MG/ML
SUSPENSION ORAL
COMMUNITY
Start: 2022-11-18 | End: 2024-01-10 | Stop reason: SDUPTHER

## 2023-04-19 RX ORDER — LEVOTHYROXINE SODIUM 100 UG/1
100 TABLET ORAL
Qty: 60 TABLET | Refills: 1 | Status: SHIPPED | OUTPATIENT
Start: 2023-04-19 | End: 2023-08-07 | Stop reason: SDUPTHER

## 2023-04-19 RX ORDER — INDOMETHACIN 25 MG/1
CAPSULE ORAL
COMMUNITY
Start: 2023-02-07

## 2023-04-19 RX ORDER — CYANOCOBALAMIN 1000 UG/ML
1000 INJECTION, SOLUTION INTRAMUSCULAR; SUBCUTANEOUS ONCE
Status: COMPLETED | OUTPATIENT
Start: 2023-04-19 | End: 2023-04-19

## 2023-04-19 RX ORDER — QUETIAPINE FUMARATE 25 MG/1
25 TABLET, FILM COATED ORAL NIGHTLY
Qty: 90 TABLET | Refills: 1 | Status: SHIPPED | OUTPATIENT
Start: 2023-04-19 | End: 2023-06-28 | Stop reason: SDUPTHER

## 2023-04-19 RX ORDER — VENLAFAXINE HYDROCHLORIDE 150 MG/1
150 CAPSULE, EXTENDED RELEASE ORAL DAILY
Qty: 90 CAPSULE | Refills: 1 | Status: SHIPPED | OUTPATIENT
Start: 2023-04-19 | End: 2023-07-25 | Stop reason: SDUPTHER

## 2023-04-19 RX ORDER — POTASSIUM CHLORIDE 750 MG/1
20 CAPSULE, EXTENDED RELEASE ORAL 2 TIMES DAILY
Qty: 360 CAPSULE | Refills: 1 | Status: SHIPPED | OUTPATIENT
Start: 2023-04-19 | End: 2023-06-14 | Stop reason: SDUPTHER

## 2023-04-19 RX ORDER — ONDANSETRON 4 MG/1
TABLET, ORALLY DISINTEGRATING ORAL
COMMUNITY
Start: 2022-12-14

## 2023-04-19 RX ORDER — CYANOCOBALAMIN 1000 UG/ML
INJECTION, SOLUTION INTRAMUSCULAR; SUBCUTANEOUS
Qty: 6 ML | Refills: 2 | Status: SHIPPED | OUTPATIENT
Start: 2023-04-19

## 2023-04-19 RX ADMIN — CYANOCOBALAMIN 1000 MCG: 1000 INJECTION, SOLUTION INTRAMUSCULAR; SUBCUTANEOUS at 03:04

## 2023-04-19 NOTE — PROGRESS NOTES
Clinic Note    Patient Name: Rosa Alejo  : 1940  MRN: [id number]    Chief Complaint   Patient presents with    abnormal labs     F/u on abnormal labs done on Monday at [facility]. Caregiver reports her TSH was 34.4, Platelets and B12 were low    Tremors     Saw Dr. Pepper Monday for Parkinson       HPI:    Ms. Rosa Alejo is a 82 y.o. female who presents to clinic today with CC of follow up on abnormal labs.   Patient follows with Dr. Pepper (Neurology) for Parkinson's disease. She recently saw her for worsening tremor. Dr. Pepper's office called me to report lab abnormalities including significant elevation in TSH at 34.6. Patient does have a h/o hypothyroidism. They also reported a B12 level of less than 80. Nurse advised Dr. Pepper did increase sinemet for tremors and added megace as an appetite stimulant.   Patient is accompanied to this visit by her sitters.  Patient is, otherwise, without complaints.     Medications:  Medication List with Changes/Refills   New Medications    CYANOCOBALAMIN 1,000 MCG/ML INJECTION    Every 2 weeks X 3 months, then once monthly X 6 months.    LEVOTHYROXINE (SYNTHROID) 100 MCG TABLET    Take 1 tablet (100 mcg total) by mouth before breakfast.    VENLAFAXINE (EFFEXOR-XR) 150 MG CP24    Take 1 capsule (150 mg total) by mouth once daily.   Current Medications    ASPIRIN 325 MG TABLET    Take 325 mg by mouth once daily.    CALCIUM CARBONATE-VITAMIN D3 (CALTRATE 600 PLUS D) 600 MG (1,500 MG)-800 UNIT CHEW    Take by mouth Daily.    CARBIDOPA-LEVODOPA  MG (SINEMET)  MG PER TABLET    Take 2 tablets by mouth 4 (four) times daily.    CETIRIZINE (ZYRTEC) 10 MG TABLET    Take 1 tablet (10 mg total) by mouth once daily.    CHOLECALCIFEROL, VITAMIN D3, (VITAMIN D3) 25 MCG (1,000 UNIT) CAPSULE    Take 1,000 Units by mouth once daily.    DONEPEZIL (ARICEPT) 10 MG TABLET    Take 10 mg by mouth once daily.    IBUPROFEN (ADVIL,MOTRIN) 800 MG TABLET    Take 0.5 tablets (400  mg total) by mouth every 6 (six) hours as needed for Pain.    INDOMETHACIN (INDOCIN) 25 MG CAPSULE    Take by mouth.    LINACLOTIDE (LINZESS) 145 MCG CAP CAPSULE    Take 145 mcg by mouth Daily.    MEGESTROL (MEGACE) 400 MG/10 ML (40 MG/ML) SUSP    Take by mouth.    MEMANTINE (NAMENDA) 10 MG TAB    Take 5 mg by mouth 2 (two) times daily.    MONTELUKAST (SINGULAIR) 10 MG TABLET    Take by mouth.    NITROFURANTOIN, MACROCRYSTAL-MONOHYDRATE, (MACROBID) 100 MG CAPSULE    Take 1 capsule (100 mg total) by mouth 2 (two) times daily.    ONDANSETRON (ZOFRAN) 4 MG TABLET    Take 4 mg by mouth every 12 (twelve) hours.    ONDANSETRON (ZOFRAN-ODT) 4 MG TBDL    Take by mouth.    OXYCODONE-ACETAMINOPHEN (PERCOCET) 5-325 MG PER TABLET    Take 1 tablet by mouth every 4 (four) hours as needed for Pain.    PANTOPRAZOLE (PROTONIX) 40 MG TABLET    40 mg 2 (two) times daily.    PIMAVANSERIN (NUPLAZID) 34 MG CAP    Take 1 capsule by mouth once daily.    PYRIDOSTIGMINE (MESTINON) 60 MG TAB    30 mg 2 (two) times a day.     TRAMADOL (ULTRAM) 50 MG TABLET    as needed.   Changed and/or Refilled Medications    Modified Medication Previous Medication    POTASSIUM CHLORIDE (MICRO-K) 10 MEQ CPSR potassium chloride (MICRO-K) 10 MEQ CpSR       Take 2 capsules (20 mEq total) by mouth 2 (two) times daily. Take 2 tablets twice a day    Take 2 capsules (20 mEq total) by mouth 2 (two) times daily. Take 2 tablets twice a day    QUETIAPINE (SEROQUEL) 25 MG TAB QUEtiapine (SEROQUEL) 25 MG Tab       Take 1 tablet (25 mg total) by mouth nightly.    Take 12.5 mg by mouth nightly.   Discontinued Medications    LEVOTHYROXINE (SYNTHROID) 88 MCG TABLET    Take 44 mcg by mouth every Sat, Sun.    LEVOTHYROXINE (SYNTHROID) 88 MCG TABLET    Take 1 tablet (88 mcg total) by mouth before breakfast.    VENLAFAXINE (EFFEXOR-XR) 75 MG 24 HR CAPSULE    Take 75 mg by mouth once daily.         Allergies: Sulfa (sulfonamide antibiotics)      Past Medical History:    Past  Medical History:   Diagnosis Date    Dementia     Depression     Hypertension     Hypotension     Hypothyroid     OAB (overactive bladder)     chronic    Parkinson's disease        Past Surgical History:    Past Surgical History:   Procedure Laterality Date    CHOLECYSTECTOMY      ESOPHAGEAL DILATION      Interstim electrode test insertion with x-ray imaging   08/10/2020    at [facility];  test implant at S3 without difficulty;  2.0 mA was the testing and point;  no complications    KNEE SURGERY      LUMPECTOMY,BREAST, WITH RADIOACTIVE SEED LOCALIZATION AND SENTINEL LYMPH NODE BIOPSY      REMOVAL OF SACRAL NEUROSTIMULATOR DEVICE Left 11/22/2021    Procedure: REMOVAL, NEUROSTIMULATOR, SACRAL;  Surgeon: Reza Murphy MD;  Location: [facility] OR;  Service: Pain Management;  Laterality: Left;    SINUS SURGERY      TOTAL ABDOMINAL HYSTERECTOMY W/ BILATERAL SALPINGOOPHORECTOMY           Social History:    Social History     Tobacco Use   Smoking Status Never    Passive exposure: Past   Smokeless Tobacco Never     Social History     Substance and Sexual Activity   Alcohol Use Not Currently     Social History     Substance and Sexual Activity   Drug Use Never         Family History:    Family History   Problem Relation Age of Onset    Heart disease Father     Heart disease Mother        Review of Systems:    Review of Systems   Constitutional:  Positive for appetite change and fatigue. Negative for chills, fever and unexpected weight change.   Eyes:  Negative for visual disturbance.   Respiratory:  Negative for cough and shortness of breath.    Cardiovascular:  Negative for chest pain and leg swelling.        Reports chronic, intermittent issues with swelling in feet that resolves with elevation overnight   Gastrointestinal:  Negative for abdominal pain, change in bowel habit, constipation, diarrhea, nausea, vomiting and change in bowel habit.   Musculoskeletal:  Negative for arthralgias.  "  Integumentary:  Negative for rash.   Neurological:  Positive for weakness. Negative for dizziness and headaches.        + generalized weakness   Psychiatric/Behavioral:  The patient is not nervous/anxious.       Vitals:    Vitals:    04/19/23 1451   BP: 100/78   BP Location: Left arm   Patient Position: Sitting   Pulse: 98   Resp: 18   Temp: 97.9 °F (36.6 °C)   TempSrc: Oral   SpO2: 97%   Weight: Comment: unable to weigh   Height: 5' 7" (1.702 m)       Body mass index is 15.66 kg/m².    Wt Readings from Last 3 Encounters:   02/03/23 1050 45.4 kg (100 lb)   09/12/22 0600 45.5 kg (100 lb 3.2 oz)   09/05/22 0400 45 kg (99 lb 4.8 oz)   08/30/22 1528 46.7 kg (103 lb)   08/29/22 0045 47.1 kg (103 lb 12.8 oz)   08/23/22 1656 47.4 kg (104 lb 8 oz)   08/23/22 1500 47.4 kg (104 lb 8 oz)   08/20/22 1234 40.4 kg (89 lb)        Physical Exam:    Physical Exam  Constitutional:       General: She is not in acute distress.     Comments: Appears chronically ill but stable and in no acute distress   HENT:      Nose: Nose normal.      Mouth/Throat:      Mouth: Mucous membranes are moist.      Pharynx: Oropharynx is clear.   Eyes:      Conjunctiva/sclera: Conjunctivae normal.   Cardiovascular:      Rate and Rhythm: Normal rate and regular rhythm.      Heart sounds: Normal heart sounds. No murmur heard.  Pulmonary:      Effort: Pulmonary effort is normal. No respiratory distress.      Breath sounds: Normal breath sounds. No wheezing, rhonchi or rales.   Abdominal:      General: Bowel sounds are normal.      Palpations: Abdomen is soft.      Tenderness: There is no abdominal tenderness.   Musculoskeletal:      Cervical back: Neck supple.      Right lower leg: No edema.      Left lower leg: No edema.   Skin:     Findings: No rash.   Neurological:      Mental Status: She is alert. Mental status is at baseline.      Comments: In wheelchair   Psychiatric:         Mood and Affect: Mood normal.       Assessment/Plan:   1. Other specified " hypothyroidism  -     levothyroxine (SYNTHROID) 100 MCG tablet; Take 1 tablet (100 mcg total) by mouth before breakfast.  Dispense: 60 tablet; Refill: 1 - dose increase from 88 mcg. Patient reports medication compliance but TSH was 34 at recent neurology visit - referred here for management.   -     Ambulatory referral/consult to Home Health; Future; Expected date: 04/20/2023    2. Hypokalemia  -     potassium chloride (MICRO-K) 10 MEQ CpSR; Take 2 capsules (20 mEq total) by mouth 2 (two) times daily. Take 2 tablets twice a day  Dispense: 360 capsule; Refill: 1    3. Parkinson's disease  -     Ambulatory referral/consult to Home Health; Future; Expected date: 04/20/2023    4. Anxiety  -     venlafaxine (EFFEXOR-XR) 150 MG Cp24; Take 1 capsule (150 mg total) by mouth once daily.  Dispense: 90 capsule; Refill: 1  -     QUEtiapine (SEROQUEL) 25 MG Tab; Take 1 tablet (25 mg total) by mouth nightly.  Dispense: 90 tablet; Refill: 1    5. Other depression  -     venlafaxine (EFFEXOR-XR) 150 MG Cp24; Take 1 capsule (150 mg total) by mouth once daily.  Dispense: 90 capsule; Refill: 1  -     QUEtiapine (SEROQUEL) 25 MG Tab; Take 1 tablet (25 mg total) by mouth nightly.  Dispense: 90 tablet; Refill: 1    6. Vitamin B12 deficiency  -     cyanocobalamin injection 1,000 mcg  -     cyanocobalamin 1,000 mcg/mL injection; Every 2 weeks X 3 months, then once monthly X 6 months.  Dispense: 6 mL; Refill: 2  -     Ambulatory referral/consult to Home Health; Future; Expected date: 04/20/2023    7. Moderate protein-calorie malnutrition  - Appetite stimulant prescribed by Dr. Pepper - Continue as previously prescribed.     8. Primary adrenocortical insufficiency  The current medical regimen is effective;  continue present plan and medications.    9. Rheumatoid arthritis involving multiple sites, unspecified whether rheumatoid factor present  The current medical regimen is effective;  continue present plan and medications.    10. Chronic  obstructive pulmonary disease, unspecified COPD type  The current medical regimen is effective;  continue present plan and medications.    11. Dementia in other diseases classified elsewhere, unspecified severity, without behavioral disturbance, psychotic disturbance, mood disturbance, and anxiety  The current medical regimen is effective;  continue present plan and medications.    12. Myasthenia gravis without (acute) exacerbation  The current medical regimen is effective;  continue present plan and medications.       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    Parkinson's disease 04/19/2023    Chronic obstructive pulmonary disease, unspecified COPD type 04/19/2023    Dementia in other diseases classified elsewhere, unspecified severity, without behavioral disturbance, psychotic disturbance, mood disturbance, and anxiety 04/19/2023    Myasthenia gravis without (acute) exacerbation 04/19/2023    Anxiety 04/19/2023    Depression 04/19/2023    Vitamin B12 deficiency 04/19/2023    Incontinence 02/11/2022    Hypothyroidism 09/30/2021    OAB (overactive bladder) 04/20/2021    Moderate protein-calorie malnutrition 04/19/2023    Rheumatoid arthritis involving multiple sites, unspecified whether rheumatoid factor present 04/19/2023    Hypokalemia 04/19/2023        RTC in 6 weeks for follow up.  RTC sooner if symptoms worsen or fail to resolve.  Patient voiced understanding and is agreeable to plan.      Rosa Miller MD    Family Medicine

## 2023-05-09 DIAGNOSIS — Z71.89 COMPLEX CARE COORDINATION: ICD-10-CM

## 2023-05-11 ENCOUNTER — TELEPHONE (OUTPATIENT)
Dept: FAMILY MEDICINE | Facility: CLINIC | Age: 83
End: 2023-05-11
Payer: MEDICARE

## 2023-05-11 DIAGNOSIS — R30.0 DYSURIA: Primary | ICD-10-CM

## 2023-05-11 NOTE — TELEPHONE ENCOUNTER
----- Message from Jose Roberto Fernandez sent at 5/11/2023  8:59 AM CDT -----  Regarding: med refill  Pt say could you give her a call at [phone]

## 2023-05-12 ENCOUNTER — OFFICE VISIT (OUTPATIENT)
Dept: FAMILY MEDICINE | Facility: CLINIC | Age: 83
End: 2023-05-12
Payer: MEDICARE

## 2023-05-12 VITALS
OXYGEN SATURATION: 98 % | BODY MASS INDEX: 15.66 KG/M2 | TEMPERATURE: 98 F | RESPIRATION RATE: 16 BRPM | SYSTOLIC BLOOD PRESSURE: 90 MMHG | DIASTOLIC BLOOD PRESSURE: 70 MMHG | HEART RATE: 103 BPM | WEIGHT: 100 LBS

## 2023-05-12 DIAGNOSIS — R30.0 DYSURIA: ICD-10-CM

## 2023-05-12 DIAGNOSIS — R35.0 URINARY FREQUENCY: ICD-10-CM

## 2023-05-12 DIAGNOSIS — N39.0 URINARY TRACT INFECTION WITHOUT HEMATURIA, SITE UNSPECIFIED: Primary | ICD-10-CM

## 2023-05-12 LAB
BILIRUB SERPL-MCNC: NORMAL MG/DL
BLOOD URINE, POC: NORMAL
COLOR, POC UA: NORMAL
GLUCOSE UR QL STRIP: 100
KETONES UR QL STRIP: NORMAL
LEUKOCYTE ESTERASE URINE, POC: NORMAL
NITRITE, POC UA: NORMAL
PH, POC UA: 8.5
PROTEIN, POC: 100
SPECIFIC GRAVITY, POC UA: 1.01
UROBILINOGEN, POC UA: 0.2

## 2023-05-12 PROCEDURE — 87086 URINE CULTURE/COLONY COUNT: CPT | Mod: ,,, | Performed by: CLINICAL MEDICAL LABORATORY

## 2023-05-12 PROCEDURE — 87077 CULTURE, URINE: ICD-10-PCS | Mod: XU,,, | Performed by: CLINICAL MEDICAL LABORATORY

## 2023-05-12 PROCEDURE — 99213 PR OFFICE/OUTPT VISIT, EST, LEVL III, 20-29 MIN: ICD-10-PCS | Mod: ,,, | Performed by: NURSE PRACTITIONER

## 2023-05-12 PROCEDURE — 87186 SC STD MICRODIL/AGAR DIL: CPT | Mod: XU,,, | Performed by: CLINICAL MEDICAL LABORATORY

## 2023-05-12 PROCEDURE — 87086 CULTURE, URINE: ICD-10-PCS | Mod: ,,, | Performed by: CLINICAL MEDICAL LABORATORY

## 2023-05-12 PROCEDURE — 87077 CULTURE AEROBIC IDENTIFY: CPT | Mod: XU,,, | Performed by: CLINICAL MEDICAL LABORATORY

## 2023-05-12 PROCEDURE — 87186 CULTURE, URINE: ICD-10-PCS | Mod: XU,,, | Performed by: CLINICAL MEDICAL LABORATORY

## 2023-05-12 PROCEDURE — 99213 OFFICE O/P EST LOW 20 MIN: CPT | Mod: ,,, | Performed by: NURSE PRACTITIONER

## 2023-05-12 PROCEDURE — 81003 URINALYSIS AUTO W/O SCOPE: CPT | Mod: RHCUB | Performed by: NURSE PRACTITIONER

## 2023-05-12 RX ORDER — NITROFURANTOIN 25; 75 MG/1; MG/1
100 CAPSULE ORAL 2 TIMES DAILY
Qty: 14 CAPSULE | Refills: 0 | Status: SHIPPED | OUTPATIENT
Start: 2023-05-12

## 2023-05-12 NOTE — PROGRESS NOTES
Clinic Note    Rosa Alejo is a 82 y.o. female     Chief Complaint:   Chief Complaint   Patient presents with    abnormal urine odor     Also c/o burning on urination    restless     Sitter reports she has been restless and not sleeping and her balance has been off.         Subjective:    Patient comes in today with sitters. Sitter reports foul odor to urine. Patient reports dysuria. Patient has been more restless. Symptoms X 5 days. Sitter admits to diarrhea last week. Stools are improving but still loose.        Allergies:   Review of patient's allergies indicates:   Allergen Reactions    Sulfa (sulfonamide antibiotics) Rash        Past Medical History:  Past Medical History:   Diagnosis Date    Dementia     Depression     Hypertension     Hypotension     Hypothyroid     OAB (overactive bladder)     chronic    Parkinson's disease         Current Medications:    Current Outpatient Medications:     aspirin 325 MG tablet, Take 325 mg by mouth once daily., Disp: , Rfl:     calcium carbonate-vitamin D3 (CALTRATE 600 PLUS D) 600 mg (1,500 mg)-800 unit Chew, Take by mouth Daily., Disp: , Rfl:     carbidopa-levodopa  mg (SINEMET)  mg per tablet, Take 2 tablets by mouth 4 (four) times daily., Disp: , Rfl:     cetirizine (ZYRTEC) 10 MG tablet, Take 1 tablet (10 mg total) by mouth once daily., Disp: 90 tablet, Rfl: 1    cholecalciferol, vitamin D3, (VITAMIN D3) 25 mcg (1,000 unit) capsule, Take 1,000 Units by mouth once daily., Disp: , Rfl:     cyanocobalamin 1,000 mcg/mL injection, Every 2 weeks X 3 months, then once monthly X 6 months., Disp: 6 mL, Rfl: 2    donepeziL (ARICEPT) 10 MG tablet, Take 10 mg by mouth once daily., Disp: , Rfl:     indomethacin (INDOCIN) 25 MG capsule, Take by mouth., Disp: , Rfl:     levothyroxine (SYNTHROID) 100 MCG tablet, Take 1 tablet (100 mcg total) by mouth before breakfast., Disp: 60 tablet, Rfl: 1    linaCLOtide (LINZESS) 145 mcg Cap capsule, Take 145 mcg by mouth Daily.,  Disp: , Rfl:     megestroL (MEGACE) 400 mg/10 mL (40 mg/mL) Susp, Take by mouth., Disp: , Rfl:     memantine (NAMENDA) 10 MG Tab, Take 5 mg by mouth 2 (two) times daily., Disp: , Rfl:     montelukast (SINGULAIR) 10 mg tablet, Take by mouth., Disp: , Rfl:     oxyCODONE-acetaminophen (PERCOCET) 5-325 mg per tablet, Take 1 tablet by mouth every 4 (four) hours as needed for Pain., Disp: , Rfl:     pantoprazole (PROTONIX) 40 MG tablet, 40 mg 2 (two) times daily., Disp: , Rfl:     pimavanserin (NUPLAZID) 34 mg Cap, Take 1 capsule by mouth once daily., Disp: , Rfl:     potassium chloride (MICRO-K) 10 MEQ CpSR, Take 2 capsules (20 mEq total) by mouth 2 (two) times daily. Take 2 tablets twice a day, Disp: 360 capsule, Rfl: 1    pyridostigmine (MESTINON) 60 mg Tab, 30 mg 2 (two) times a day. , Disp: , Rfl:     QUEtiapine (SEROQUEL) 25 MG Tab, Take 1 tablet (25 mg total) by mouth nightly., Disp: 90 tablet, Rfl: 1    traMADoL (ULTRAM) 50 mg tablet, as needed., Disp: , Rfl:     venlafaxine (EFFEXOR-XR) 150 MG Cp24, Take 1 capsule (150 mg total) by mouth once daily., Disp: 90 capsule, Rfl: 1    nitrofurantoin, macrocrystal-monohydrate, (MACROBID) 100 MG capsule, Take 1 capsule (100 mg total) by mouth 2 (two) times daily., Disp: 14 capsule, Rfl: 0    ondansetron (ZOFRAN) 4 MG tablet, Take 4 mg by mouth every 12 (twelve) hours., Disp: , Rfl:     ondansetron (ZOFRAN-ODT) 4 MG TbDL, Take by mouth., Disp: , Rfl:        Review of Systems   Constitutional:  Negative for fever.   Respiratory:  Negative for cough and shortness of breath.    Cardiovascular:  Negative for chest pain.   Gastrointestinal:  Negative for abdominal pain.   Genitourinary:  Positive for dysuria.        Objective:    BP 90/70 (BP Location: Left arm, Patient Position: Sitting)   Pulse 103   Temp 97.7 °F (36.5 °C) (Oral)   Resp 16   Wt 45.4 kg (100 lb) Comment: estimated. pt refused  SpO2 98%   BMI 15.66 kg/m²      Physical Exam  Constitutional:        Appearance: She is cachectic.   Cardiovascular:      Rate and Rhythm: Normal rate and regular rhythm.      Pulses: Normal pulses.      Heart sounds: Normal heart sounds.   Pulmonary:      Effort: Pulmonary effort is normal.      Breath sounds: Normal breath sounds.   Abdominal:      Palpations: Abdomen is soft.      Tenderness: There is no abdominal tenderness.   Musculoskeletal:      Comments: Sitting up in wheelchair   Neurological:      Mental Status: She is alert.      Gait: Gait abnormal.        Assessment and Plan:    1. Urinary tract infection without hematuria, site unspecified    2. Urinary frequency    3. Dysuria         Urinary tract infection without hematuria, site unspecified  -     nitrofurantoin, macrocrystal-monohydrate, (MACROBID) 100 MG capsule; Take 1 capsule (100 mg total) by mouth 2 (two) times daily.  Dispense: 14 capsule; Refill: 0  -     Urinalysis; Future; Expected date: 05/12/2023  -     Urine Culture High Risk; Future; Expected date: 05/12/2023    Urinary frequency    Dysuria  -     POCT URINALYSIS W/O SCOPE    -encouraged probiotics daily      There are no Patient Instructions on file for this visit.   Follow up if symptoms worsen or fail to improve.

## 2023-05-16 LAB
UA COMPLETE W REFLEX CULTURE PNL UR: ABNORMAL
UA COMPLETE W REFLEX CULTURE PNL UR: ABNORMAL

## 2023-05-30 ENCOUNTER — LAB VISIT (OUTPATIENT)
Dept: LAB | Facility: CLINIC | Age: 83
End: 2023-05-30
Payer: MEDICARE

## 2023-05-30 DIAGNOSIS — R30.0 DYSURIA: Primary | ICD-10-CM

## 2023-05-30 DIAGNOSIS — R30.0 DYSURIA: ICD-10-CM

## 2023-05-30 LAB
BILIRUB SERPL-MCNC: NORMAL MG/DL
BLOOD URINE, POC: NEGATIVE
COLOR, POC UA: YELLOW
GLUCOSE UR QL STRIP: NEGATIVE
KETONES UR QL STRIP: 15
LEUKOCYTE ESTERASE URINE, POC: NORMAL
NITRITE, POC UA: NEGATIVE
PH, POC UA: 5.5
PROTEIN, POC: NEGATIVE
SPECIFIC GRAVITY, POC UA: 1.02
UROBILINOGEN, POC UA: 1

## 2023-05-30 PROCEDURE — 87186 CULTURE, URINE: ICD-10-PCS | Mod: ,,, | Performed by: CLINICAL MEDICAL LABORATORY

## 2023-05-30 PROCEDURE — 81003 URINALYSIS AUTO W/O SCOPE: CPT | Mod: RHCUB | Performed by: NURSE PRACTITIONER

## 2023-05-30 PROCEDURE — 87086 URINE CULTURE/COLONY COUNT: CPT | Mod: ,,, | Performed by: CLINICAL MEDICAL LABORATORY

## 2023-05-30 PROCEDURE — 87077 CULTURE AEROBIC IDENTIFY: CPT | Mod: ,,, | Performed by: CLINICAL MEDICAL LABORATORY

## 2023-05-30 PROCEDURE — 87186 SC STD MICRODIL/AGAR DIL: CPT | Mod: ,,, | Performed by: CLINICAL MEDICAL LABORATORY

## 2023-05-30 PROCEDURE — 87077 CULTURE, URINE: ICD-10-PCS | Mod: ,,, | Performed by: CLINICAL MEDICAL LABORATORY

## 2023-05-30 PROCEDURE — 87086 CULTURE, URINE: ICD-10-PCS | Mod: ,,, | Performed by: CLINICAL MEDICAL LABORATORY

## 2023-06-01 DIAGNOSIS — F03.90 DEMENTIA: ICD-10-CM

## 2023-06-01 DIAGNOSIS — R42 DIZZINESS: Primary | ICD-10-CM

## 2023-06-01 LAB — UA COMPLETE W REFLEX CULTURE PNL UR: ABNORMAL

## 2023-06-12 DIAGNOSIS — F03.90 DEMENTIA: Primary | ICD-10-CM

## 2023-06-14 DIAGNOSIS — E87.6 HYPOKALEMIA: Chronic | ICD-10-CM

## 2023-06-14 RX ORDER — POTASSIUM CHLORIDE 750 MG/1
20 CAPSULE, EXTENDED RELEASE ORAL 2 TIMES DAILY
Qty: 360 CAPSULE | Refills: 1 | Status: SHIPPED | OUTPATIENT
Start: 2023-06-14 | End: 2024-01-10 | Stop reason: SDUPTHER

## 2023-06-14 NOTE — TELEPHONE ENCOUNTER
----- Message from Roxana Miller MD sent at 6/14/2023 11:32 AM CDT -----  Regarding: FW: med refill  Please place refill in cart. I am not sure why the  is sending this to me. Thanks!  ----- Message -----  From: Valentín Carrasco  Sent: 6/14/2023   9:12 AM CDT  To: [facility] Staff  Subject: med refill                                       Pt need this med potassium chloride sent to [facility]

## 2023-06-28 ENCOUNTER — OFFICE VISIT (OUTPATIENT)
Dept: NEUROLOGY | Facility: CLINIC | Age: 83
End: 2023-06-28
Payer: MEDICARE

## 2023-06-28 VITALS
BODY MASS INDEX: 14.05 KG/M2 | HEIGHT: 67 IN | OXYGEN SATURATION: 98 % | RESPIRATION RATE: 18 BRPM | WEIGHT: 89.5 LBS | SYSTOLIC BLOOD PRESSURE: 101 MMHG | DIASTOLIC BLOOD PRESSURE: 64 MMHG | HEART RATE: 117 BPM

## 2023-06-28 DIAGNOSIS — G20.A1 PARKINSON'S DISEASE: Chronic | ICD-10-CM

## 2023-06-28 DIAGNOSIS — R42 DIZZINESS: ICD-10-CM

## 2023-06-28 DIAGNOSIS — F41.9 ANXIETY: Chronic | ICD-10-CM

## 2023-06-28 DIAGNOSIS — F32.89 OTHER DEPRESSION: Chronic | ICD-10-CM

## 2023-06-28 DIAGNOSIS — F02.80 DEMENTIA IN OTHER DISEASES CLASSIFIED ELSEWHERE, UNSPECIFIED SEVERITY, WITHOUT BEHAVIORAL DISTURBANCE, PSYCHOTIC DISTURBANCE, MOOD DISTURBANCE, AND ANXIETY: Primary | Chronic | ICD-10-CM

## 2023-06-28 PROCEDURE — 99204 PR OFFICE/OUTPT VISIT, NEW, LEVL IV, 45-59 MIN: ICD-10-PCS | Mod: S$PBB,,, | Performed by: PSYCHIATRY & NEUROLOGY

## 2023-06-28 PROCEDURE — 99204 OFFICE O/P NEW MOD 45 MIN: CPT | Mod: S$PBB,,, | Performed by: PSYCHIATRY & NEUROLOGY

## 2023-06-28 PROCEDURE — 99215 OFFICE O/P EST HI 40 MIN: CPT | Mod: PBBFAC | Performed by: PSYCHIATRY & NEUROLOGY

## 2023-06-28 RX ORDER — QUETIAPINE FUMARATE 25 MG/1
25 TABLET, FILM COATED ORAL NIGHTLY
Qty: 90 TABLET | Refills: 3 | Status: SHIPPED | OUTPATIENT
Start: 2023-06-28

## 2023-06-28 NOTE — PROGRESS NOTES
Subjective:       Patient ID: Rosa Alejo is a 83 y.o. female     Chief Complaint:    Chief Complaint   Patient presents with    Establish Care     Parkinson's, switching providers        Allergies:  Sulfa (sulfonamide antibiotics)    Current Medications:    Outpatient Encounter Medications as of 6/28/2023   Medication Sig Dispense Refill    aspirin 325 MG tablet Take 325 mg by mouth once daily.      calcium carbonate-vitamin D3 (CALTRATE 600 PLUS D) 600 mg (1,500 mg)-800 unit Chew Take by mouth Daily.      carbidopa-levodopa  mg (SINEMET)  mg per tablet Take 2 tablets by mouth 4 (four) times daily.      cetirizine (ZYRTEC) 10 MG tablet Take 1 tablet (10 mg total) by mouth once daily. 90 tablet 1    cholecalciferol, vitamin D3, (VITAMIN D3) 25 mcg (1,000 unit) capsule Take 1,000 Units by mouth once daily.      cyanocobalamin 1,000 mcg/mL injection Every 2 weeks X 3 months, then once monthly X 6 months. 6 mL 2    donepeziL (ARICEPT) 10 MG tablet Take 10 mg by mouth once daily.      indomethacin (INDOCIN) 25 MG capsule Take by mouth.      levothyroxine (SYNTHROID) 100 MCG tablet Take 1 tablet (100 mcg total) by mouth before breakfast. 60 tablet 1    linaCLOtide (LINZESS) 145 mcg Cap capsule Take 145 mcg by mouth Daily.      megestroL (MEGACE) 400 mg/10 mL (40 mg/mL) Susp Take by mouth.      memantine (NAMENDA) 10 MG Tab Take 5 mg by mouth 2 (two) times daily.      montelukast (SINGULAIR) 10 mg tablet Take by mouth.      nitrofurantoin, macrocrystal-monohydrate, (MACROBID) 100 MG capsule Take 1 capsule (100 mg total) by mouth 2 (two) times daily. 14 capsule 0    ondansetron (ZOFRAN) 4 MG tablet Take 4 mg by mouth every 12 (twelve) hours.      oxyCODONE-acetaminophen (PERCOCET) 5-325 mg per tablet Take 1 tablet by mouth every 4 (four) hours as needed for Pain.      pantoprazole (PROTONIX) 40 MG tablet 40 mg 2 (two) times daily.      pimavanserin (NUPLAZID) 34 mg Cap Take 1 capsule by mouth once  daily.      potassium chloride (MICRO-K) 10 MEQ CpSR Take 2 capsules (20 mEq total) by mouth 2 (two) times daily. Take 2 tablets twice a day 360 capsule 1    pyridostigmine (MESTINON) 60 mg Tab 30 mg 2 (two) times a day.       QUEtiapine (SEROQUEL) 25 MG Tab Take 1 tablet (25 mg total) by mouth nightly. 90 tablet 1    traMADoL (ULTRAM) 50 mg tablet as needed.      venlafaxine (EFFEXOR-XR) 150 MG Cp24 Take 1 capsule (150 mg total) by mouth once daily. 90 capsule 1    ondansetron (ZOFRAN-ODT) 4 MG TbDL Take by mouth.       No facility-administered encounter medications on file as of 6/28/2023.       History of Present Illness  82 yo WF in [place] brought in w/ daughter regarding poss dx of PARKINSON'S DISEASE and MYASTHENIA GRAVIS and dementia ?   Saw DR Pepper for last few years but no records available   Pt has been taking Sinemet and helping her symptoms somewhat?   Daughter reports being taken off Mestinon ? Then resumed? Then restarted ? Currently not taking?    D[facility] and some irritability - pt has not desiring to have PT/REhab       Past Medical History:   Diagnosis Date    Dementia     Depression     Hypertension     Hypotension     Hypothyroid     OAB (overactive bladder)     chronic    Parkinson's disease        Past Surgical History:   Procedure Laterality Date    CHOLECYSTECTOMY      ESOPHAGEAL DILATION      HIP SURGERY      Interstim electrode test insertion with x-ray imaging   08/10/2020    at [facility];  test implant at S3 without difficulty;  2.0 mA was the testing and point;  no complications    KNEE SURGERY      LUMPECTOMY,BREAST, WITH RADIOACTIVE SEED LOCALIZATION AND SENTINEL LYMPH NODE BIOPSY      REMOVAL OF SACRAL NEUROSTIMULATOR DEVICE Left 11/22/2021    Procedure: REMOVAL, NEUROSTIMULATOR, SACRAL;  Surgeon: Reza Murphy MD;  Location: [facility] OR;  Service: Pain Management;   "Laterality: Left;    SINUS SURGERY      TOTAL ABDOMINAL HYSTERECTOMY W/ BILATERAL SALPINGOOPHORECTOMY         Social History  Ms. Alejo  reports that she has never smoked. She has been exposed to tobacco smoke. She has never used smokeless tobacco. She reports that she does not currently use alcohol. She reports that she does not use drugs.    Family History  Ms.'s Alejo family history includes Heart disease in her father and mother.    Review of Systems  Review of Systems   Musculoskeletal:  Positive for back pain and joint pain.   Psychiatric/Behavioral:  Positive for depression, hallucinations and memory loss. The patient is nervous/anxious.    All other systems reviewed and are negative.   Objective:   /64 (BP Location: Left arm, Patient Position: Sitting, BP Method: Large (Manual))   Pulse (!) 117   Resp 18   Ht 5' 7" (1.702 m)   Wt 40.6 kg (89 lb 8 oz)   SpO2 98%   BMI 14.02 kg/m²    NEUROLOGICAL EXAMINATION:     MENTAL STATUS   Oriented to person.   Oriented to place. Disoriented to city.   Disoriented to year, month and date.   Registration: recalls 1 of 3 objects.        Did not know POTUS  Unable to do serial 7's   Signif dementia      CRANIAL NERVES   Cranial nerves II through XII intact.     MOTOR EXAM     Strength   Right neck flexion: 4/5  Left neck flexion: 4/5  Right neck extension: 4/5  Left neck extension: 4/5  Right deltoid: 4/5  Left deltoid: 4/5  Right biceps: 4/5  Left biceps: 4/5  Right triceps: 4/5  Left triceps: 4/5  Right wrist flexion: 4/5  Left wrist flexion: 4/5  Right wrist extension: 4/5  Left wrist extension: 4/5  Right interossei: 4/5  Left interossei: 4/5  Right abdominals: 4/5  Left abdominals: 4/5  Right iliopsoas: 4/5  Left iliopsoas: 4/5  Right quadriceps: 4/5  Left quadriceps: 4/5  Right hamstrin/5  Left hamstrin/5  Right glutei: 4/5  Left glutei: 4/5  Right anterior tibial: 4/5  Left anterior tibial: 4/5  Right posterior tibial: 4/5  Left posterior " tibial: 4/5  Right peroneal: 4/5  Left peroneal: 4/5  Right gastroc: 4/5  Left gastroc: 4/5       Physically decondtioned       GAIT AND COORDINATION        Needs wheelchair - very phys deconditioned       Physical Exam  Vitals reviewed.   Constitutional:       Appearance: She is normal weight.   Neurological:      Mental Status: She is alert. Mental status is at baseline.      Cranial Nerves: Cranial nerves 2-12 are intact.        Assessment:     Dementia in other diseases classified elsewhere, unspecified severity, without behavioral disturbance, psychotic disturbance, mood disturbance, and anxiety    Parkinson's disease         Primary Diagnosis and ICD10  Dementia in other diseases classified elsewhere, unspecified severity, without behavioral disturbance, psychotic disturbance, mood disturbance, and anxiety [F02.80]    Plan:     Patient Instructions   Cont Aricept and Namenda   Cont Sinemet at current dose and freq but do not take one hour before or after a meal  Needs daily PT/Rehab per caretaker   Maintain her nutriton w/ supplements  Caution w/ excess meds   Cont Seroquel but take whole tab around 5-6 pm  F/u 3-4 months             There are no discontinued medications.    Requested Prescriptions      No prescriptions requested or ordered in this encounter

## 2023-06-28 NOTE — PATIENT INSTRUCTIONS
Cont Aricept and Namenda   Cont Sinemet at current dose and freq but do not take one hour before or after a meal  Needs daily PT/Rehab per caretaker   Maintain her nutriton w/ supplements  Caution w/ excess meds   Cont Seroquel but take whole tab around 5-6 pm  F/u 3-4 months

## 2023-07-25 DIAGNOSIS — F41.9 ANXIETY: Chronic | ICD-10-CM

## 2023-07-25 DIAGNOSIS — F32.89 OTHER DEPRESSION: Chronic | ICD-10-CM

## 2023-07-25 RX ORDER — VENLAFAXINE HYDROCHLORIDE 150 MG/1
150 CAPSULE, EXTENDED RELEASE ORAL DAILY
Qty: 90 CAPSULE | Refills: 3 | Status: SHIPPED | OUTPATIENT
Start: 2023-07-25

## 2023-07-25 RX ORDER — MEMANTINE HYDROCHLORIDE 5 MG/1
5 TABLET ORAL 2 TIMES DAILY
Qty: 180 TABLET | Refills: 3 | Status: SHIPPED | OUTPATIENT
Start: 2023-07-25

## 2023-08-07 DIAGNOSIS — E03.8 OTHER SPECIFIED HYPOTHYROIDISM: Chronic | ICD-10-CM

## 2023-08-07 RX ORDER — LEVOTHYROXINE SODIUM 100 UG/1
100 TABLET ORAL
Qty: 90 TABLET | Refills: 1 | Status: SHIPPED | OUTPATIENT
Start: 2023-08-07 | End: 2024-01-10 | Stop reason: SDUPTHER

## 2023-08-24 RX ORDER — CARBIDOPA AND LEVODOPA 25; 100 MG/1; MG/1
2 TABLET ORAL 4 TIMES DAILY
Qty: 720 TABLET | Refills: 3 | Status: SHIPPED | OUTPATIENT
Start: 2023-08-24

## 2023-10-03 ENCOUNTER — OFFICE VISIT (OUTPATIENT)
Dept: NEUROLOGY | Facility: CLINIC | Age: 83
End: 2023-10-03
Payer: MEDICARE

## 2023-10-03 VITALS
HEART RATE: 90 BPM | WEIGHT: 89.88 LBS | OXYGEN SATURATION: 100 % | DIASTOLIC BLOOD PRESSURE: 93 MMHG | SYSTOLIC BLOOD PRESSURE: 124 MMHG | TEMPERATURE: 97 F | BODY MASS INDEX: 14.11 KG/M2 | RESPIRATION RATE: 18 BRPM | HEIGHT: 67 IN

## 2023-10-03 DIAGNOSIS — F02.80 DEMENTIA IN OTHER DISEASES CLASSIFIED ELSEWHERE, UNSPECIFIED SEVERITY, WITHOUT BEHAVIORAL DISTURBANCE, PSYCHOTIC DISTURBANCE, MOOD DISTURBANCE, AND ANXIETY: Primary | Chronic | ICD-10-CM

## 2023-10-03 PROCEDURE — 99214 OFFICE O/P EST MOD 30 MIN: CPT | Mod: S$PBB,,, | Performed by: PSYCHIATRY & NEUROLOGY

## 2023-10-03 PROCEDURE — 99214 PR OFFICE/OUTPT VISIT, EST, LEVL IV, 30-39 MIN: ICD-10-PCS | Mod: S$PBB,,, | Performed by: PSYCHIATRY & NEUROLOGY

## 2023-10-03 PROCEDURE — 99215 OFFICE O/P EST HI 40 MIN: CPT | Mod: PBBFAC | Performed by: PSYCHIATRY & NEUROLOGY

## 2023-10-03 RX ORDER — CETIRIZINE HYDROCHLORIDE 10 MG/1
10 TABLET ORAL DAILY
Qty: 90 TABLET | Refills: 1 | Status: SHIPPED | OUTPATIENT
Start: 2023-10-03

## 2023-10-03 NOTE — PATIENT INSTRUCTIONS
Cont current meds at dose and freq tolerated   Excellent support for daughter and caretaker  May incr phys activity as tolerated  F/u 6 motnhs

## 2023-10-03 NOTE — PROGRESS NOTES
Subjective:       Patient ID: Rosa Alejo is a 83 y.o. female     Chief Complaint:    Chief Complaint   Patient presents with    Follow-up     Dementia        Allergies:  Sulfa (sulfonamide antibiotics)    Current Medications:    Outpatient Encounter Medications as of 10/3/2023   Medication Sig Dispense Refill    aspirin 325 MG tablet Take 325 mg by mouth once daily.      calcium carbonate-vitamin D3 (CALTRATE 600 PLUS D) 600 mg (1,500 mg)-800 unit Chew Take by mouth Daily.      carbidopa-levodopa  mg (SINEMET)  mg per tablet Take 2 tablets by mouth 4 (four) times daily. 720 tablet 3    cetirizine (ZYRTEC) 10 MG tablet Take 1 tablet (10 mg total) by mouth once daily. 90 tablet 1    cholecalciferol, vitamin D3, (VITAMIN D3) 25 mcg (1,000 unit) capsule Take 1,000 Units by mouth once daily.      cyanocobalamin 1,000 mcg/mL injection Every 2 weeks X 3 months, then once monthly X 6 months. 6 mL 2    donepeziL (ARICEPT) 10 MG tablet Take 10 mg by mouth once daily.      indomethacin (INDOCIN) 25 MG capsule Take by mouth.      levothyroxine (SYNTHROID) 100 MCG tablet Take 1 tablet (100 mcg total) by mouth before breakfast. 90 tablet 1    linaCLOtide (LINZESS) 145 mcg Cap capsule Take 145 mcg by mouth Daily.      megestroL (MEGACE) 400 mg/10 mL (40 mg/mL) Susp Take by mouth.      memantine (NAMENDA) 5 MG Tab Take 1 tablet (5 mg total) by mouth 2 (two) times daily. 180 tablet 3    montelukast (SINGULAIR) 10 mg tablet Take by mouth.      nitrofurantoin, macrocrystal-monohydrate, (MACROBID) 100 MG capsule Take 1 capsule (100 mg total) by mouth 2 (two) times daily. 14 capsule 0    ondansetron (ZOFRAN) 4 MG tablet Take 4 mg by mouth every 12 (twelve) hours.      ondansetron (ZOFRAN-ODT) 4 MG TbDL Take by mouth.      pantoprazole (PROTONIX) 40 MG tablet 40 mg 2 (two) times daily.      pimavanserin (NUPLAZID) 34 mg Cap Take 1 capsule by mouth once daily.      potassium chloride (MICRO-K) 10 MEQ CpSR Take 2  capsules (20 mEq total) by mouth 2 (two) times daily. Take 2 tablets twice a day 360 capsule 1    QUEtiapine (SEROQUEL) 25 MG Tab Take 1 tablet (25 mg total) by mouth every evening. 90 tablet 3    traMADoL (ULTRAM) 50 mg tablet as needed.      venlafaxine (EFFEXOR-XR) 150 MG Cp24 Take 1 capsule (150 mg total) by mouth once daily. 90 capsule 3    [DISCONTINUED] oxyCODONE-acetaminophen (PERCOCET) 5-325 mg per tablet Take 1 tablet by mouth every 4 (four) hours as needed for Pain.      [DISCONTINUED] pyridostigmine (MESTINON) 60 mg Tab 30 mg 2 (two) times a day.       [DISCONTINUED] cetirizine (ZYRTEC) 10 MG tablet Take 1 tablet (10 mg total) by mouth once daily. 90 tablet 1     No facility-administered encounter medications on file as of 10/3/2023.       History of Present Illness  84 yo WF w/ dementia and prev dx PARKINSON'S DISEASE fairly well controlled on current Sinemet   Has been on Aricept  and Namenda and tolerating   Earlier dose of Seroquel has helped her anger and irritability   Pt not desiring any PT/Rehab but sleeps better when active during the day           Past Medical History:   Diagnosis Date    Dementia     Depression     Hypertension     Hypotension     Hypothyroid     OAB (overactive bladder)     chronic    Parkinson's disease        Past Surgical History:   Procedure Laterality Date    CHOLECYSTECTOMY      ESOPHAGEAL DILATION      HIP SURGERY      Interstim electrode test insertion with x-ray imaging   08/10/2020    at [facility];  test implant at S3 without difficulty;  2.0 mA was the testing and point;  no complications    KNEE SURGERY      LUMPECTOMY,BREAST, WITH RADIOACTIVE SEED LOCALIZATION AND SENTINEL LYMPH NODE BIOPSY      REMOVAL OF SACRAL NEUROSTIMULATOR DEVICE Left 11/22/2021    Procedure: REMOVAL, NEUROSTIMULATOR, SACRAL;  Surgeon: Reza Murphy MD;  Location: [facility] OR;  Service: Pain Management;  Laterality: Left;    SINUS SURGERY      TOTAL  "ABDOMINAL HYSTERECTOMY W/ BILATERAL SALPINGOOPHORECTOMY         Social History  Ms. Alejo  reports that she has never smoked. She has been exposed to tobacco smoke. She has never used smokeless tobacco. She reports that she does not currently use alcohol. She reports that she does not use drugs.    Family History  Ms.'s Alejo family history includes Heart disease in her father and mother.    Review of Systems  Review of Systems   Musculoskeletal:  Positive for joint pain.   Neurological:  Positive for focal weakness and weakness.   Psychiatric/Behavioral:  Positive for depression and memory loss. The patient is nervous/anxious.    All other systems reviewed and are negative.     Objective:   BP (!) 124/93 (BP Location: Left arm, Patient Position: Sitting, BP Method: Large (Manual))   Pulse 90   Temp 97.3 °F (36.3 °C) (Temporal)   Resp 18   Ht 5' 7" (1.702 m)   Wt 40.8 kg (89 lb 14.4 oz)   SpO2 100%   BMI 14.08 kg/m²    NEUROLOGICAL EXAMINATION:     MENTAL STATUS   Oriented to person.   Oriented to place. Oriented to country.   Disoriented to year. Oriented to month.   Registration: recalls 1 of 3 objects.   Speech: speech is normal   Level of consciousness: alert  Knowledge: good.        Dementia         Physical Exam  Psychiatric:         Speech: Speech normal.          Assessment:     Dementia in other diseases classified elsewhere, unspecified severity, without behavioral disturbance, psychotic disturbance, mood disturbance, and anxiety         Primary Diagnosis and ICD10  Dementia in other diseases classified elsewhere, unspecified severity, without behavioral disturbance, psychotic disturbance, mood disturbance, and anxiety [F02.80]    Plan:     Patient Instructions   Cont current meds at dose and freq tolerated   Excellent support for daughter and caretaker  May incr phys activity as tolerated  F/u 6 motnhs      Medications Discontinued During This Encounter   Medication Reason    pyridostigmine " (MESTINON) 60 mg Tab     oxyCODONE-acetaminophen (PERCOCET) 5-325 mg per tablet        Requested Prescriptions      No prescriptions requested or ordered in this encounter

## 2023-12-09 DIAGNOSIS — Z71.89 COMPLEX CARE COORDINATION: ICD-10-CM

## 2024-01-10 ENCOUNTER — OFFICE VISIT (OUTPATIENT)
Dept: FAMILY MEDICINE | Facility: CLINIC | Age: 84
End: 2024-01-10
Payer: MEDICARE

## 2024-01-10 VITALS
TEMPERATURE: 98 F | HEART RATE: 108 BPM | BODY MASS INDEX: 14.08 KG/M2 | OXYGEN SATURATION: 97 % | SYSTOLIC BLOOD PRESSURE: 105 MMHG | RESPIRATION RATE: 15 BRPM | DIASTOLIC BLOOD PRESSURE: 68 MMHG | HEIGHT: 67 IN

## 2024-01-10 DIAGNOSIS — E53.8 VITAMIN B12 DEFICIENCY: Chronic | ICD-10-CM

## 2024-01-10 DIAGNOSIS — F02.80 DEMENTIA IN OTHER DISEASES CLASSIFIED ELSEWHERE, UNSPECIFIED SEVERITY, WITHOUT BEHAVIORAL DISTURBANCE, PSYCHOTIC DISTURBANCE, MOOD DISTURBANCE, AND ANXIETY: ICD-10-CM

## 2024-01-10 DIAGNOSIS — R53.83 OTHER FATIGUE: ICD-10-CM

## 2024-01-10 DIAGNOSIS — J44.9 CHRONIC OBSTRUCTIVE PULMONARY DISEASE, UNSPECIFIED COPD TYPE: ICD-10-CM

## 2024-01-10 DIAGNOSIS — E44.0 MODERATE PROTEIN-CALORIE MALNUTRITION: ICD-10-CM

## 2024-01-10 DIAGNOSIS — F41.9 ANXIETY: Chronic | ICD-10-CM

## 2024-01-10 DIAGNOSIS — Z79.899 OTHER LONG TERM (CURRENT) DRUG THERAPY: ICD-10-CM

## 2024-01-10 DIAGNOSIS — G20.A2 PARKINSON'S DISEASE WITHOUT DYSKINESIA, WITH FLUCTUATING MANIFESTATIONS: ICD-10-CM

## 2024-01-10 DIAGNOSIS — E03.8 OTHER SPECIFIED HYPOTHYROIDISM: Primary | ICD-10-CM

## 2024-01-10 DIAGNOSIS — R63.0 DECREASED APPETITE: ICD-10-CM

## 2024-01-10 DIAGNOSIS — E87.6 HYPOKALEMIA: Chronic | ICD-10-CM

## 2024-01-10 DIAGNOSIS — F32.89 OTHER DEPRESSION: Chronic | ICD-10-CM

## 2024-01-10 DIAGNOSIS — G70.00 MYASTHENIA GRAVIS WITHOUT (ACUTE) EXACERBATION: ICD-10-CM

## 2024-01-10 DIAGNOSIS — E27.1 PRIMARY ADRENOCORTICAL INSUFFICIENCY: ICD-10-CM

## 2024-01-10 DIAGNOSIS — M06.9 RHEUMATOID ARTHRITIS INVOLVING MULTIPLE SITES, UNSPECIFIED WHETHER RHEUMATOID FACTOR PRESENT: ICD-10-CM

## 2024-01-10 LAB
25(OH)D3 SERPL-MCNC: 54.8 NG/ML
ALBUMIN SERPL BCP-MCNC: 4.3 G/DL (ref 3.5–5)
ALBUMIN/GLOB SERPL: 1 {RATIO}
ALP SERPL-CCNC: 83 U/L (ref 55–142)
ALT SERPL W P-5'-P-CCNC: 17 U/L (ref 13–56)
ANION GAP SERPL CALCULATED.3IONS-SCNC: 11 MMOL/L (ref 7–16)
AST SERPL W P-5'-P-CCNC: 15 U/L (ref 15–37)
BILIRUB SERPL-MCNC: 0.4 MG/DL (ref ?–1.2)
BUN SERPL-MCNC: 34 MG/DL (ref 7–18)
BUN/CREAT SERPL: 33 (ref 6–20)
CALCIUM SERPL-MCNC: 9.6 MG/DL (ref 8.5–10.1)
CHLORIDE SERPL-SCNC: 109 MMOL/L (ref 98–107)
CHOLEST SERPL-MCNC: 203 MG/DL (ref 0–200)
CHOLEST/HDLC SERPL: 2.7 {RATIO}
CO2 SERPL-SCNC: 23 MMOL/L (ref 21–32)
CREAT SERPL-MCNC: 1.03 MG/DL (ref 0.55–1.02)
EGFR (NO RACE VARIABLE) (RUSH/TITUS): 54 ML/MIN/1.73M2
GLOBULIN SER-MCNC: 4.2 G/DL (ref 2–4)
GLUCOSE SERPL-MCNC: 96 MG/DL (ref 74–106)
HDLC SERPL-MCNC: 76 MG/DL (ref 40–60)
LDLC SERPL CALC-MCNC: 107 MG/DL
LDLC/HDLC SERPL: 1.4 {RATIO}
NONHDLC SERPL-MCNC: 127 MG/DL
POTASSIUM SERPL-SCNC: 4.3 MMOL/L (ref 3.5–5.1)
PROT SERPL-MCNC: 8.5 G/DL (ref 6.4–8.2)
SODIUM SERPL-SCNC: 139 MMOL/L (ref 136–145)
TRIGL SERPL-MCNC: 100 MG/DL (ref 35–150)
TSH SERPL DL<=0.005 MIU/L-ACNC: 0.66 UIU/ML (ref 0.36–3.74)
VIT B12 SERPL-MCNC: 159 PG/ML (ref 193–986)
VLDLC SERPL-MCNC: 20 MG/DL

## 2024-01-10 PROCEDURE — 82607 VITAMIN B-12: CPT | Mod: ,,, | Performed by: CLINICAL MEDICAL LABORATORY

## 2024-01-10 PROCEDURE — 99214 OFFICE O/P EST MOD 30 MIN: CPT | Mod: ,,, | Performed by: FAMILY MEDICINE

## 2024-01-10 PROCEDURE — 80061 LIPID PANEL: CPT | Mod: ,,, | Performed by: CLINICAL MEDICAL LABORATORY

## 2024-01-10 PROCEDURE — 82306 VITAMIN D 25 HYDROXY: CPT | Mod: ,,, | Performed by: CLINICAL MEDICAL LABORATORY

## 2024-01-10 PROCEDURE — 84443 ASSAY THYROID STIM HORMONE: CPT | Mod: ,,, | Performed by: CLINICAL MEDICAL LABORATORY

## 2024-01-10 PROCEDURE — 80053 COMPREHEN METABOLIC PANEL: CPT | Mod: ,,, | Performed by: CLINICAL MEDICAL LABORATORY

## 2024-01-10 PROCEDURE — 85025 COMPLETE CBC W/AUTO DIFF WBC: CPT | Mod: ,,, | Performed by: CLINICAL MEDICAL LABORATORY

## 2024-01-10 RX ORDER — LEVOTHYROXINE SODIUM 100 UG/1
100 TABLET ORAL
Qty: 90 TABLET | Refills: 1 | Status: SHIPPED | OUTPATIENT
Start: 2024-01-10 | End: 2024-05-15 | Stop reason: SDUPTHER

## 2024-01-10 RX ORDER — MEGESTROL ACETATE 40 MG/ML
200 SUSPENSION ORAL
Qty: 480 ML | Refills: 6 | Status: SHIPPED | OUTPATIENT
Start: 2024-01-10

## 2024-01-10 RX ORDER — POTASSIUM CHLORIDE 750 MG/1
20 CAPSULE, EXTENDED RELEASE ORAL 2 TIMES DAILY
Qty: 360 CAPSULE | Refills: 1 | Status: SHIPPED | OUTPATIENT
Start: 2024-01-10

## 2024-01-10 NOTE — PROGRESS NOTES
"Clinic Note    Patient Name: Rosa Alejo  : 1940  MRN: [id number]    Chief Complaint   Patient presents with    Follow-up    Health Maintenance     TETANUS VACCINE Never done  DEXA Scan Never done  Shingles Vaccine(1 of 2) Never done  RSV Vaccine (Age 60+ and Pregnant patients)(1 - 1-dose 60+ series) Never done  Pneumococcal Vaccines (Age 65+)(3 - PPSV23 or PCV20) due on 10/15/2020  Lipid Panel due on 2023  Influenza Vaccine(1) due on 2023  COVID-19 Vaccine( -  season) due on 2023        HPI:    Ms. Rosa Alejo is a 83 y.o. female who presents to clinic today with CC of follow up on chronic disease processes including Parkinson's Disease, dementia, myasthenia gravis, anxiety/depression, COPD, OAB, RA, adrenocortical insufficiency, hypothyroidism, and vitamin B12 deficiency.   Patient is accompanied to this visit by 2 sitters/caregivers. They provide most of the history.  Appetite "comes and goes" but sitter reports she eats well as long as she takes the megace.   Reports she naps a lot during the day and does not sleep well at night.   Patient reports chronic issues are well controlled on current medication regimen.  Denies problems or side effects with medications.  Patient is, otherwise, without complaints.     Medications:  Medication List with Changes/Refills   Current Medications    ASPIRIN 325 MG TABLET    Take 325 mg by mouth once daily.    CALCIUM CARBONATE-VITAMIN D3 (CALTRATE 600 PLUS D) 600 MG (1,500 MG)-800 UNIT CHEW    Take by mouth Daily.    CARBIDOPA-LEVODOPA  MG (SINEMET)  MG PER TABLET    Take 2 tablets by mouth 4 (four) times daily.    CETIRIZINE (ZYRTEC) 10 MG TABLET    Take 1 tablet (10 mg total) by mouth once daily.    CHOLECALCIFEROL, VITAMIN D3, (VITAMIN D3) 25 MCG (1,000 UNIT) CAPSULE    Take 1,000 Units by mouth once daily.    CYANOCOBALAMIN 1,000 MCG/ML INJECTION    Every 2 weeks X 3 months, then once monthly X 6 months.    DONEPEZIL " (ARICEPT) 10 MG TABLET    Take 10 mg by mouth once daily.    INDOMETHACIN (INDOCIN) 25 MG CAPSULE    Take by mouth.    LINACLOTIDE (LINZESS) 145 MCG CAP CAPSULE    Take 145 mcg by mouth Daily.    MEMANTINE (NAMENDA) 5 MG TAB    Take 1 tablet (5 mg total) by mouth 2 (two) times daily.    MONTELUKAST (SINGULAIR) 10 MG TABLET    Take by mouth.    NITROFURANTOIN, MACROCRYSTAL-MONOHYDRATE, (MACROBID) 100 MG CAPSULE    Take 1 capsule (100 mg total) by mouth 2 (two) times daily.    ONDANSETRON (ZOFRAN) 4 MG TABLET    Take 4 mg by mouth every 12 (twelve) hours.    ONDANSETRON (ZOFRAN-ODT) 4 MG TBDL    Take by mouth.    PANTOPRAZOLE (PROTONIX) 40 MG TABLET    40 mg 2 (two) times daily.    PIMAVANSERIN (NUPLAZID) 34 MG CAP    Take 1 capsule by mouth once daily.    QUETIAPINE (SEROQUEL) 25 MG TAB    Take 1 tablet (25 mg total) by mouth every evening.    TRAMADOL (ULTRAM) 50 MG TABLET    as needed.    VENLAFAXINE (EFFEXOR-XR) 150 MG CP24    Take 1 capsule (150 mg total) by mouth once daily.   Changed and/or Refilled Medications    Modified Medication Previous Medication    LEVOTHYROXINE (SYNTHROID) 100 MCG TABLET levothyroxine (SYNTHROID) 100 MCG tablet       Take 1 tablet (100 mcg total) by mouth before breakfast.    Take 1 tablet (100 mcg total) by mouth before breakfast.    MEGESTROL (MEGACE) 400 MG/10 ML (40 MG/ML) SUSP megestroL (MEGACE) 400 mg/10 mL (40 mg/mL) Susp       Take 5 mLs (200 mg total) by mouth 3 (three) times daily with meals.    Take by mouth.    POTASSIUM CHLORIDE (MICRO-K) 10 MEQ CPSR potassium chloride (MICRO-K) 10 MEQ CpSR       Take 2 capsules (20 mEq total) by mouth 2 (two) times daily. Take 2 tablets twice a day    Take 2 capsules (20 mEq total) by mouth 2 (two) times daily. Take 2 tablets twice a day        Allergies: Sulfa (sulfonamide antibiotics)      Past Medical History:    Past Medical History:   Diagnosis Date    Dementia     Depression     Hypertension     Hypotension     Hypothyroid     OAB  (overactive bladder)     chronic    Parkinson's disease        Past Surgical History:    Past Surgical History:   Procedure Laterality Date    CHOLECYSTECTOMY      ESOPHAGEAL DILATION      HIP SURGERY      Interstim electrode test insertion with x-ray imaging   08/10/2020    at [facility];  test implant at S3 without difficulty;  2.0 mA was the testing and point;  no complications    KNEE SURGERY      LUMPECTOMY,BREAST, WITH RADIOACTIVE SEED LOCALIZATION AND SENTINEL LYMPH NODE BIOPSY      REMOVAL OF SACRAL NEUROSTIMULATOR DEVICE Left 11/22/2021    Procedure: REMOVAL, NEUROSTIMULATOR, SACRAL;  Surgeon: Reza Murphy MD;  Location: [facility] OR;  Service: Pain Management;  Laterality: Left;    SINUS SURGERY      TOTAL ABDOMINAL HYSTERECTOMY W/ BILATERAL SALPINGOOPHORECTOMY           Social History:    Social History     Tobacco Use   Smoking Status Never    Passive exposure: Past   Smokeless Tobacco Never     Social History     Substance and Sexual Activity   Alcohol Use Not Currently     Social History     Substance and Sexual Activity   Drug Use Never         Family History:    Family History   Problem Relation Age of Onset    Heart disease Father     Heart disease Mother        Review of Systems:    Review of Systems   Constitutional:  Negative for appetite change, chills, fatigue, fever and unexpected weight change.   Eyes:  Negative for visual disturbance.   Respiratory:  Negative for cough and shortness of breath.    Cardiovascular:  Negative for chest pain and leg swelling.   Gastrointestinal:  Negative for abdominal pain, change in bowel habit, constipation, diarrhea, nausea and vomiting.   Musculoskeletal:  Negative for arthralgias.   Integumentary:  Negative for rash.   Neurological:  Negative for dizziness and headaches.   Psychiatric/Behavioral:  The patient is not nervous/anxious.         Vitals:    Vitals:    01/10/24 1103   BP: 105/68   BP Location: Left arm   Patient  "Position: Sitting   BP Method: Small (Automatic)   Pulse: 108   Resp: 15   Temp: 98.2 °F (36.8 °C)   TempSrc: Oral   SpO2: 97%   Weight: Comment: pt unable to stand   Height: 5' 7" (1.702 m)       Body mass index is 14.08 kg/m².    Wt Readings from Last 3 Encounters:   10/03/23 0941 40.8 kg (89 lb 14.4 oz)   06/28/23 0839 40.6 kg (89 lb 8 oz)   05/12/23 0830 45.4 kg (100 lb)        Physical Exam:    Physical Exam  Constitutional:       General: She is not in acute distress.     Comments: Appears chronically ill but stable and in no acute distress.   HENT:      Nose: Nose normal.      Mouth/Throat:      Mouth: Mucous membranes are moist.      Pharynx: Oropharynx is clear.   Eyes:      Conjunctiva/sclera: Conjunctivae normal.   Cardiovascular:      Rate and Rhythm: Normal rate and regular rhythm.      Heart sounds: Normal heart sounds. No murmur heard.  Pulmonary:      Effort: Pulmonary effort is normal. No respiratory distress.      Breath sounds: Normal breath sounds. No wheezing, rhonchi or rales.   Abdominal:      General: Bowel sounds are normal.      Palpations: Abdomen is soft.      Tenderness: There is no abdominal tenderness.   Musculoskeletal:      Cervical back: Neck supple.      Right lower leg: No edema.      Left lower leg: No edema.   Skin:     Findings: No rash.   Neurological:      General: No focal deficit present.      Mental Status: She is alert. Mental status is at baseline.      Comments: Sitting in wheelchair  Confusion at baseline - h/o dementia   Psychiatric:         Mood and Affect: Mood normal.           Assessment/Plan:   1. Other specified hypothyroidism  -     levothyroxine (SYNTHROID) 100 MCG tablet; Take 1 tablet (100 mcg total) by mouth before breakfast.  Dispense: 90 tablet; Refill: 1  -     CBC Auto Differential; Future; Expected date: 01/10/2024  -     Comprehensive Metabolic Panel; Future; Expected date: 01/10/2024  -     Lipid Panel; Future; Expected date: 01/10/2024  -     TSH; " Future; Expected date: 01/10/2024    2. Hypokalemia  -     potassium chloride (MICRO-K) 10 MEQ CpSR; Take 2 capsules (20 mEq total) by mouth 2 (two) times daily. Take 2 tablets twice a day  Dispense: 360 capsule; Refill: 1  -     Comprehensive Metabolic Panel; Future; Expected date: 01/10/2024    3. Chronic obstructive pulmonary disease, unspecified COPD type  The current medical regimen is effective;  continue present plan and medications.  - Denies any recent exacerbations    4. Primary adrenocortical insufficiency  The current medical regimen is effective;  continue present plan and medications.    5. Rheumatoid arthritis involving multiple sites, unspecified whether rheumatoid factor present  The current medical regimen is effective;  continue present plan and medications.  Stable with no acute flares with pain recently    6. Dementia in other diseases classified elsewhere, unspecified severity, without behavioral disturbance, psychotic disturbance, mood disturbance, and anxiety  The current medical regimen is effective;  continue present plan and medications.  Stable - continue current medications and plan.    7. Myasthenia gravis without (acute) exacerbation  The current medical regimen is effective;  continue present plan and medications.  -stable    8. Parkinson's disease without dyskinesia, with fluctuating manifestations  The current medical regimen is effective;  continue present plan and medications.  Stable    9. Moderate protein-calorie malnutrition  - Encouraged use of boost/ensure between meals    10. Decreased appetite  -     megestroL (MEGACE) 400 mg/10 mL (40 mg/mL) Susp; Take 5 mLs (200 mg total) by mouth 3 (three) times daily with meals.  Dispense: 480 mL; Refill: 6    11. Anxiety  The current medical regimen is effective;  continue present plan and medications.    12. Other depression  The current medical regimen is effective;  continue present plan and medications.    13. Vitamin B12  deficiency  -     Vitamin B12; Future; Expected date: 01/10/2024    14. Other fatigue  -     Vitamin D; Future; Expected date: 01/10/2024    15. Other long term (current) drug therapy  -     Vitamin D; Future; Expected date: 01/10/2024         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    Parkinson's disease 04/19/2023    Chronic obstructive pulmonary disease, unspecified COPD type 04/19/2023    Dementia in other diseases classified elsewhere, unspecified severity, without behavioral disturbance, psychotic disturbance, mood disturbance, and anxiety 04/19/2023    Myasthenia gravis without (acute) exacerbation 04/19/2023    Anxiety 04/19/2023    Depression 04/19/2023    Vitamin B12 deficiency 04/19/2023    Incontinence 02/11/2022    Hypothyroidism 09/30/2021    OAB (overactive bladder) 04/20/2021    Moderate protein-calorie malnutrition 04/19/2023    Rheumatoid arthritis involving multiple sites, unspecified whether rheumatoid factor present 04/19/2023    Hypokalemia 04/19/2023        Health Maintenance:  Health Maintenance   Topic Date Due    TETANUS VACCINE  Never done    DEXA Scan  Never done    Shingles Vaccine (1 of 2) Never done    Lipid Panel  01/10/2025       RTC in 6 months for chronic follow up.  RTC sooner if needed.   Patient voiced understanding and is agreeable to plan.      Rosa Miller MD    Family Medicine

## 2024-01-11 ENCOUNTER — TELEPHONE (OUTPATIENT)
Dept: FAMILY MEDICINE | Facility: CLINIC | Age: 84
End: 2024-01-11
Payer: MEDICARE

## 2024-01-11 LAB
BASOPHILS # BLD AUTO: 0.02 K/UL (ref 0–0.2)
BASOPHILS NFR BLD AUTO: 0.2 % (ref 0–1)
DIFFERENTIAL METHOD BLD: ABNORMAL
EOSINOPHIL # BLD AUTO: 0.1 K/UL (ref 0–0.5)
EOSINOPHIL NFR BLD AUTO: 1.2 % (ref 1–4)
ERYTHROCYTE [DISTWIDTH] IN BLOOD BY AUTOMATED COUNT: 12.8 % (ref 11.5–14.5)
HCT VFR BLD AUTO: 41 % (ref 38–47)
HGB BLD-MCNC: 13.6 G/DL (ref 12–16)
IMM GRANULOCYTES # BLD AUTO: 0.03 K/UL (ref 0–0.04)
IMM GRANULOCYTES NFR BLD: 0.3 % (ref 0–0.4)
LYMPHOCYTES # BLD AUTO: 1.69 K/UL (ref 1–4.8)
LYMPHOCYTES NFR BLD AUTO: 19.7 % (ref 27–41)
MCH RBC QN AUTO: 33.3 PG (ref 27–31)
MCHC RBC AUTO-ENTMCNC: 33.2 G/DL (ref 32–36)
MCV RBC AUTO: 100.2 FL (ref 80–96)
MONOCYTES # BLD AUTO: 0.51 K/UL (ref 0–0.8)
MONOCYTES NFR BLD AUTO: 5.9 % (ref 2–6)
MPC BLD CALC-MCNC: 10.3 FL (ref 9.4–12.4)
NEUTROPHILS # BLD AUTO: 6.23 K/UL (ref 1.8–7.7)
NEUTROPHILS NFR BLD AUTO: 72.7 % (ref 53–65)
NRBC # BLD AUTO: 0 X10E3/UL
NRBC, AUTO (.00): 0 %
PLATELET # BLD AUTO: 379 K/UL (ref 150–400)
RBC # BLD AUTO: 4.09 M/UL (ref 4.2–5.4)
WBC # BLD AUTO: 8.58 K/UL (ref 4.5–11)

## 2024-01-11 NOTE — TELEPHONE ENCOUNTER
----- Message from Roxana Miller MD sent at 2024  8:04 AM CST -----  Please call patient's family/caregiver to let them know that her labs are fairly stable. Her anemia is improved from last year. H&H is currently in normal range. Creatinine is very mildly elevated. She needs to make sure she is drinking plenty of fluids.   Vitamin B12 is low. Patient would likely benefit from Vitamin B12 injections once monthly X 6 months. Patient can come in for a nurse only visit monthly or we can send medication to pharmacy and patient can give injections at home.   Labs, otherwise, ok/stable. Thanks!      [phone]- call made to pt. Pt caregiver answered phone and verified pt . Voiced understanding and states they will give her her b12 injections at home and already has some at home. States she will call back if refill is needed.

## 2024-02-02 NOTE — ED PROVIDER NOTES
Encounter Date: 8/20/2022       History     Chief Complaint   Patient presents with    Fall    Hip Pain     Left hip after fall, 0605 this am    Head Injury     Bruising to face outside of left eye     81 y/o WF presents to the ED via EMS after falling at 6 am this morning at her home. Pt has a history of Dementia and gets up and wonders at home. Pt refused to call an ambulance this morning but eventually allowed her sitter to call an ambulance. PT reports left hip pain. She is unsure if she lost consciousness but did report hitting her head. Sitter is present.         Review of patient's allergies indicates:   Allergen Reactions    Sulfa (sulfonamide antibiotics) Rash     Past Medical History:   Diagnosis Date    Dementia     Depression     Hypertension     Hypotension     Hypothyroid     OAB (overactive bladder)     chronic    Parkinson's disease      Past Surgical History:   Procedure Laterality Date    CHOLECYSTECTOMY      ESOPHAGEAL DILATION      Interstim electrode test insertion with x-ray imaging   08/10/2020    at [facility];  test implant at S3 without difficulty;  2.0 mA was the testing and point;  no complications    KNEE SURGERY      LUMPECTOMY,BREAST, WITH RADIOACTIVE SEED LOCALIZATION AND SENTINEL LYMPH NODE BIOPSY      REMOVAL OF SACRAL NEUROSTIMULATOR DEVICE Left 11/22/2021    Procedure: REMOVAL, NEUROSTIMULATOR, SACRAL;  Surgeon: Reza Murphy MD;  Location: [facility] OR;  Service: Pain Management;  Laterality: Left;    SINUS SURGERY      TOTAL ABDOMINAL HYSTERECTOMY W/ BILATERAL SALPINGOOPHORECTOMY       Family History   Problem Relation Age of Onset    Heart disease Father     Heart disease Mother      Social History     Tobacco Use    Smoking status: Never Smoker    Smokeless tobacco: Never Used   Substance Use Topics    Alcohol use: Not Currently    Drug use: Never     Review of Systems   Constitutional: Negative.  Negative for fever.    HENT: Negative.  Negative for sore throat.    Eyes: Negative.    Respiratory: Negative.  Negative for shortness of breath.    Cardiovascular: Negative.  Negative for chest pain, palpitations and leg swelling.   Gastrointestinal: Negative.  Negative for diarrhea, nausea and vomiting.   Endocrine: Negative.    Genitourinary: Negative.  Negative for dysuria.   Musculoskeletal: Positive for arthralgias, gait problem and myalgias. Negative for back pain and neck pain.   Skin: Negative.  Negative for rash.   Neurological: Negative for weakness.   Hematological: Does not bruise/bleed easily.   Psychiatric/Behavioral: Negative.    All other systems reviewed and are negative.      Physical Exam     Initial Vitals [08/20/22 1234]   BP Pulse Resp Temp SpO2   (!) 162/92 89 20 98.1 °F (36.7 °C) 99 %      MAP       --         Physical Exam    Vitals reviewed.  Constitutional: Vital signs are normal. She appears well-developed and well-nourished.   HENT:   Head: Normocephalic and atraumatic.   Eyes: Conjunctivae, EOM and lids are normal. Pupils are equal, round, and reactive to light.   Neck: Trachea normal. Neck supple.   Normal range of motion.  Cardiovascular: Normal rate, regular rhythm, S1 normal, S2 normal and normal heart sounds.   Pulmonary/Chest: Breath sounds normal. No respiratory distress. She has no wheezes. She has no rales.   Abdominal: Abdomen is soft. Bowel sounds are normal.   Musculoskeletal:         General: Tenderness (tenderness to left hip with palpation, pain with passive ROM. Decreased ROM. ) present.      Cervical back: Normal range of motion and neck supple.      Comments: Left lower extremity rotated but does not appear to be shortened.      Neurological: She is alert and oriented to person, place, and time. She has normal strength.   Skin: Skin is warm and dry.   Bruising noted to left forehead.    Psychiatric: She has a normal mood and affect.         Medical Screening Exam   See Full Note    ED  Course   Procedures  Labs Reviewed   BASIC METABOLIC PANEL - Abnormal; Notable for the following components:       Result Value    Glucose 173 (*)     BUN 31 (*)     Creatinine 1.05 (*)     BUN/Creatinine Ratio 30 (*)     eGFR 53 (*)     All other components within normal limits   CBC WITH DIFFERENTIAL - Abnormal; Notable for the following components:    RBC 3.55 (*)     Hemoglobin 11.8 (*)     Hematocrit 34.3 (*)     MCV 96.6 (*)     MCH 33.2 (*)     MPV 9.0 (*)     Neutrophils % 89.0 (*)     Lymphocytes % 5.5 (*)     Neutrophils, Abs 9.49 (*)     Lymphocytes, Absolute 0.59 (*)     Eosinophils % 0.1 (*)     All other components within normal limits   MANUAL DIFFERENTIAL - Abnormal; Notable for the following components:    Segmented Neutrophils, Man % 83 (*)     Lymphocytes, Man % 7 (*)     Monocytes, Man % 8 (*)     All other components within normal limits   SARS ANTIGEN(BRANDIE) - Normal    Narrative:     Negative SARS-CoV results should not be used as the sole basis for treatment or patient management decisions; negative results should be considered in the context of a patient's recent exposures, history and the presene of clinical signs and symptoms consistent with COVID-19.  Negative results should be treated as presumptive and confirmed by molecular assay, if necessary for patient management.   URINALYSIS, REFLEX TO URINE CULTURE   CBC W/ AUTO DIFFERENTIAL    Narrative:     The following orders were created for panel order CBC auto differential.  Procedure                               Abnormality         Status                     ---------                               -----------         ------                     CBC with Differential[[id number]]        Abnormal            Final result               Manual Differential[[id number]]          Abnormal            Final result                 Please view results for these tests on the individual orders.   PROTIME-INR   APTT          Imaging Results          CT Hip  Without Contrast Left (Final result)  Result time 08/20/22 12:48:32    Final result by Moiz Sharma MD (08/20/22 12:48:32)                 Impression:      Left acute comminuted and angulated intertrochanteric fracture.      Electronically signed by: Moiz Sharma  Date:    08/20/2022  Time:    12:48             Narrative:    EXAMINATION:  CT HIP WITHOUT CONTRAST LEFT    CLINICAL HISTORY:  hip pain recent fall;    TECHNIQUE:  CT of the left hip performed without intravenous contrast    COMPARISON:  None    FINDINGS:  There is a comminuted and angulated left intertrochanteric fracture.  No other fracture detected.                               CT Cervical Spine Without Contrast (Final result)  Result time 08/20/22 12:46:12    Final result by Moiz Sharma MD (08/20/22 12:46:12)                 Impression:      No acute fracture or dislocation of the cervical spine.      Electronically signed by: Moiz Sharma  Date:    08/20/2022  Time:    12:46             Narrative:    EXAMINATION:  CT CERVICAL SPINE WITHOUT CONTRAST    CLINICAL HISTORY:  Neck trauma (Age >= 65y);    TECHNIQUE:  CT of the cervical spine performed without intravenous contrast.    COMPARISON:  None    FINDINGS:  There is no fracture identified.  No dislocation.  Mild degenerative endplate and facet changes seem C3-4 through C6-7.  No high-grade spinal canal or foraminal stenosis.                               CT Head Without Contrast (Final result)  Result time 08/20/22 12:42:27    Final result by Moiz Sharma MD (08/20/22 12:42:27)                 Impression:      No acute intracranial abnormality.      Electronically signed by: Moiz Sharma  Date:    08/20/2022  Time:    12:42             Narrative:    EXAMINATION:  CT HEAD WITHOUT CONTRAST    CLINICAL HISTORY:  Head trauma, minor (Age >= 65y);    TECHNIQUE:  Low dose axial images were obtained through the head.  Coronal and sagittal reformations were also performed. Contrast was not  administered.    COMPARISON:  01/10/2022    FINDINGS:  No acute intracranial hemorrhage, mass, large vessel infarct, or fluid collection.  Mild chronic microvascular ischemic changes as before.  No midline shift.  No herniation.  Calvarium intact.                                 Medications   ondansetron injection 4 mg (4 mg Intravenous Given 8/20/22 1301)   morphine injection 2 mg (2 mg Intravenous Given 8/20/22 1304)   morphine injection 2 mg (2 mg Intravenous Given 8/20/22 1412)                 ED Course as of 08/20/22 1502   Sat Aug 20, 2022   1500 Pt accepted to [facility] (per patient request) to the services of Dr. Aguilera and will be transferred to room 321. Dr. Skinner present and aware of transfer and agrees with transfer.  [SK]      ED Course User Index  [SK] TOMAS Brown          Clinical Impression:   Final diagnoses:  [S72.102A] Closed fracture of trochanter of left femur, initial encounter (Primary)          ED Disposition Condition    Transfer to Another Facility Stable              TOMAS Brown  08/20/22 1502     Quality 402: Tobacco Use And Help With Quitting Among Adolescents: Patient screened for tobacco and is an ex-smoker Quality 47: Advance Care Plan: Advance Care Planning discussed and documented; advance care plan or surrogate decision maker documented in the medical record. Detail Level: Detailed Quality 431: Preventive Care And Screening: Unhealthy Alcohol Use - Screening: Patient identified as an unhealthy alcohol user when screened for unhealthy alcohol use using a systematic screening method and received brief counseling Quality 226: Preventive Care And Screening: Tobacco Use: Screening And Cessation Intervention: Patient screened for tobacco use and is an ex/non-smoker

## 2024-02-06 ENCOUNTER — TELEPHONE (OUTPATIENT)
Dept: FAMILY MEDICINE | Facility: CLINIC | Age: 84
End: 2024-02-06
Payer: MEDICARE

## 2024-02-06 NOTE — TELEPHONE ENCOUNTER
Call made to pt as pt left voicemail requesting med refill for synthroid. Med was sent to [facility] pharmacy on 01/10/2024 90 day supply with 1 refill. Left message for pt to call back.

## 2024-03-26 ENCOUNTER — OFFICE VISIT (OUTPATIENT)
Dept: NEUROLOGY | Facility: CLINIC | Age: 84
End: 2024-03-26
Payer: MEDICARE

## 2024-03-26 VITALS
OXYGEN SATURATION: 98 % | SYSTOLIC BLOOD PRESSURE: 94 MMHG | RESPIRATION RATE: 18 BRPM | DIASTOLIC BLOOD PRESSURE: 80 MMHG | BODY MASS INDEX: 12.56 KG/M2 | HEIGHT: 67 IN | HEART RATE: 98 BPM | WEIGHT: 80 LBS

## 2024-03-26 DIAGNOSIS — F02.80 DEMENTIA IN OTHER DISEASES CLASSIFIED ELSEWHERE, UNSPECIFIED SEVERITY, WITHOUT BEHAVIORAL DISTURBANCE, PSYCHOTIC DISTURBANCE, MOOD DISTURBANCE, AND ANXIETY: Primary | Chronic | ICD-10-CM

## 2024-03-26 PROBLEM — G70.00 MYASTHENIA GRAVIS WITHOUT (ACUTE) EXACERBATION: Chronic | Status: RESOLVED | Noted: 2023-04-19 | Resolved: 2024-03-26

## 2024-03-26 PROCEDURE — 99214 OFFICE O/P EST MOD 30 MIN: CPT | Mod: S$PBB,,, | Performed by: PSYCHIATRY & NEUROLOGY

## 2024-03-26 PROCEDURE — 99215 OFFICE O/P EST HI 40 MIN: CPT | Mod: PBBFAC | Performed by: PSYCHIATRY & NEUROLOGY

## 2024-03-26 NOTE — PROGRESS NOTES
Subjective:       Patient ID: Rosa Alejo is a 83 y.o. female     Chief Complaint:    Chief Complaint   Patient presents with    Dementia     PT. STATES  ABOUT THE SAME.DO NOT WANT THERAPY.TROUBLE SWALLOWING PILLS.        Allergies:  Sulfa (sulfonamide antibiotics)    Current Medications:    Outpatient Encounter Medications as of 3/26/2024   Medication Sig Dispense Refill    aspirin 325 MG tablet Take 325 mg by mouth once daily.      calcium carbonate-vitamin D3 (CALTRATE 600 PLUS D) 600 mg (1,500 mg)-800 unit Chew Take by mouth Daily.      carbidopa-levodopa  mg (SINEMET)  mg per tablet Take 2 tablets by mouth 4 (four) times daily. 720 tablet 3    cetirizine (ZYRTEC) 10 MG tablet Take 1 tablet (10 mg total) by mouth once daily. 90 tablet 1    cholecalciferol, vitamin D3, (VITAMIN D3) 25 mcg (1,000 unit) capsule Take 1,000 Units by mouth once daily.      cyanocobalamin 1,000 mcg/mL injection Every 2 weeks X 3 months, then once monthly X 6 months. 6 mL 2    donepeziL (ARICEPT) 10 MG tablet Take 10 mg by mouth once daily.      indomethacin (INDOCIN) 25 MG capsule Take by mouth.      levothyroxine (SYNTHROID) 100 MCG tablet Take 1 tablet (100 mcg total) by mouth before breakfast. 90 tablet 1    linaCLOtide (LINZESS) 145 mcg Cap capsule Take 145 mcg by mouth Daily.      megestroL (MEGACE) 400 mg/10 mL (40 mg/mL) Susp Take 5 mLs (200 mg total) by mouth 3 (three) times daily with meals. 480 mL 6    memantine (NAMENDA) 5 MG Tab Take 1 tablet (5 mg total) by mouth 2 (two) times daily. 180 tablet 3    montelukast (SINGULAIR) 10 mg tablet Take by mouth.      nitrofurantoin, macrocrystal-monohydrate, (MACROBID) 100 MG capsule Take 1 capsule (100 mg total) by mouth 2 (two) times daily. 14 capsule 0    ondansetron (ZOFRAN) 4 MG tablet Take 4 mg by mouth every 12 (twelve) hours.      ondansetron (ZOFRAN-ODT) 4 MG TbDL Take by mouth.      pantoprazole (PROTONIX) 40 MG tablet 40 mg 2 (two) times daily.       pimavanserin (NUPLAZID) 34 mg Cap Take 1 capsule by mouth once daily.      potassium chloride (MICRO-K) 10 MEQ CpSR Take 2 capsules (20 mEq total) by mouth 2 (two) times daily. Take 2 tablets twice a day 360 capsule 1    QUEtiapine (SEROQUEL) 25 MG Tab Take 1 tablet (25 mg total) by mouth every evening. 90 tablet 3    traMADoL (ULTRAM) 50 mg tablet as needed.      venlafaxine (EFFEXOR-XR) 150 MG Cp24 Take 1 capsule (150 mg total) by mouth once daily. 90 capsule 3     No facility-administered encounter medications on file as of 3/26/2024.       History of Present Illness  82 yo WF w/ dementia severe, prev dx PARKINSON'S DISEASE but I question such? And not taking her Sinemet compliantly ?  NO elements of MYASTHENIA GRAVIS either?   Will consider stopping Sinemet and memory aids as not working   She does get anxious and sundowning in afternoon and early evening per caretaker          Past Medical History:   Diagnosis Date    Dementia     Depression     Hypertension     Hypotension     Hypothyroid     OAB (overactive bladder)     chronic    Parkinson's disease        Past Surgical History:   Procedure Laterality Date    CHOLECYSTECTOMY      ESOPHAGEAL DILATION      HIP SURGERY      Interstim electrode test insertion with x-ray imaging   08/10/2020    at [facility];  test implant at S3 without difficulty;  2.0 mA was the testing and point;  no complications    KNEE SURGERY      LUMPECTOMY,BREAST, WITH RADIOACTIVE SEED LOCALIZATION AND SENTINEL LYMPH NODE BIOPSY      REMOVAL OF SACRAL NEUROSTIMULATOR DEVICE Left 11/22/2021    Procedure: REMOVAL, NEUROSTIMULATOR, SACRAL;  Surgeon: Reza Murphy MD;  Location: [facility];  Service: Pain Management;  Laterality: Left;    SINUS SURGERY      TOTAL ABDOMINAL HYSTERECTOMY W/ BILATERAL SALPINGOOPHORECTOMY         Social History  Ms. Alejo  reports that she has never smoked. She has been exposed to tobacco smoke. She has never used smokeless  "tobacco. She reports that she does not currently use alcohol. She reports that she does not use drugs.    Family History  Ms.'[place] family history includes Heart disease in her father and mother.    Review of Systems  Review of Systems   Neurological:  Positive for tremors.   Psychiatric/Behavioral:  Positive for depression and memory loss. The patient is nervous/anxious.    All other systems reviewed and are negative.     Objective:   BP 94/80 (BP Location: Right arm, Patient Position: Sitting, BP Method: Large (Manual))   Pulse 98   Resp 18   Ht 5' 7" (1.702 m)   Wt 36.3 kg (80 lb)   SpO2 98%   BMI 12.53 kg/m²    NEUROLOGICAL EXAMINATION:     MENTAL STATUS   Level of consciousness: drowsy       Severe dementia      CRANIAL NERVES   Cranial nerves II through XII intact.     MOTOR EXAM        Phys deconditioned     GAIT AND COORDINATION        In wheelchair         Physical Exam  Neurological:      Cranial Nerves: Cranial nerves 2-12 are intact.          Assessment:     Dementia in other diseases classified elsewhere, unspecified severity, without behavioral disturbance, psychotic disturbance, mood disturbance, and anxiety         Primary Diagnosis and ICD10  Dementia in other diseases classified elsewhere, unspecified severity, without behavioral disturbance, psychotic disturbance, mood disturbance, and anxiety [F02.80]    Plan:     Patient Instructions   Stop the Carbidopa-levodopa  Stop the Donepezil and Memantine  Cont Seroquel   F/u 3 months    There are no discontinued medications.    Requested Prescriptions      No prescriptions requested or ordered in this encounter       "

## 2024-04-11 RX ORDER — PANTOPRAZOLE SODIUM 40 MG/1
40 TABLET, DELAYED RELEASE ORAL 2 TIMES DAILY
Qty: 180 TABLET | Refills: 1 | Status: SHIPPED | OUTPATIENT
Start: 2024-04-11

## 2024-04-23 NOTE — TELEPHONE ENCOUNTER
Call made to pt daughter Patti. States that the otc imodium is helping some but feels like it is not strong enough. Verified pharmacy as [facility]. States pt asad fever, recent antibiotics, and home health. Advised if she changes her mind about home health to give this facility a call back.

## 2024-04-23 NOTE — TELEPHONE ENCOUNTER
----- Message from Roxana Miller MD sent at 4/23/2024 12:36 PM CDT -----  Does patient have home health? Does she need them to come back out if she does not currently have them? Has she tried OTC imodium? Is she having fever? Has she been on antibiotics or been sick. If no fever and has tried OTC imodium she can try lomotil. Thanks!  ----- Message -----  From: Delilah Diego LPN  Sent: 4/23/2024  12:02 PM CDT  To: Roxana Miller MD      ----- Message -----  From: Sylvia Haynes  Sent: 4/23/2024  11:59 AM CDT  To: Ericka Lucas Staff    Pt's daughter Andreea Ramirez is asking if Dr. Talley could send something to [facility], [place] - [address] [facility] [address] to help the pt with diarrhea. If needed pt's daughter can be reached at [phone].

## 2024-04-24 RX ORDER — DIPHENOXYLATE HYDROCHLORIDE AND ATROPINE SULFATE 2.5; .025 MG/1; MG/1
1 TABLET ORAL 4 TIMES DAILY PRN
Qty: 20 TABLET | Refills: 0 | Status: SHIPPED | OUTPATIENT
Start: 2024-04-24 | End: 2024-06-11 | Stop reason: SDUPTHER

## 2024-05-15 ENCOUNTER — OFFICE VISIT (OUTPATIENT)
Dept: FAMILY MEDICINE | Facility: CLINIC | Age: 84
End: 2024-05-15
Payer: MEDICARE

## 2024-05-15 VITALS
WEIGHT: 86.81 LBS | RESPIRATION RATE: 18 BRPM | BODY MASS INDEX: 13.59 KG/M2 | OXYGEN SATURATION: 100 % | DIASTOLIC BLOOD PRESSURE: 84 MMHG | HEART RATE: 100 BPM | TEMPERATURE: 99 F | SYSTOLIC BLOOD PRESSURE: 138 MMHG

## 2024-05-15 DIAGNOSIS — R30.0 DYSURIA: Primary | ICD-10-CM

## 2024-05-15 DIAGNOSIS — E03.8 OTHER SPECIFIED HYPOTHYROIDISM: Chronic | ICD-10-CM

## 2024-05-15 PROCEDURE — 99213 OFFICE O/P EST LOW 20 MIN: CPT | Mod: ,,, | Performed by: FAMILY MEDICINE

## 2024-05-15 PROCEDURE — 81003 URINALYSIS AUTO W/O SCOPE: CPT | Mod: RHCUB | Performed by: FAMILY MEDICINE

## 2024-05-15 RX ORDER — LEVOTHYROXINE SODIUM 100 UG/1
100 TABLET ORAL
Qty: 90 TABLET | Refills: 1 | Status: SHIPPED | OUTPATIENT
Start: 2024-05-15

## 2024-05-15 NOTE — PROGRESS NOTES
Clinic Note    Patient Name: Rosa Alejo  : 1940  MRN: [id number]    Chief Complaint   Patient presents with    Annual Exam     Check for UTI    Health Maintenance     TETANUS VACCINE Never done  DEXA Scan Never done  Shingles Vaccine(1 of 2) Never done  RSV Vaccine (Age 60+ and Pregnant patients)(1 - 1-dose 60+ series) Never done  Pneumococcal Vaccines (Age 65+)(3 of 3 - PPSV23 or PCV20) due on 10/15/2020  COVID-19 Vaccine(2023- season) due on 2023        HPI:    Ms. Rosa Alejo is a 83 y.o. female who presents to clinic today with CC of strong odor to urine.   Patient is accompanied to this visit by her sitter. Sitter is a good historian.   Sitter reports strong odor to urine and increasing confusion. Denies fever. Symptoms X 3-4 days. However, patient was unable to leave urine sample while in clinic today. Sitter advised she would take her home and return urine sample to lab when she was able to collect it.   Patient is, otherwise, without complaints.     Medications:  Medication List with Changes/Refills   Current Medications    ASPIRIN 325 MG TABLET    Take 325 mg by mouth once daily.    CALCIUM CARBONATE-VITAMIN D3 (CALTRATE 600 PLUS D) 600 MG (1,500 MG)-800 UNIT CHEW    Take by mouth Daily.    CARBIDOPA-LEVODOPA  MG (SINEMET)  MG PER TABLET    Take 2 tablets by mouth 4 (four) times daily.    CETIRIZINE (ZYRTEC) 10 MG TABLET    Take 1 tablet (10 mg total) by mouth once daily.    CHOLECALCIFEROL, VITAMIN D3, (VITAMIN D3) 25 MCG (1,000 UNIT) CAPSULE    Take 1,000 Units by mouth once daily.    CYANOCOBALAMIN 1,000 MCG/ML INJECTION    Every 2 weeks X 3 months, then once monthly X 6 months.    DIPHENOXYLATE-ATROPINE 2.5-0.025 MG (LOMOTIL) 2.5-0.025 MG PER TABLET    Take 1 tablet by mouth 4 (four) times daily as needed for Diarrhea.    DONEPEZIL (ARICEPT) 10 MG TABLET    Take 10 mg by mouth once daily.    INDOMETHACIN (INDOCIN) 25 MG CAPSULE    Take by mouth.    LINACLOTIDE  (LINZESS) 145 MCG CAP CAPSULE    Take 1 capsule (145 mcg total) by mouth Daily.    MEGESTROL (MEGACE) 400 MG/10 ML (40 MG/ML) SUSP    Take 5 mLs (200 mg total) by mouth 3 (three) times daily with meals.    MEMANTINE (NAMENDA) 5 MG TAB    Take 1 tablet (5 mg total) by mouth 2 (two) times daily.    MONTELUKAST (SINGULAIR) 10 MG TABLET    Take by mouth.    NITROFURANTOIN, MACROCRYSTAL-MONOHYDRATE, (MACROBID) 100 MG CAPSULE    Take 1 capsule (100 mg total) by mouth 2 (two) times daily.    ONDANSETRON (ZOFRAN) 4 MG TABLET    Take 4 mg by mouth every 12 (twelve) hours.    ONDANSETRON (ZOFRAN-ODT) 4 MG TBDL    Take by mouth.    PANTOPRAZOLE (PROTONIX) 40 MG TABLET    Take 1 tablet (40 mg total) by mouth 2 (two) times daily.    PIMAVANSERIN (NUPLAZID) 34 MG CAP    Take 1 capsule by mouth once daily.    POTASSIUM CHLORIDE (MICRO-K) 10 MEQ CPSR    Take 2 capsules (20 mEq total) by mouth 2 (two) times daily. Take 2 tablets twice a day    QUETIAPINE (SEROQUEL) 25 MG TAB    Take 1 tablet (25 mg total) by mouth every evening.    TRAMADOL (ULTRAM) 50 MG TABLET    as needed.    VENLAFAXINE (EFFEXOR-XR) 150 MG CP24    Take 1 capsule (150 mg total) by mouth once daily.   Changed and/or Refilled Medications    Modified Medication Previous Medication    LEVOTHYROXINE (SYNTHROID) 100 MCG TABLET levothyroxine (SYNTHROID) 100 MCG tablet       Take 1 tablet (100 mcg total) by mouth before breakfast.    Take 1 tablet (100 mcg total) by mouth before breakfast.        Allergies: Sulfa (sulfonamide antibiotics)      Past Medical History:    Past Medical History:   Diagnosis Date    Dementia     Depression     Hypertension     Hypotension     Hypothyroid     OAB (overactive bladder)     chronic    Parkinson's disease        Past Surgical History:    Past Surgical History:   Procedure Laterality Date    CHOLECYSTECTOMY      ESOPHAGEAL DILATION      HIP SURGERY      Interstim electrode test insertion with x-ray imaging   08/10/2020    at [facility];  test implant at S3 without difficulty;  2.0 mA was the testing and point;  no complications    KNEE SURGERY      LUMPECTOMY,BREAST, WITH RADIOACTIVE SEED LOCALIZATION AND SENTINEL LYMPH NODE BIOPSY      REMOVAL OF SACRAL NEUROSTIMULATOR DEVICE Left 11/22/2021    Procedure: REMOVAL, NEUROSTIMULATOR, SACRAL;  Surgeon: Reza Murphy MD;  Location: [facility];  Service: Pain Management;  Laterality: Left;    SINUS SURGERY      TOTAL ABDOMINAL HYSTERECTOMY W/ BILATERAL SALPINGOOPHORECTOMY           Social History:    Social History     Tobacco Use   Smoking Status Never    Passive exposure: Past   Smokeless Tobacco Never     Social History     Substance and Sexual Activity   Alcohol Use Not Currently     Social History     Substance and Sexual Activity   Drug Use Never         Family History:    Family History   Problem Relation Name Age of Onset    Heart disease Father      Heart disease Mother         Review of Systems:    Review of Systems   Constitutional:  Negative for appetite change, chills, fatigue, fever and unexpected weight change.   Eyes:  Negative for visual disturbance.   Respiratory:  Negative for cough and shortness of breath.    Cardiovascular:  Negative for chest pain and leg swelling.   Gastrointestinal:  Positive for abdominal pain and nausea. Negative for change in bowel habit, constipation, diarrhea and vomiting.   Genitourinary:         + odor to urine   Musculoskeletal:  Negative for arthralgias.   Integumentary:  Negative for rash.   Neurological:  Negative for dizziness and headaches.   Psychiatric/Behavioral:  The patient is not nervous/anxious.         Vitals:    Vitals:    05/15/24 1336   BP: 138/84   BP Location: Left arm   Patient Position: Sitting   BP Method: Small (Automatic)   Pulse: 100   Resp: 18   Temp: 98.7 °F (37.1 °C)   TempSrc: Oral   SpO2: 100%   Weight: 39.4 kg (86 lb 12.8 oz)       Body mass index is 13.59 kg/m².    Wt Readings from  Last 3 Encounters:   05/15/24 1336 39.4 kg (86 lb 12.8 oz)   03/26/24 0843 36.3 kg (80 lb)   10/03/23 0941 40.8 kg (89 lb 14.4 oz)        Physical Exam:    Physical Exam  Constitutional:       General: She is not in acute distress.     Comments: Appears chronically ill but stable and in no acute distress   HENT:      Nose: Nose normal.      Mouth/Throat:      Mouth: Mucous membranes are moist.      Pharynx: Oropharynx is clear.   Eyes:      Conjunctiva/sclera: Conjunctivae normal.   Cardiovascular:      Rate and Rhythm: Normal rate and regular rhythm.      Heart sounds: Normal heart sounds. No murmur heard.  Pulmonary:      Effort: Pulmonary effort is normal. No respiratory distress.      Breath sounds: Normal breath sounds. No wheezing, rhonchi or rales.   Abdominal:      General: Bowel sounds are normal.      Palpations: Abdomen is soft.      Tenderness: There is no abdominal tenderness. There is no right CVA tenderness, left CVA tenderness, guarding or rebound.   Musculoskeletal:      Cervical back: Neck supple.   Skin:     Findings: No rash.   Neurological:      Mental Status: She is alert. Mental status is at baseline.      Comments: + in wheelchair   Psychiatric:         Mood and Affect: Mood normal.       Assessment/Plan:   1. Dysuria  -     POCT URINALYSIS W/O SCOPE    2. Other specified hypothyroidism  -     levothyroxine (SYNTHROID) 100 MCG tablet; Take 1 tablet (100 mcg total) by mouth before breakfast.  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    Chronic obstructive pulmonary disease, unspecified COPD type 04/19/2023    Dementia in other diseases classified elsewhere, unspecified severity, without behavioral disturbance, psychotic disturbance, mood disturbance, and anxiety 04/19/2023    Anxiety 04/19/2023    Depression 04/19/2023    Vitamin B12 deficiency 04/19/2023    Incontinence 02/11/2022     Hypothyroidism 09/30/2021    OAB (overactive bladder) 04/20/2021    Moderate protein-calorie malnutrition 04/19/2023    Rheumatoid arthritis involving multiple sites, unspecified whether rheumatoid factor present 04/19/2023    Hypokalemia 04/19/2023        RTC as scheduled for chronic follow up. RTC sooner if needed.  Patient voiced understanding and is agreeable to plan.      Rosa Miller MD    Family Medicine

## 2024-05-23 LAB
BILIRUB SERPL-MCNC: NORMAL MG/DL
BLOOD URINE, POC: NORMAL
COLOR, POC UA: NORMAL
GLUCOSE UR QL STRIP: NORMAL
KETONES UR QL STRIP: 15
LEUKOCYTE ESTERASE URINE, POC: NORMAL
NITRITE, POC UA: NORMAL
PH, POC UA: 5.5
PROTEIN, POC: 30
SPECIFIC GRAVITY, POC UA: 1.02
UROBILINOGEN, POC UA: 1

## 2024-05-24 RX ORDER — NITROFURANTOIN 25; 75 MG/1; MG/1
100 CAPSULE ORAL 2 TIMES DAILY
Qty: 14 CAPSULE | Refills: 0 | Status: SHIPPED | OUTPATIENT
Start: 2024-05-24

## 2024-05-24 NOTE — TELEPHONE ENCOUNTER
Call made to pt regarding lab results. Pt caregiver answered phone and verified pt . Voiced understanding and verified pharmacy as [facility].

## 2024-05-24 NOTE — TELEPHONE ENCOUNTER
----- Message from Roxana Miller MD sent at 5/23/2024  4:26 PM CDT -----  Please call patient/caregiver regarding lab results. Urinalysis concerning for UTI. Culture pending. Will call if have to change antibiotics when culture results are available. Recommend macrobid 100 mg PO BID X 7 days.   Thanks!

## 2024-06-05 ENCOUNTER — TELEPHONE (OUTPATIENT)
Dept: FAMILY MEDICINE | Facility: CLINIC | Age: 84
End: 2024-06-05
Payer: MEDICARE

## 2024-06-05 NOTE — TELEPHONE ENCOUNTER
----- Message from Odin Garcia MA sent at 6/4/2024  2:19 PM CDT -----  Regarding: FW: call back    ----- Message -----  From: Mackenzie Quesada  Sent: 6/4/2024   1:44 PM CDT  To: Ericka Lucas Staff  Subject: call back                                        Pt is requesting a call back about her lab results states that she had labs done last week and have not heard anything contact number [phone].

## 2024-06-11 RX ORDER — DIPHENOXYLATE HYDROCHLORIDE AND ATROPINE SULFATE 2.5; .025 MG/1; MG/1
1 TABLET ORAL 4 TIMES DAILY PRN
Qty: 20 TABLET | Refills: 0 | Status: SHIPPED | OUTPATIENT
Start: 2024-06-11

## 2024-06-14 DIAGNOSIS — E03.8 OTHER SPECIFIED HYPOTHYROIDISM: Chronic | ICD-10-CM

## 2024-06-14 RX ORDER — LEVOTHYROXINE SODIUM 100 UG/1
100 TABLET ORAL
Qty: 90 TABLET | Refills: 1 | OUTPATIENT
Start: 2024-06-14

## 2024-06-14 RX ORDER — CETIRIZINE HYDROCHLORIDE 10 MG/1
10 TABLET ORAL DAILY
Qty: 90 TABLET | Refills: 1 | Status: SHIPPED | OUTPATIENT
Start: 2024-06-14

## 2024-06-20 DIAGNOSIS — F32.89 OTHER DEPRESSION: Chronic | ICD-10-CM

## 2024-06-20 DIAGNOSIS — F41.9 ANXIETY: Chronic | ICD-10-CM

## 2024-06-24 RX ORDER — QUETIAPINE FUMARATE 25 MG/1
25 TABLET, FILM COATED ORAL NIGHTLY
Qty: 90 TABLET | Refills: 3 | Status: SHIPPED | OUTPATIENT
Start: 2024-06-24

## 2024-07-09 DIAGNOSIS — Z71.89 COMPLEX CARE COORDINATION: ICD-10-CM

## 2024-08-01 ENCOUNTER — OFFICE VISIT (OUTPATIENT)
Dept: FAMILY MEDICINE | Facility: CLINIC | Age: 84
End: 2024-08-01
Payer: MEDICARE

## 2024-08-01 VITALS
HEART RATE: 82 BPM | BODY MASS INDEX: 14.33 KG/M2 | OXYGEN SATURATION: 96 % | SYSTOLIC BLOOD PRESSURE: 100 MMHG | TEMPERATURE: 99 F | DIASTOLIC BLOOD PRESSURE: 62 MMHG | RESPIRATION RATE: 18 BRPM | HEIGHT: 65 IN | WEIGHT: 86 LBS

## 2024-08-01 DIAGNOSIS — R54 FRAILTY: ICD-10-CM

## 2024-08-01 DIAGNOSIS — Z13.220 SCREENING, LIPID: ICD-10-CM

## 2024-08-01 DIAGNOSIS — Z79.899 OTHER LONG TERM (CURRENT) DRUG THERAPY: ICD-10-CM

## 2024-08-01 DIAGNOSIS — F41.9 ANXIETY: Chronic | ICD-10-CM

## 2024-08-01 DIAGNOSIS — F32.89 OTHER DEPRESSION: Chronic | ICD-10-CM

## 2024-08-01 DIAGNOSIS — M25.50 ARTHRALGIA, UNSPECIFIED JOINT: ICD-10-CM

## 2024-08-01 DIAGNOSIS — N32.81 OAB (OVERACTIVE BLADDER): Chronic | ICD-10-CM

## 2024-08-01 DIAGNOSIS — E53.8 VITAMIN B12 DEFICIENCY: Chronic | ICD-10-CM

## 2024-08-01 DIAGNOSIS — E87.6 HYPOKALEMIA: Chronic | ICD-10-CM

## 2024-08-01 DIAGNOSIS — F02.80 DEMENTIA IN OTHER DISEASES CLASSIFIED ELSEWHERE, UNSPECIFIED SEVERITY, WITHOUT BEHAVIORAL DISTURBANCE, PSYCHOTIC DISTURBANCE, MOOD DISTURBANCE, AND ANXIETY: Chronic | ICD-10-CM

## 2024-08-01 DIAGNOSIS — E03.9 HYPOTHYROIDISM, UNSPECIFIED TYPE: Primary | ICD-10-CM

## 2024-08-01 DIAGNOSIS — R82.90 ABNORMAL URINE ODOR: ICD-10-CM

## 2024-08-01 DIAGNOSIS — Z13.1 SCREENING FOR DIABETES MELLITUS: ICD-10-CM

## 2024-08-01 DIAGNOSIS — M06.9 RHEUMATOID ARTHRITIS INVOLVING MULTIPLE SITES, UNSPECIFIED WHETHER RHEUMATOID FACTOR PRESENT: Chronic | ICD-10-CM

## 2024-08-01 DIAGNOSIS — Z99.89 WALKER AS AMBULATION AID: ICD-10-CM

## 2024-08-01 LAB
25(OH)D3 SERPL-MCNC: 52.4 NG/ML
ALBUMIN SERPL BCP-MCNC: 4.3 G/DL (ref 3.5–5)
ALBUMIN/GLOB SERPL: 1.3 {RATIO}
ALP SERPL-CCNC: 73 U/L (ref 55–142)
ALT SERPL W P-5'-P-CCNC: 11 U/L (ref 13–56)
ANION GAP SERPL CALCULATED.3IONS-SCNC: 10 MMOL/L (ref 7–16)
AST SERPL W P-5'-P-CCNC: 24 U/L (ref 15–37)
BASOPHILS # BLD AUTO: 0.03 K/UL (ref 0–0.2)
BASOPHILS NFR BLD AUTO: 0.3 % (ref 0–1)
BILIRUB SERPL-MCNC: 0.6 MG/DL (ref ?–1.2)
BUN SERPL-MCNC: 34 MG/DL (ref 7–18)
BUN/CREAT SERPL: 33 (ref 6–20)
CALCIUM SERPL-MCNC: 9.3 MG/DL (ref 8.5–10.1)
CHLORIDE SERPL-SCNC: 111 MMOL/L (ref 98–107)
CHOLEST SERPL-MCNC: 207 MG/DL (ref 0–200)
CHOLEST/HDLC SERPL: 3.1 {RATIO}
CO2 SERPL-SCNC: 24 MMOL/L (ref 21–32)
CREAT SERPL-MCNC: 1.04 MG/DL (ref 0.55–1.02)
CRP SERPL-MCNC: <0.29 MG/DL (ref 0–0.8)
DIFFERENTIAL METHOD BLD: ABNORMAL
EGFR (NO RACE VARIABLE) (RUSH/TITUS): 53 ML/MIN/1.73M2
EOSINOPHIL # BLD AUTO: 0.04 K/UL (ref 0–0.5)
EOSINOPHIL NFR BLD AUTO: 0.5 % (ref 1–4)
ERYTHROCYTE [DISTWIDTH] IN BLOOD BY AUTOMATED COUNT: 13.3 % (ref 11.5–14.5)
ERYTHROCYTE [SEDIMENTATION RATE] IN BLOOD BY WESTERGREN METHOD: 13 MM/HR (ref 0–30)
EST. AVERAGE GLUCOSE BLD GHB EST-MCNC: 97 MG/DL
GLOBULIN SER-MCNC: 3.4 G/DL (ref 2–4)
GLUCOSE SERPL-MCNC: 82 MG/DL (ref 74–106)
HBA1C MFR BLD HPLC: 5 % (ref 4.5–6.6)
HCT VFR BLD AUTO: 37.7 % (ref 38–47)
HDLC SERPL-MCNC: 66 MG/DL (ref 40–60)
HGB BLD-MCNC: 12.5 G/DL (ref 12–16)
IMM GRANULOCYTES # BLD AUTO: 0.05 K/UL (ref 0–0.04)
IMM GRANULOCYTES NFR BLD: 0.6 % (ref 0–0.4)
LDLC SERPL CALC-MCNC: 127 MG/DL
LDLC/HDLC SERPL: 1.9 {RATIO}
LYMPHOCYTES # BLD AUTO: 2.1 K/UL (ref 1–4.8)
LYMPHOCYTES NFR BLD AUTO: 24.4 % (ref 27–41)
MCH RBC QN AUTO: 33.5 PG (ref 27–31)
MCHC RBC AUTO-ENTMCNC: 33.2 G/DL (ref 32–36)
MCV RBC AUTO: 101.1 FL (ref 80–96)
MONOCYTES # BLD AUTO: 0.4 K/UL (ref 0–0.8)
MONOCYTES NFR BLD AUTO: 4.7 % (ref 2–6)
MPC BLD CALC-MCNC: 9.6 FL (ref 9.4–12.4)
NEUTROPHILS # BLD AUTO: 5.98 K/UL (ref 1.8–7.7)
NEUTROPHILS NFR BLD AUTO: 69.5 % (ref 53–65)
NONHDLC SERPL-MCNC: 141 MG/DL
NRBC # BLD AUTO: 0 X10E3/UL
NRBC, AUTO (.00): 0 %
PLATELET # BLD AUTO: 410 K/UL (ref 150–400)
POTASSIUM SERPL-SCNC: 4.6 MMOL/L (ref 3.5–5.1)
PROT SERPL-MCNC: 7.7 G/DL (ref 6.4–8.2)
RBC # BLD AUTO: 3.73 M/UL (ref 4.2–5.4)
RHEUMATOID FACT SER NEPH-ACNC: NEGATIVE [IU]/ML
SODIUM SERPL-SCNC: 140 MMOL/L (ref 136–145)
TRIGL SERPL-MCNC: 70 MG/DL (ref 35–150)
TSH SERPL DL<=0.005 MIU/L-ACNC: 9.49 UIU/ML (ref 0.36–3.74)
URATE SERPL-MCNC: 5 MG/DL (ref 2.6–6)
VIT B12 SERPL-MCNC: 446 PG/ML (ref 193–986)
VLDLC SERPL-MCNC: 14 MG/DL
WBC # BLD AUTO: 8.6 K/UL (ref 4.5–11)

## 2024-08-01 PROCEDURE — 84443 ASSAY THYROID STIM HORMONE: CPT | Mod: ,,, | Performed by: CLINICAL MEDICAL LABORATORY

## 2024-08-01 PROCEDURE — 85025 COMPLETE CBC W/AUTO DIFF WBC: CPT | Mod: ,,, | Performed by: CLINICAL MEDICAL LABORATORY

## 2024-08-01 PROCEDURE — 86430 RHEUMATOID FACTOR TEST QUAL: CPT | Mod: ,,, | Performed by: CLINICAL MEDICAL LABORATORY

## 2024-08-01 PROCEDURE — 80053 COMPREHEN METABOLIC PANEL: CPT | Mod: ,,, | Performed by: CLINICAL MEDICAL LABORATORY

## 2024-08-01 PROCEDURE — 86140 C-REACTIVE PROTEIN: CPT | Mod: ,,, | Performed by: CLINICAL MEDICAL LABORATORY

## 2024-08-01 PROCEDURE — 82607 VITAMIN B-12: CPT | Mod: ,,, | Performed by: CLINICAL MEDICAL LABORATORY

## 2024-08-01 PROCEDURE — 83036 HEMOGLOBIN GLYCOSYLATED A1C: CPT | Mod: ,,, | Performed by: CLINICAL MEDICAL LABORATORY

## 2024-08-01 PROCEDURE — 85651 RBC SED RATE NONAUTOMATED: CPT | Mod: ,,, | Performed by: CLINICAL MEDICAL LABORATORY

## 2024-08-01 PROCEDURE — 80061 LIPID PANEL: CPT | Mod: ,,, | Performed by: CLINICAL MEDICAL LABORATORY

## 2024-08-01 PROCEDURE — 84550 ASSAY OF BLOOD/URIC ACID: CPT | Mod: ,,, | Performed by: CLINICAL MEDICAL LABORATORY

## 2024-08-01 PROCEDURE — 99214 OFFICE O/P EST MOD 30 MIN: CPT | Mod: ,,, | Performed by: FAMILY MEDICINE

## 2024-08-01 PROCEDURE — 82306 VITAMIN D 25 HYDROXY: CPT | Mod: ,,, | Performed by: CLINICAL MEDICAL LABORATORY

## 2024-08-01 NOTE — LETTER
[facility]  [address]  [place] [address]  Phone: [phone]  Fax: [phone] August 1, 2024     Patient: Rosa Alejo   YOB: 1940   Date of Visit: 8/1/2024       To Whom It May Concern:        If you have any questions or concerns, please don't hesitate to contact my office.    Sincerely,        Roxana Miller MD

## 2024-08-01 NOTE — PROGRESS NOTES
Clinic Note    Patient Name: Rosa Alejo  : 1940  MRN: [id number]    Chief Complaint   Patient presents with    Follow-up     Six months follow up , request visit in car at front door    Health Maintenance     TETANUS VACCINE Never done  DEXA Scan Never done  Shingles Vaccine(1 of 2) Never done  RSV Vaccine (Age 60+ and Pregnant patients)(1 - 1-dose 60+ series) Never done  Pneumococcal Vaccines (Age 65+)(3 of 3 - PPSV23 or PCV20) due on 10/15/2020  COVID-19 Vaccine( - - season) due on 2023        HPI:    Ms. Rosa Alejo is a 84 y.o. female who presents to clinic today with CC of follow up on chronic disease processes including Hypothyroidism, OAB, adrenocortical insufficiency, RA, COPD, dementia, hypokalemia, anxiety, depression, and vitamin B12 deficiency.   She is accompanied to this visit by her sitter.  Sitter reports she is having a strong odor to her urine. Reports confusion and states she does not ambulate as well as she has previously. Reports, however, she complains of a lot of knee pain. She is seeing ortho for this issue and receiving injections. Denies any acute changes. States it could be related to progression of dementia and parkinsons disease. States she is eating well. Denies fever.  Patient reports chronic issues are stable on current medication regimen.  Denies problems or side effects with medications.  Patient is, otherwise, without complaints.     Medications:  Medication List with Changes/Refills   Current Medications    ASPIRIN 325 MG TABLET    Take 325 mg by mouth once daily.    CALCIUM CARBONATE-VITAMIN D3 (CALTRATE 600 PLUS D) 600 MG (1,500 MG)-800 UNIT CHEW    Take by mouth Daily.    CARBIDOPA-LEVODOPA  MG (SINEMET)  MG PER TABLET    Take 2 tablets by mouth 4 (four) times daily.    CETIRIZINE (ZYRTEC) 10 MG TABLET    Take 1 tablet (10 mg total) by mouth once daily.    CHOLECALCIFEROL, VITAMIN D3, (VITAMIN D3) 25 MCG (1,000 UNIT) CAPSULE    Take 1,000  Units by mouth once daily.    CYANOCOBALAMIN 1,000 MCG/ML INJECTION    Every 2 weeks X 3 months, then once monthly X 6 months.    DIPHENOXYLATE-ATROPINE 2.5-0.025 MG (LOMOTIL) 2.5-0.025 MG PER TABLET    Take 1 tablet by mouth 4 (four) times daily as needed for Diarrhea.    DONEPEZIL (ARICEPT) 10 MG TABLET    Take 10 mg by mouth once daily.    INDOMETHACIN (INDOCIN) 25 MG CAPSULE    Take by mouth.    LEVOTHYROXINE (SYNTHROID) 100 MCG TABLET    Take 1 tablet (100 mcg total) by mouth before breakfast.    LINACLOTIDE (LINZESS) 145 MCG CAP CAPSULE    Take 1 capsule (145 mcg total) by mouth Daily.    MEGESTROL (MEGACE) 400 MG/10 ML (40 MG/ML) SUSP    Take 5 mLs (200 mg total) by mouth 3 (three) times daily with meals.    MEMANTINE (NAMENDA) 5 MG TAB    Take 1 tablet (5 mg total) by mouth 2 (two) times daily.    MONTELUKAST (SINGULAIR) 10 MG TABLET    Take by mouth.    NITROFURANTOIN, MACROCRYSTAL-MONOHYDRATE, (MACROBID) 100 MG CAPSULE    Take 1 capsule (100 mg total) by mouth 2 (two) times daily.    ONDANSETRON (ZOFRAN) 4 MG TABLET    Take 4 mg by mouth every 12 (twelve) hours.    ONDANSETRON (ZOFRAN-ODT) 4 MG TBDL    Take by mouth.    PANTOPRAZOLE (PROTONIX) 40 MG TABLET    Take 1 tablet (40 mg total) by mouth 2 (two) times daily.    PIMAVANSERIN (NUPLAZID) 34 MG CAP    Take 1 capsule by mouth once daily.    POTASSIUM CHLORIDE (MICRO-K) 10 MEQ CPSR    Take 2 capsules (20 mEq total) by mouth 2 (two) times daily. Take 2 tablets twice a day    QUETIAPINE (SEROQUEL) 25 MG TAB    TAKE 1 TABLET (25 MG TOTAL) BY MOUTH EVERY EVENING.    TRAMADOL (ULTRAM) 50 MG TABLET    as needed.    VENLAFAXINE (EFFEXOR-XR) 150 MG CP24    Take 1 capsule (150 mg total) by mouth once daily.        Allergies: Sulfa (sulfonamide antibiotics)      Past Medical History:    Past Medical History:   Diagnosis Date    Dementia     Depression     Hypertension     Hypotension     Hypothyroid     OAB (overactive bladder)     chronic    Parkinson's disease         Past Surgical History:    Past Surgical History:   Procedure Laterality Date    CHOLECYSTECTOMY      ESOPHAGEAL DILATION      HIP SURGERY      Interstim electrode test insertion with x-ray imaging   08/10/2020    at [facility];  test implant at S3 without difficulty;  2.0 mA was the testing and point;  no complications    KNEE SURGERY      LUMPECTOMY,BREAST, WITH RADIOACTIVE SEED LOCALIZATION AND SENTINEL LYMPH NODE BIOPSY      REMOVAL OF SACRAL NEUROSTIMULATOR DEVICE Left 11/22/2021    Procedure: REMOVAL, NEUROSTIMULATOR, SACRAL;  Surgeon: Reza Murphy MD;  Location: [facility] OR;  Service: Pain Management;  Laterality: Left;    SINUS SURGERY      TOTAL ABDOMINAL HYSTERECTOMY W/ BILATERAL SALPINGOOPHORECTOMY           Social History:    Social History     Tobacco Use   Smoking Status Never    Passive exposure: Past   Smokeless Tobacco Never     Social History     Substance and Sexual Activity   Alcohol Use Not Currently     Social History     Substance and Sexual Activity   Drug Use Never         Family History:    Family History   Problem Relation Name Age of Onset    Heart disease Father      Heart disease Mother         Review of Systems:    Review of Systems   Constitutional:  Negative for appetite change, chills, fatigue, fever and unexpected weight change.   Eyes:  Negative for visual disturbance.   Respiratory:  Negative for cough and shortness of breath.    Cardiovascular:  Negative for chest pain and leg swelling.   Gastrointestinal:  Negative for abdominal pain, change in bowel habit, constipation, diarrhea, nausea and vomiting.   Musculoskeletal:  Positive for arthralgias.   Integumentary:  Negative for rash.   Neurological:  Negative for dizziness and headaches.   Psychiatric/Behavioral:  Positive for confusion. The patient is not nervous/anxious.         Vitals:    Vitals:    08/01/24 1324   BP: 100/62   BP Location: Right arm   Patient Position: Sitting   BP  "Method: Medium (Manual)   Pulse: 82   Resp: 18   Temp: 98.6 °F (37 °C)   TempSrc: Oral   SpO2: 96%   Weight: 39 kg (86 lb)   Height: 5' 5" (1.651 m)       Body mass index is 14.31 kg/m².    Wt Readings from Last 3 Encounters:   08/01/24 1324 39 kg (86 lb)   05/15/24 1336 39.4 kg (86 lb 12.8 oz)   03/26/24 0843 36.3 kg (80 lb)        Physical Exam:    Physical Exam  Constitutional:       General: She is not in acute distress.     Comments: Appears chronically ill but stable and in no acute distress   HENT:      Nose: Nose normal.      Mouth/Throat:      Mouth: Mucous membranes are moist.      Pharynx: Oropharynx is clear.   Eyes:      Conjunctiva/sclera: Conjunctivae normal.   Cardiovascular:      Rate and Rhythm: Normal rate and regular rhythm.      Heart sounds: Normal heart sounds. No murmur heard.  Pulmonary:      Effort: Pulmonary effort is normal. No respiratory distress.      Breath sounds: Normal breath sounds. No wheezing, rhonchi or rales.   Abdominal:      General: Bowel sounds are normal.      Palpations: Abdomen is soft.      Tenderness: There is no abdominal tenderness.   Musculoskeletal:      Cervical back: Neck supple.      Right lower leg: No edema.      Left lower leg: No edema.   Skin:     Findings: No rash.   Neurological:      Mental Status: She is alert. Mental status is at baseline.      Comments: + confusion - h/o dementia at baseline   Psychiatric:         Mood and Affect: Mood normal.           Assessment/Plan:   1. Hypothyroidism, unspecified type  -     CBC Auto Differential; Future; Expected date: 08/01/2024  -     Comprehensive Metabolic Panel; Future; Expected date: 08/01/2024  -     Lipid Panel; Future; Expected date: 08/01/2024  -     TSH; Future; Expected date: 08/01/2024    2. OAB (overactive bladder)  The current medical regimen is effective;  continue present plan and medications.    3. Rheumatoid arthritis involving multiple sites, unspecified whether rheumatoid factor " present  The current medical regimen is effective;  continue present plan and medications.    4. Dementia in other diseases classified elsewhere, unspecified severity, without behavioral disturbance, psychotic disturbance, mood disturbance, and anxiety  The current medical regimen is effective;  continue present plan and medications.    5. Hypokalemia  -     Comprehensive Metabolic Panel; Future; Expected date: 08/01/2024    6. Anxiety  The current medical regimen is effective;  continue present plan and medications.    7. Other depression  The current medical regimen is effective;  continue present plan and medications.    8. Vitamin B12 deficiency  -     Vitamin B12; Future; Expected date: 08/01/2024    9. Arthralgia, unspecified joint  -     Vitamin D; Future; Expected date: 08/01/2024  -     Uric Acid; Future; Expected date: 08/01/2024  -     ROBYN EIA w/ Reflex to dsDNA/KD; Future; Expected date: 08/01/2024  -     Rheumatoid Factor Screen; Future; Expected date: 08/01/2024  -     C-Reactive Protein; Future; Expected date: 08/01/2024  -     Sedimentation Rate; Future; Expected date: 08/01/2024    10. Other long term (current) drug therapy  -     Hemoglobin A1C; Future; Expected date: 08/01/2024  -     Microalbumin/Creatinine Ratio, Urine  -     Vitamin D; Future; Expected date: 08/01/2024    11. Screening for diabetes mellitus  -     Hemoglobin A1C; Future; Expected date: 08/01/2024    12. Screening, lipid  -     Lipid Panel; Future; Expected date: 08/01/2024    13. Abnormal urine odor  -     Urinalysis, Reflex to Urine Culture; Future; Expected date: 08/01/2024    14. Frailty    15. 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    Chronic obstructive pulmonary disease, unspecified COPD type 04/19/2023    Dementia in other diseases classified elsewhere, unspecified severity, without behavioral disturbance,  psychotic disturbance, mood disturbance, and anxiety 04/19/2023    Anxiety 04/19/2023    Depression 04/19/2023    Vitamin B12 deficiency 04/19/2023    Incontinence 02/11/2022    Hypothyroidism 09/30/2021    OAB (overactive bladder) 04/20/2021    Moderate protein-calorie malnutrition 04/19/2023    Rheumatoid arthritis involving multiple sites, unspecified whether rheumatoid factor present 04/19/2023    Hypokalemia 04/19/2023        Health Maintenance:  Health Maintenance   Topic Date Due    TETANUS VACCINE  Never done    DEXA Scan  Never done    Shingles Vaccine (1 of 2) Never done    Lipid Panel  01/10/2025       RTC in 6 months for chronic follow up.  RTC sooner if needed.   Patient voiced understanding and is agreeable to plan.      Rosa Miller MD    Family Medicine

## 2024-08-06 RX ORDER — CIPROFLOXACIN 500 MG/1
500 TABLET ORAL 2 TIMES DAILY
Qty: 20 TABLET | Refills: 0 | Status: SHIPPED | OUTPATIENT
Start: 2024-08-06

## 2024-08-12 ENCOUNTER — OFFICE VISIT (OUTPATIENT)
Dept: FAMILY MEDICINE | Facility: CLINIC | Age: 84
End: 2024-08-12
Payer: MEDICARE

## 2024-08-12 VITALS
OXYGEN SATURATION: 96 % | TEMPERATURE: 98 F | WEIGHT: 86 LBS | SYSTOLIC BLOOD PRESSURE: 110 MMHG | HEART RATE: 70 BPM | RESPIRATION RATE: 18 BRPM | DIASTOLIC BLOOD PRESSURE: 70 MMHG | HEIGHT: 65 IN | BODY MASS INDEX: 14.33 KG/M2

## 2024-08-12 DIAGNOSIS — Z20.822 EXPOSURE TO COVID-19 VIRUS: Primary | ICD-10-CM

## 2024-08-12 LAB
CTP QC/QA: YES
SARS-COV-2 RDRP RESP QL NAA+PROBE: NEGATIVE

## 2024-08-12 PROCEDURE — 87502 INFLUENZA DNA AMP PROBE: CPT | Mod: RHCUB | Performed by: NURSE PRACTITIONER

## 2024-08-12 PROCEDURE — 87635 SARS-COV-2 COVID-19 AMP PRB: CPT | Mod: RHCUB | Performed by: NURSE PRACTITIONER

## 2024-08-12 PROCEDURE — 99212 OFFICE O/P EST SF 10 MIN: CPT | Mod: ,,, | Performed by: NURSE PRACTITIONER

## 2024-08-13 LAB
CTP QC/QA: YES
POC MOLECULAR INFLUENZA A AGN: NEGATIVE
POC MOLECULAR INFLUENZA B AGN: NEGATIVE

## 2024-08-13 NOTE — PROGRESS NOTES
Clinic Note    Rosa Alejo is a 84 y.o. female     Chief Complaint:   Chief Complaint   Patient presents with    Health Maintenance     TETANUS VACCINE Never done  DEXA Scan Never done  Shingles Vaccine(1 of 2) Never done  RSV Vaccine (Age 60+ and Pregnant patients)(1 - 1-dose 60+ series) Never done  Pneumococcal Vaccines (Age 65+)(3 of 3 - PPSV23 or PCV20) due on 10/15/2020  COVID-19 Vaccine(4 - 2023-24 season) due on 09/01/2023     Follow-up     Covid exposure with body aches        Subjective:    Patient comes in today with sitter. Sitter states patient was exposed to covid about 5 days. Requesting covid test. Denies symptoms.     Follow-up  Pertinent negatives include no abdominal pain, chest pain, congestion, coughing, fever or sore throat.        Allergies:   Review of patient's allergies indicates:   Allergen Reactions    Sulfa (sulfonamide antibiotics) Rash     Other reaction(s): Rash, Itchy        Past Medical History:  Past Medical History:   Diagnosis Date    Dementia     Depression     Hypertension     Hypotension     Hypothyroid     OAB (overactive bladder)     chronic    Parkinson's disease         Current Medications:    Current Outpatient Medications:     aspirin 325 MG tablet, Take 325 mg by mouth once daily., Disp: , Rfl:     calcium carbonate-vitamin D3 (CALTRATE 600 PLUS D) 600 mg (1,500 mg)-800 unit Chew, Take by mouth Daily., Disp: , Rfl:     carbidopa-levodopa  mg (SINEMET)  mg per tablet, Take 2 tablets by mouth 4 (four) times daily., Disp: 720 tablet, Rfl: 3    cetirizine (ZYRTEC) 10 MG tablet, Take 1 tablet (10 mg total) by mouth once daily., Disp: 90 tablet, Rfl: 1    cholecalciferol, vitamin D3, (VITAMIN D3) 25 mcg (1,000 unit) capsule, Take 1,000 Units by mouth once daily., Disp: , Rfl:     ciprofloxacin HCl (CIPRO) 500 MG tablet, Take 1 tablet (500 mg total) by mouth 2 (two) times daily., Disp: 20 tablet, Rfl: 0    cyanocobalamin 1,000 mcg/mL injection, Every 2 weeks X 3  months, then once monthly X 6 months., Disp: 6 mL, Rfl: 2    diphenoxylate-atropine 2.5-0.025 mg (LOMOTIL) 2.5-0.025 mg per tablet, Take 1 tablet by mouth 4 (four) times daily as needed for Diarrhea., Disp: 20 tablet, Rfl: 0    donepeziL (ARICEPT) 10 MG tablet, Take 10 mg by mouth once daily., Disp: , Rfl:     indomethacin (INDOCIN) 25 MG capsule, Take by mouth., Disp: , Rfl:     levothyroxine (SYNTHROID) 100 MCG tablet, Take 1 tablet (100 mcg total) by mouth before breakfast., Disp: 90 tablet, Rfl: 1    linaCLOtide (LINZESS) 145 mcg Cap capsule, Take 1 capsule (145 mcg total) by mouth Daily., Disp: 90 capsule, Rfl: 1    megestroL (MEGACE) 400 mg/10 mL (40 mg/mL) Susp, Take 5 mLs (200 mg total) by mouth 3 (three) times daily with meals., Disp: 480 mL, Rfl: 6    memantine (NAMENDA) 5 MG Tab, Take 1 tablet (5 mg total) by mouth 2 (two) times daily., Disp: 180 tablet, Rfl: 3    montelukast (SINGULAIR) 10 mg tablet, Take by mouth., Disp: , Rfl:     ondansetron (ZOFRAN) 4 MG tablet, Take 4 mg by mouth every 12 (twelve) hours., Disp: , Rfl:     ondansetron (ZOFRAN-ODT) 4 MG TbDL, Take by mouth., Disp: , Rfl:     pantoprazole (PROTONIX) 40 MG tablet, Take 1 tablet (40 mg total) by mouth 2 (two) times daily., Disp: 180 tablet, Rfl: 1    pimavanserin (NUPLAZID) 34 mg Cap, Take 1 capsule by mouth once daily., Disp: , Rfl:     potassium chloride (MICRO-K) 10 MEQ CpSR, Take 2 capsules (20 mEq total) by mouth 2 (two) times daily. Take 2 tablets twice a day, Disp: 360 capsule, Rfl: 1    QUEtiapine (SEROQUEL) 25 MG Tab, TAKE 1 TABLET (25 MG TOTAL) BY MOUTH EVERY EVENING., Disp: 90 tablet, Rfl: 3    traMADoL (ULTRAM) 50 mg tablet, as needed., Disp: , Rfl:     venlafaxine (EFFEXOR-XR) 150 MG Cp24, Take 1 capsule (150 mg total) by mouth once daily., Disp: 90 capsule, Rfl: 3       Review of Systems   Constitutional:  Negative for fever.   HENT:  Negative for nasal congestion and sore throat.    Respiratory:  Negative for cough and  "shortness of breath.    Cardiovascular:  Negative for chest pain.   Gastrointestinal:  Negative for abdominal pain.          Objective:    /70 (BP Location: Right arm, Patient Position: Sitting, BP Method: Small (Manual))   Pulse 70   Temp 97.6 °F (36.4 °C) (Oral)   Resp 18   Ht 5' 5" (1.651 m)   Wt 39 kg (86 lb)   SpO2 96%   BMI 14.31 kg/m²      Physical Exam  Constitutional:       General: She is not in acute distress.     Appearance: She is ill-appearing.      Comments: Sitting in backseat of [facility]   HENT:      Nose: No congestion or rhinorrhea.   Cardiovascular:      Rate and Rhythm: Normal rate and regular rhythm.      Pulses: Normal pulses.      Heart sounds: Normal heart sounds.   Pulmonary:      Effort: Pulmonary effort is normal.      Breath sounds: Normal breath sounds.   Neurological:      Mental Status: She is alert. Mental status is at baseline.      Comments: Has dementia          Assessment and Plan:    1. Exposure to COVID-19 virus         Exposure to COVID-19 virus  -     POCT COVID-19 Rapid Screening  -     Cancel: POCT Strep A, Molecular  -     POCT Influenza A/B Molecular      Results for orders placed or performed in visit on 08/12/24   POCT COVID-19 Rapid Screening   Result Value Ref Range    POC Rapid COVID Negative Negative     Acceptable Yes      Results for orders placed or performed in visit on 08/12/24   POCT Influenza A/B Molecular   Result Value Ref Range    POC Molecular Influenza A Ag Negative Negative    POC Molecular Influenza B Ag Negative Negative     Acceptable Yes        There are no Patient Instructions on file for this visit.   Follow up if symptoms worsen or fail to improve.     "

## 2024-08-26 ENCOUNTER — TELEPHONE (OUTPATIENT)
Dept: FAMILY MEDICINE | Facility: CLINIC | Age: 84
End: 2024-08-26
Payer: MEDICARE

## 2024-08-26 NOTE — TELEPHONE ENCOUNTER
Call made to pt regarding lab results. Pt's assistant sitters answered phone. States daughter nor other family was in the home at this time. Call made to pt daughter Patti. No answer. Left message for pt daughter to call back.

## 2024-08-26 NOTE — TELEPHONE ENCOUNTER
----- Message from Roxana Miller MD sent at 8/23/2024 10:31 AM CDT -----  Please call patient's family/caregiver regarding lab results. ROBYN is positive.she does have a h/o RA on her problems list. I am happy for her to see Rheumatology for her pain but I know her getting in and out at doctor's offices is not easy for her. Her inflammatory markers are normal.  TSH is elevated. Recommend increasing dose of levothyroxine from 100 mcg to 112 mcg. Repeat TSH (lab only) in 6 weeks.   Labs, otherwise, stable. Thanks!

## 2024-08-27 ENCOUNTER — TELEPHONE (OUTPATIENT)
Dept: FAMILY MEDICINE | Facility: CLINIC | Age: 84
End: 2024-08-27
Payer: MEDICARE

## 2024-08-27 RX ORDER — LEVOTHYROXINE SODIUM 112 UG/1
112 TABLET ORAL
Qty: 90 TABLET | Refills: 1 | Status: SHIPPED | OUTPATIENT
Start: 2024-08-27

## 2024-08-27 NOTE — TELEPHONE ENCOUNTER
Call made to pt's son in regards to pt's lab results. No answer. Left message for son to call back. Unable to give lab results to pt's sitter/caregiver due to her not on EC list.

## 2024-08-27 NOTE — TELEPHONE ENCOUNTER
----- Message from Sylvia Haynes sent at 8/26/2024 11:37 AM CDT -----  Regarding: CALL BACK  WHIT HICKS THE PT'S CARE GIVER WOULD LIKE A CALL BACK -[phone] WITH THE PT'S LAB RESULTS.

## 2024-08-27 NOTE — TELEPHONE ENCOUNTER
Informed pt daughter and pt caregiver brenda of pt lab results. Voiced understanding. Daughter states she is seeing her mother tomorrow and will talk with her about referral to Rheumatology and will give this clinic a call back with decision.

## 2024-09-11 DIAGNOSIS — E87.6 HYPOKALEMIA: Chronic | ICD-10-CM

## 2024-09-11 RX ORDER — POTASSIUM CHLORIDE 750 MG/1
20 CAPSULE, EXTENDED RELEASE ORAL 2 TIMES DAILY
Qty: 360 CAPSULE | Refills: 1 | Status: SHIPPED | OUTPATIENT
Start: 2024-09-11

## 2024-09-13 DIAGNOSIS — F32.89 OTHER DEPRESSION: Chronic | ICD-10-CM

## 2024-09-13 DIAGNOSIS — F41.9 ANXIETY: Chronic | ICD-10-CM

## 2024-09-16 RX ORDER — QUETIAPINE FUMARATE 25 MG/1
25 TABLET, FILM COATED ORAL NIGHTLY
Qty: 90 TABLET | Refills: 3 | Status: SHIPPED | OUTPATIENT
Start: 2024-09-16

## 2024-09-16 RX ORDER — VENLAFAXINE HYDROCHLORIDE 150 MG/1
150 CAPSULE, EXTENDED RELEASE ORAL DAILY
Qty: 90 CAPSULE | Refills: 3 | Status: SHIPPED | OUTPATIENT
Start: 2024-09-16

## 2024-09-16 RX ORDER — MEMANTINE HYDROCHLORIDE 5 MG/1
5 TABLET ORAL 2 TIMES DAILY
Qty: 180 TABLET | Refills: 3 | Status: SHIPPED | OUTPATIENT
Start: 2024-09-16

## 2024-10-09 ENCOUNTER — OFFICE VISIT (OUTPATIENT)
Dept: FAMILY MEDICINE | Facility: CLINIC | Age: 84
End: 2024-10-09
Payer: MEDICARE

## 2024-10-09 VITALS — OXYGEN SATURATION: 95 % | HEART RATE: 68 BPM | RESPIRATION RATE: 20 BRPM

## 2024-10-09 DIAGNOSIS — K59.09 CHRONIC CONSTIPATION: ICD-10-CM

## 2024-10-09 DIAGNOSIS — N39.0 ACUTE UTI: Primary | ICD-10-CM

## 2024-10-09 DIAGNOSIS — R05.9 COUGH, UNSPECIFIED TYPE: ICD-10-CM

## 2024-10-09 DIAGNOSIS — E03.9 HYPOTHYROIDISM, UNSPECIFIED TYPE: ICD-10-CM

## 2024-10-09 DIAGNOSIS — E53.8 VITAMIN B12 DEFICIENCY: Chronic | ICD-10-CM

## 2024-10-09 DIAGNOSIS — F02.80 DEMENTIA IN OTHER DISEASES CLASSIFIED ELSEWHERE, UNSPECIFIED SEVERITY, WITHOUT BEHAVIORAL DISTURBANCE, PSYCHOTIC DISTURBANCE, MOOD DISTURBANCE, AND ANXIETY: Chronic | ICD-10-CM

## 2024-10-09 DIAGNOSIS — E87.6 HYPOKALEMIA: ICD-10-CM

## 2024-10-09 LAB
CTP QC/QA: YES
CTP QC/QA: YES
POC MOLECULAR INFLUENZA A AGN: NEGATIVE
POC MOLECULAR INFLUENZA B AGN: NEGATIVE
SARS-COV-2 RDRP RESP QL NAA+PROBE: NEGATIVE

## 2024-10-09 PROCEDURE — 87502 INFLUENZA DNA AMP PROBE: CPT | Mod: RHCUB | Performed by: FAMILY MEDICINE

## 2024-10-09 PROCEDURE — 99214 OFFICE O/P EST MOD 30 MIN: CPT | Mod: ,,, | Performed by: FAMILY MEDICINE

## 2024-10-09 PROCEDURE — 84443 ASSAY THYROID STIM HORMONE: CPT | Mod: ,,, | Performed by: CLINICAL MEDICAL LABORATORY

## 2024-10-09 PROCEDURE — 85025 COMPLETE CBC W/AUTO DIFF WBC: CPT | Mod: ,,, | Performed by: CLINICAL MEDICAL LABORATORY

## 2024-10-09 PROCEDURE — 80053 COMPREHEN METABOLIC PANEL: CPT | Mod: ,,, | Performed by: CLINICAL MEDICAL LABORATORY

## 2024-10-09 PROCEDURE — 87635 SARS-COV-2 COVID-19 AMP PRB: CPT | Mod: RHCUB | Performed by: FAMILY MEDICINE

## 2024-10-09 RX ORDER — NITROFURANTOIN 25; 75 MG/1; MG/1
100 CAPSULE ORAL 2 TIMES DAILY
Qty: 20 CAPSULE | Refills: 0 | Status: SHIPPED | OUTPATIENT
Start: 2024-10-09 | End: 2024-10-19

## 2024-10-09 NOTE — PROGRESS NOTES
"Clinic Note    Patient Name: Rosa Alejo  : 1940  MRN: [id number]    Chief Complaint   Patient presents with    Follow-up    Cough    Anorexia     Family member states pt has not been eating and drinking as well as she has been.     Fatigue     States lack of energy, the " will to do things." Possible UTI    Anxiety       HPI:    Ms. Rosa Alejo is a 84 y.o. female who presents to clinic today with CC of concern for UTI. Reports patient has had a decreased appetite and fatigue. Sitter/caregiver accompanies patient to this visit. She reports she acts like this when she has a UTI. Patient has a h/o Parkinson's Disease and dementia. Sitter reports patient is, otherwise, at her baseline. She reports occasional cough but states daughter said this was sort of her baseline. Denies congestion or SOB.   Patient denies fever.  Patient is, otherwise, without complaints.     Medications:  Medication List with Changes/Refills   New Medications    NITROFURANTOIN, MACROCRYSTAL-MONOHYDRATE, (MACROBID) 100 MG CAPSULE    Take 1 capsule (100 mg total) by mouth 2 (two) times daily. for 10 days   Current Medications    ASPIRIN 325 MG TABLET    Take 325 mg by mouth once daily.    CALCIUM CARBONATE-VITAMIN D3 (CALTRATE 600 PLUS D) 600 MG (1,500 MG)-800 UNIT CHEW    Take by mouth Daily.    CARBIDOPA-LEVODOPA  MG (SINEMET)  MG PER TABLET    Take 2 tablets by mouth 4 (four) times daily.    CETIRIZINE (ZYRTEC) 10 MG TABLET    Take 1 tablet (10 mg total) by mouth once daily.    CHOLECALCIFEROL, VITAMIN D3, (VITAMIN D3) 25 MCG (1,000 UNIT) CAPSULE    Take 1,000 Units by mouth once daily.    CYANOCOBALAMIN 1,000 MCG/ML INJECTION    Every 2 weeks X 3 months, then once monthly X 6 months.    DIPHENOXYLATE-ATROPINE 2.5-0.025 MG (LOMOTIL) 2.5-0.025 MG PER TABLET    Take 1 tablet by mouth 4 (four) times daily as needed for Diarrhea.    DONEPEZIL (ARICEPT) 10 MG TABLET    Take 10 mg by mouth once daily.    INDOMETHACIN " (INDOCIN) 25 MG CAPSULE    Take by mouth.    LEVOTHYROXINE (SYNTHROID) 112 MCG TABLET    Take 1 tablet (112 mcg total) by mouth before breakfast.    MEGESTROL (MEGACE) 400 MG/10 ML (40 MG/ML) SUSP    Take 5 mLs (200 mg total) by mouth 3 (three) times daily with meals.    MEMANTINE (NAMENDA) 5 MG TAB    Take 1 tablet (5 mg total) by mouth 2 (two) times daily.    MONTELUKAST (SINGULAIR) 10 MG TABLET    Take by mouth.    ONDANSETRON (ZOFRAN) 4 MG TABLET    Take 4 mg by mouth every 12 (twelve) hours.    ONDANSETRON (ZOFRAN-ODT) 4 MG TBDL    Take by mouth.    PANTOPRAZOLE (PROTONIX) 40 MG TABLET    Take 1 tablet (40 mg total) by mouth 2 (two) times daily.    PIMAVANSERIN (NUPLAZID) 34 MG CAP    Take 1 capsule by mouth once daily.    POTASSIUM CHLORIDE (MICRO-K) 10 MEQ CPSR    Take 2 capsules (20 mEq total) by mouth 2 (two) times daily. Take 2 tablets twice a day    QUETIAPINE (SEROQUEL) 25 MG TAB    Take 1 tablet (25 mg total) by mouth every evening.    TRAMADOL (ULTRAM) 50 MG TABLET    as needed.    VENLAFAXINE (EFFEXOR-XR) 150 MG CP24    Take 1 capsule (150 mg total) by mouth once daily.   Changed and/or Refilled Medications    Modified Medication Previous Medication    LINACLOTIDE (LINZESS) 145 MCG CAP CAPSULE linaCLOtide (LINZESS) 145 mcg Cap capsule       Take 1 capsule (145 mcg total) by mouth Daily.    Take 1 capsule (145 mcg total) by mouth Daily.   Discontinued Medications    CIPROFLOXACIN HCL (CIPRO) 500 MG TABLET    Take 1 tablet (500 mg total) by mouth 2 (two) times daily.        Allergies: Sulfa (sulfonamide antibiotics)      Past Medical History:    Past Medical History:   Diagnosis Date    Dementia     Depression     Hypertension     Hypotension     Hypothyroid     OAB (overactive bladder)     chronic    Parkinson's disease        Past Surgical History:    Past Surgical History:   Procedure Laterality Date    CHOLECYSTECTOMY      ESOPHAGEAL DILATION      HIP SURGERY      Interstim electrode test insertion  with x-ray imaging   08/10/2020    at [facility];  test implant at S3 without difficulty;  2.0 mA was the testing and point;  no complications    KNEE SURGERY      LUMPECTOMY,BREAST, WITH RADIOACTIVE SEED LOCALIZATION AND SENTINEL LYMPH NODE BIOPSY      REMOVAL OF SACRAL NEUROSTIMULATOR DEVICE Left 11/22/2021    Procedure: REMOVAL, NEUROSTIMULATOR, SACRAL;  Surgeon: Reza Murphy MD;  Location: [facility] OR;  Service: Pain Management;  Laterality: Left;    SINUS SURGERY      TOTAL ABDOMINAL HYSTERECTOMY W/ BILATERAL SALPINGOOPHORECTOMY           Social History:    Social History     Tobacco Use   Smoking Status Never    Passive exposure: Past   Smokeless Tobacco Never     Social History     Substance and Sexual Activity   Alcohol Use Not Currently     Social History     Substance and Sexual Activity   Drug Use Never         Family History:    Family History   Problem Relation Name Age of Onset    Heart disease Father      Heart disease Mother         Review of Systems:    Review of Systems   Constitutional:  Negative for appetite change, chills, fatigue, fever and unexpected weight change.   Eyes:  Negative for visual disturbance.   Respiratory:  Negative for cough and shortness of breath.    Cardiovascular:  Negative for chest pain and leg swelling.   Gastrointestinal:  Negative for abdominal pain, change in bowel habit, constipation, diarrhea, nausea and vomiting.   Genitourinary:         Dark urine with odor to urine   Musculoskeletal:  Negative for arthralgias.   Integumentary:  Negative for rash.   Neurological:  Negative for dizziness and headaches.   Psychiatric/Behavioral:  The patient is not nervous/anxious.         Vitals:    Vitals:    10/09/24 1428 10/09/24 1429 10/09/24 1430 10/09/24 1438   Pulse: (!) 48 78 78 68   Resp: 20 20 20    SpO2: (!) 87% (!) 89%  Comment: 3L o2 nasal cannula (!) 92%  Comment: 3L O2 nasal cannula 95%  Comment: room air       There is no height  or weight on file to calculate BMI.    Wt Readings from Last 3 Encounters:   08/12/24 0828 39 kg (86 lb)   08/01/24 1324 39 kg (86 lb)   05/15/24 1336 39.4 kg (86 lb 12.8 oz)        Physical Exam:    Physical Exam  Constitutional:       General: She is not in acute distress.     Comments: Appears chronically ill but stable and in no acute distress   HENT:      Nose: Nose normal.      Mouth/Throat:      Mouth: Mucous membranes are moist.      Pharynx: Oropharynx is clear.   Eyes:      Conjunctiva/sclera: Conjunctivae normal.   Cardiovascular:      Rate and Rhythm: Normal rate and regular rhythm.      Heart sounds: Normal heart sounds. No murmur heard.  Pulmonary:      Effort: Pulmonary effort is normal. No respiratory distress.      Breath sounds: Normal breath sounds. No wheezing, rhonchi or rales.   Abdominal:      General: Bowel sounds are normal.      Palpations: Abdomen is soft.      Tenderness: There is no abdominal tenderness. There is no right CVA tenderness, left CVA tenderness, guarding or rebound.   Musculoskeletal:      Cervical back: Neck supple.      Right lower leg: No edema.      Left lower leg: No edema.   Skin:     Findings: No rash.   Neurological:      General: No focal deficit present.      Mental Status: She is alert. Mental status is at baseline.   Psychiatric:         Mood and Affect: Mood normal.     Results:  Flu: negative  COVID-19: negative    Assessment/Plan:   1. Acute UTI  -     Ambulatory referral/consult to Home Health; Future; Expected date: 10/10/2024  -     nitrofurantoin, macrocrystal-monohydrate, (MACROBID) 100 MG capsule; Take 1 capsule (100 mg total) by mouth 2 (two) times daily. for 10 days  Dispense: 20 capsule; Refill: 0    2. Chronic constipation  -     linaCLOtide (LINZESS) 145 mcg Cap capsule; Take 1 capsule (145 mcg total) by mouth Daily.  Dispense: 90 capsule; Refill: 1    3. Cough, unspecified type  -     POCT COVID-19 Rapid Screening  -     POCT Influenza A/B  Molecular    4. Hypothyroidism, unspecified type  -     CBC Auto Differential; Future; Expected date: 10/09/2024  -     TSH; Future; Expected date: 10/09/2024    5. Hypokalemia  -     CBC Auto Differential; Future; Expected date: 10/09/2024  -     Comprehensive Metabolic Panel; Future; Expected date: 10/09/2024    6. Vitamin B12 deficiency    7. Dementia in other diseases classified elsewhere, unspecified severity, without behavioral disturbance, psychotic disturbance, mood disturbance, and anxiety    Of note, patient appears at her baseline. Sitter agrees. Oxygen and HR low initially but felt to be a false reading because patient was fidgeting an her hands were cold. Once she settled down and her hands warmed up her oxygen and HR returned to normal without any supplementation oxygen. Discussed ER for further evaluation. Home health consulted to assist with urine culture. Started on antibiotics based on prior urine culture results. Home health and sitter to monitor closely. Patient to go to ER if she worsens or fails to improve. Voiced understanding.    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    Chronic obstructive pulmonary disease, unspecified COPD type 04/19/2023    Dementia in other diseases classified elsewhere, unspecified severity, without behavioral disturbance, psychotic disturbance, mood disturbance, and anxiety 04/19/2023    Anxiety 04/19/2023    Depression 04/19/2023    Vitamin B12 deficiency 04/19/2023    Incontinence 02/11/2022    Hypothyroidism 09/30/2021    OAB (overactive bladder) 04/20/2021    Moderate protein-calorie malnutrition 04/19/2023    Rheumatoid arthritis involving multiple sites, unspecified whether rheumatoid factor present 04/19/2023    Hypokalemia 04/19/2023        RTC as scheduled for chronic follow up. RTC sooner if needed.  Patient voiced understanding and is agreeable to plan.      Rosa Miller,  MD    Family Medicine

## 2024-10-10 LAB
ALBUMIN SERPL BCP-MCNC: 3.8 G/DL (ref 3.5–5)
ALBUMIN/GLOB SERPL: 1.1 {RATIO}
ALP SERPL-CCNC: 75 U/L (ref 55–142)
ALT SERPL W P-5'-P-CCNC: 15 U/L (ref 13–56)
ANION GAP SERPL CALCULATED.3IONS-SCNC: 11 MMOL/L (ref 7–16)
AST SERPL W P-5'-P-CCNC: 27 U/L (ref 15–37)
BASOPHILS # BLD AUTO: 0.03 K/UL (ref 0–0.2)
BASOPHILS NFR BLD AUTO: 0.4 % (ref 0–1)
BILIRUB SERPL-MCNC: 0.6 MG/DL (ref ?–1.2)
BUN SERPL-MCNC: 34 MG/DL (ref 7–18)
BUN/CREAT SERPL: 28 (ref 6–20)
CALCIUM SERPL-MCNC: 8.6 MG/DL (ref 8.5–10.1)
CHLORIDE SERPL-SCNC: 115 MMOL/L (ref 98–107)
CO2 SERPL-SCNC: 22 MMOL/L (ref 21–32)
CREAT SERPL-MCNC: 1.21 MG/DL (ref 0.55–1.02)
DIFFERENTIAL METHOD BLD: ABNORMAL
EGFR (NO RACE VARIABLE) (RUSH/TITUS): 44 ML/MIN/1.73M2
EOSINOPHIL # BLD AUTO: 0.05 K/UL (ref 0–0.5)
EOSINOPHIL NFR BLD AUTO: 0.7 % (ref 1–4)
ERYTHROCYTE [DISTWIDTH] IN BLOOD BY AUTOMATED COUNT: 14.4 % (ref 11.5–14.5)
GLOBULIN SER-MCNC: 3.6 G/DL (ref 2–4)
GLUCOSE SERPL-MCNC: 127 MG/DL (ref 74–106)
HCT VFR BLD AUTO: 40.4 % (ref 38–47)
HGB BLD-MCNC: 12.9 G/DL (ref 12–16)
IMM GRANULOCYTES # BLD AUTO: 0.06 K/UL (ref 0–0.04)
IMM GRANULOCYTES NFR BLD: 0.8 % (ref 0–0.4)
LYMPHOCYTES # BLD AUTO: 1.44 K/UL (ref 1–4.8)
LYMPHOCYTES NFR BLD AUTO: 18.9 % (ref 27–41)
MCH RBC QN AUTO: 33.4 PG (ref 27–31)
MCHC RBC AUTO-ENTMCNC: 31.9 G/DL (ref 32–36)
MCV RBC AUTO: 104.7 FL (ref 80–96)
MONOCYTES # BLD AUTO: 0.27 K/UL (ref 0–0.8)
MONOCYTES NFR BLD AUTO: 3.5 % (ref 2–6)
MPC BLD CALC-MCNC: 9.9 FL (ref 9.4–12.4)
NEUTROPHILS # BLD AUTO: 5.78 K/UL (ref 1.8–7.7)
NEUTROPHILS NFR BLD AUTO: 75.7 % (ref 53–65)
NRBC # BLD AUTO: 0 X10E3/UL
NRBC, AUTO (.00): 0 %
PLATELET # BLD AUTO: 542 K/UL (ref 150–400)
POTASSIUM SERPL-SCNC: 4.2 MMOL/L (ref 3.5–5.1)
PROT SERPL-MCNC: 7.4 G/DL (ref 6.4–8.2)
RBC # BLD AUTO: 3.86 M/UL (ref 4.2–5.4)
SODIUM SERPL-SCNC: 144 MMOL/L (ref 136–145)
TSH SERPL DL<=0.005 MIU/L-ACNC: 14.6 UIU/ML (ref 0.36–3.74)
WBC # BLD AUTO: 7.63 K/UL (ref 4.5–11)

## 2024-10-22 DIAGNOSIS — E03.9 HYPOTHYROIDISM, UNSPECIFIED TYPE: Primary | Chronic | ICD-10-CM

## 2024-10-22 RX ORDER — LEVOTHYROXINE SODIUM 125 UG/1
125 TABLET ORAL
Qty: 90 TABLET | Refills: 1 | Status: SHIPPED | OUTPATIENT
Start: 2024-10-22

## 2024-10-22 NOTE — TELEPHONE ENCOUNTER
Call made to pt/pt caregiver Tamar. She verified pt's . Informed of lab results. Voiced understanding. States pt is feeling a lot better since las visit. Verified [facility] as [facility]. Call made to pt home health. Informed of lab repeat and dosage increase of levothyroxine.

## 2024-10-22 NOTE — TELEPHONE ENCOUNTER
----- Message from Roxana Miller MD sent at 10/18/2024  9:46 AM CDT -----  Please call patient/family regarding lab results. Patient has CKD. Avoid NSAIDs and drink an adequate amount of water.   TSH is elevated. If she has not missed any doses of her thyroid medication and is taking as prescribed, recommend increasing levothyroxine to 125 mcg PO daily. Repeat TSH (Lab only) in 6 weeks.   Labs, otherwise, ok/stable. Thanks!

## 2024-10-31 RX ORDER — PANTOPRAZOLE SODIUM 40 MG/1
40 TABLET, DELAYED RELEASE ORAL 2 TIMES DAILY
Qty: 180 TABLET | Refills: 0 | Status: SHIPPED | OUTPATIENT
Start: 2024-10-31

## 2024-11-05 ENCOUNTER — EXTERNAL HOME HEALTH (OUTPATIENT)
Dept: HOME HEALTH SERVICES | Facility: HOSPITAL | Age: 84
End: 2024-11-05
Payer: MEDICARE

## 2024-11-07 ENCOUNTER — TELEPHONE (OUTPATIENT)
Dept: FAMILY MEDICINE | Facility: CLINIC | Age: 84
End: 2024-11-07
Payer: MEDICARE

## 2024-11-07 ENCOUNTER — HOSPITAL ENCOUNTER (EMERGENCY)
Facility: HOSPITAL | Age: 84
Discharge: HOME | End: 2024-11-07
Payer: MEDICARE

## 2024-11-07 VITALS
BODY MASS INDEX: 13.66 KG/M2 | RESPIRATION RATE: 15 BRPM | DIASTOLIC BLOOD PRESSURE: 87 MMHG | HEIGHT: 66 IN | WEIGHT: 85 LBS | SYSTOLIC BLOOD PRESSURE: 151 MMHG | HEART RATE: 89 BPM | TEMPERATURE: 99 F | OXYGEN SATURATION: 99 %

## 2024-11-07 DIAGNOSIS — E86.0 DEHYDRATION: ICD-10-CM

## 2024-11-07 DIAGNOSIS — R41.82 ALTERED MENTAL STATUS, UNSPECIFIED ALTERED MENTAL STATUS TYPE: Primary | ICD-10-CM

## 2024-11-07 LAB
ALBUMIN SERPL BCP-MCNC: 3.3 G/DL (ref 3.5–5)
ALBUMIN/GLOB SERPL: 0.9 {RATIO}
ALP SERPL-CCNC: 77 U/L (ref 55–142)
ALT SERPL W P-5'-P-CCNC: 16 U/L (ref 13–56)
AMORPH PHOS CRY #/AREA URNS LPF: ABNORMAL /LPF
ANION GAP SERPL CALCULATED.3IONS-SCNC: 19 MMOL/L (ref 7–16)
AST SERPL W P-5'-P-CCNC: 36 U/L (ref 15–37)
BACTERIA #/AREA URNS HPF: ABNORMAL /HPF
BASOPHILS # BLD AUTO: 0.03 K/UL (ref 0–0.2)
BASOPHILS NFR BLD AUTO: 0.3 % (ref 0–1)
BILIRUB SERPL-MCNC: 0.6 MG/DL (ref ?–1.2)
BILIRUB UR QL STRIP: NEGATIVE
BUN SERPL-MCNC: 59 MG/DL (ref 7–18)
BUN/CREAT SERPL: 37 (ref 6–20)
CALCIUM SERPL-MCNC: 8.9 MG/DL (ref 8.5–10.1)
CHLORIDE SERPL-SCNC: 115 MMOL/L (ref 98–107)
CLARITY UR: ABNORMAL
CO2 SERPL-SCNC: 20 MMOL/L (ref 21–32)
COLOR UR: YELLOW
CREAT SERPL-MCNC: 1.59 MG/DL (ref 0.55–1.02)
DIFFERENTIAL METHOD BLD: ABNORMAL
EGFR (NO RACE VARIABLE) (RUSH/TITUS): 32 ML/MIN/1.73M2
EOSINOPHIL # BLD AUTO: 0.04 K/UL (ref 0–0.5)
EOSINOPHIL NFR BLD AUTO: 0.4 % (ref 1–4)
ERYTHROCYTE [DISTWIDTH] IN BLOOD BY AUTOMATED COUNT: 14.6 % (ref 11.5–14.5)
GLOBULIN SER-MCNC: 3.5 G/DL (ref 2–4)
GLUCOSE SERPL-MCNC: 202 MG/DL (ref 74–106)
GLUCOSE UR STRIP-MCNC: NEGATIVE MG/DL
HCT VFR BLD AUTO: 39.9 % (ref 38–47)
HGB BLD-MCNC: 13 G/DL (ref 12–16)
KETONES UR STRIP-SCNC: ABNORMAL MG/DL
LEUKOCYTE ESTERASE UR QL STRIP: NEGATIVE
LYMPHOCYTES # BLD AUTO: 1.49 K/UL (ref 1–4.8)
LYMPHOCYTES NFR BLD AUTO: 14.5 % (ref 27–41)
MCH RBC QN AUTO: 34.7 PG (ref 27–31)
MCHC RBC AUTO-ENTMCNC: 32.6 G/DL (ref 32–36)
MCV RBC AUTO: 106.4 FL (ref 80–96)
MONOCYTES # BLD AUTO: 0.31 K/UL (ref 0–0.8)
MONOCYTES NFR BLD AUTO: 3 % (ref 2–6)
MPC BLD CALC-MCNC: 9.4 FL (ref 9.4–12.4)
NEUTROPHILS # BLD AUTO: 8.41 K/UL (ref 1.8–7.7)
NEUTROPHILS NFR BLD AUTO: 81.8 % (ref 53–65)
NITRITE UR QL STRIP: NEGATIVE
PH UR STRIP: 5.5 PH UNITS
PLATELET # BLD AUTO: 540 K/UL (ref 150–400)
POTASSIUM SERPL-SCNC: 4.9 MMOL/L (ref 3.5–5.1)
PROT SERPL-MCNC: 6.8 G/DL (ref 6.4–8.2)
PROT UR QL STRIP: 30
RBC # BLD AUTO: 3.75 M/UL (ref 4.2–5.4)
RBC # UR STRIP: NEGATIVE /UL
RBC #/AREA URNS HPF: ABNORMAL /HPF
SODIUM SERPL-SCNC: 149 MMOL/L (ref 136–145)
SP GR UR STRIP: 1.02
SQUAMOUS #/AREA URNS LPF: ABNORMAL /LPF
UROBILINOGEN UR STRIP-ACNC: 0.2 MG/DL
WBC # BLD AUTO: 10.28 K/UL (ref 4.5–11)
WBC #/AREA URNS HPF: ABNORMAL /HPF

## 2024-11-07 PROCEDURE — 63600175 PHARM REV CODE 636 W HCPCS: Performed by: PHYSICIAN ASSISTANT

## 2024-11-07 PROCEDURE — 96360 HYDRATION IV INFUSION INIT: CPT

## 2024-11-07 PROCEDURE — 80053 COMPREHEN METABOLIC PANEL: CPT | Performed by: PHYSICIAN ASSISTANT

## 2024-11-07 PROCEDURE — 99285 EMERGENCY DEPT VISIT HI MDM: CPT | Mod: GF | Performed by: PHYSICIAN ASSISTANT

## 2024-11-07 PROCEDURE — 85025 COMPLETE CBC W/AUTO DIFF WBC: CPT | Performed by: PHYSICIAN ASSISTANT

## 2024-11-07 PROCEDURE — 99284 EMERGENCY DEPT VISIT MOD MDM: CPT | Mod: 25

## 2024-11-07 PROCEDURE — 81003 URINALYSIS AUTO W/O SCOPE: CPT | Performed by: PHYSICIAN ASSISTANT

## 2024-11-07 RX ADMIN — SODIUM CHLORIDE, POTASSIUM CHLORIDE, SODIUM LACTATE AND CALCIUM CHLORIDE 1000 ML: 600; 310; 30; 20 INJECTION, SOLUTION INTRAVENOUS at 02:11

## 2024-11-07 NOTE — TELEPHONE ENCOUNTER
----- Message from Zoraida sent at 11/6/2024  3:48 PM CST -----  Regarding: UTI  This pt's family member called and said that the pt was seen recently for a UTI but she believes she has one again. The nurse that goes out to her home hasn't gotten a urine or anything so they are wondering if she would need to give one or something to get more medicine for it. Thank you.

## 2024-11-07 NOTE — TELEPHONE ENCOUNTER
----- Message from Zoraida sent at 11/7/2024  8:24 AM CST -----  Regarding: FW: UTI  This pt said she got a call this morning and was returning the call. I told her stay by her phone and I would send a message to let y'all know. Thank you.  ----- Message -----  From: Zoraida Luong  Sent: 11/6/2024   3:49 PM CST  To: Ericka [facility] Staff  Subject: UTI                                              This pt's family member called and said that the pt was seen recently for a UTI but she believes she has one again. The nurse that goes out to her home hasn't gotten a urine or anything so they are wondering if she would need to give one or something to get more medicine for it. Thank you.

## 2024-11-07 NOTE — ED TRIAGE NOTES
Pt presents due to AMS and increasing weakness. Home health nurse at bedside states that over a week pt has had increasing weakness. States pt is usually able to communicate some and has a good appetite but pt has had increasing confusing and refusal to speak as well as decreased appetite. Hx of parkinson's. Saw PCP last week and prescribed abx for uti. Received call back that pt did not have uti and abx were stopped. Increasing AMS onset after abx stopped.

## 2024-11-07 NOTE — ED PROVIDER NOTES
Encounter Date: 11/7/2024       History     Chief Complaint   Patient presents with    Altered Mental Status    Weakness     Patient is an 84-year-old female with history of Alzheimer's, Parkinson's.  She is nonverbal, extremely malnourished.  She was seen by her primary care provider and thought to possibly have a UTI, was given antibiotics.    Her caregiver states that she is not acting herself, will not, seems to be moving around in discomfort, and more confused.    She also has a past medical history of hypertension, hypotension, hypothyroidism, overactive bladder, depression  Past medical history is positive for cholecystectomy, knee surgery, sinus surgery, total hysterectomy with the sapling oophorectomy.    Esophageal dilation.    Denies any tobacco alcohol or drug use per her chart      Review of patient's allergies indicates:   Allergen Reactions    Sulfa (sulfonamide antibiotics) Rash     Other reaction(s): Rash, Itchy     Past Medical History:   Diagnosis Date    Dementia     Depression     Hypertension     Hypotension     Hypothyroid     OAB (overactive bladder)     chronic    Parkinson's disease      Past Surgical History:   Procedure Laterality Date    CHOLECYSTECTOMY      ESOPHAGEAL DILATION      HIP SURGERY      Interstim electrode test insertion with x-ray imaging   08/10/2020    at [facility];  test implant at S3 without difficulty;  2.0 mA was the testing and point;  no complications    KNEE SURGERY      LUMPECTOMY,BREAST, WITH RADIOACTIVE SEED LOCALIZATION AND SENTINEL LYMPH NODE BIOPSY      REMOVAL OF SACRAL NEUROSTIMULATOR DEVICE Left 11/22/2021    Procedure: REMOVAL, NEUROSTIMULATOR, SACRAL;  Surgeon: Reza Murphy MD;  Location: [facility] [place] OR;  Service: Pain Management;  Laterality: Left;    SINUS SURGERY      TOTAL ABDOMINAL HYSTERECTOMY W/ BILATERAL SALPINGOOPHORECTOMY       Family History   Problem Relation Name Age of Onset    Heart disease Father       Heart disease Mother       Social History     Tobacco Use    Smoking status: Never     Passive exposure: Past    Smokeless tobacco: Never   Substance Use Topics    Alcohol use: Not Currently    Drug use: Never     Review of Systems    Physical Exam     Initial Vitals   BP Pulse Resp Temp SpO2   11/07/24 1317 11/07/24 1317 11/07/24 1317 11/07/24 1343 11/07/24 1317   130/86 97 13 99.2 °F (37.3 °C) 95 %      MAP       --                Physical Exam    Medical Screening Exam   See Full Note    ED Course   Procedures  Labs Reviewed   COMPREHENSIVE METABOLIC PANEL - Abnormal       Result Value    Sodium 149 (*)     Potassium 4.9      Chloride 115 (*)     CO2 20 (*)     Anion Gap 19 (*)     Glucose 202 (*)     BUN 59 (*)     Creatinine 1.59 (*)     BUN/Creatinine Ratio 37 (*)     Calcium 8.9      Total Protein 6.8      Albumin 3.3 (*)     Globulin 3.5      A/G Ratio 0.9      Bilirubin, Total 0.6      Alk Phos 77      ALT 16      AST 36      eGFR 32 (*)    URINALYSIS, REFLEX TO URINE CULTURE - Abnormal    Color, UA Yellow      Clarity, UA Slightly Cloudy      pH, UA 5.5      Leukocytes, UA Negative      Nitrites, UA Negative      Protein, UA 30 (*)     Glucose, UA Negative      Ketones, UA Trace      Urobilinogen, UA 0.2      Bilirubin, UA Negative      Blood, UA Negative      Specific Gravity, UA 1.020     CBC WITH DIFFERENTIAL - Abnormal    WBC 10.28      RBC 3.75 (*)     Hemoglobin 13.0      Hematocrit 39.9      .4 (*)     MCH 34.7 (*)     MCHC 32.6      RDW 14.6 (*)     Platelet Count 540 (*)     MPV 9.4      Neutrophils % 81.8 (*)     Lymphocytes % 14.5 (*)     Neutrophils, Abs 8.41 (*)     Lymphocytes, Absolute 1.49      Diff Type Auto      Monocytes % 3.0      Eosinophils % 0.4 (*)     Basophils % 0.3      Monocytes, Absolute 0.31      Eosinophils, Absolute 0.04      Basophils, Absolute 0.03     URINALYSIS, MICROSCOPIC - Abnormal    WBC, UA 0-5      RBC, UA 0-3      Bacteria, UA Few (*)     Squamous  Epithelial Cells, UA Few (*)     Amorphous Crystals, UA Moderate (*)    CBC W/ AUTO DIFFERENTIAL    Narrative:     The following orders were created for panel order CBC auto differential.  Procedure                               Abnormality         Status                     ---------                               -----------         ------                     CBC with Differential[[id number]]       Abnormal            Final result                 Please view results for these tests on the individual orders.          Imaging Results              CT Head Without Contrast (Final result)  Result time 11/07/24 15:00:38      Final result by Maged Grimes MD (11/07/24 15:00:38)                   Impression:      1. There is extensive motion artifact limiting evaluation.  Allowing for this, no convincing acute intracranial abnormalities noting sequela of chronic microvascular ischemic change and senescent change.      Electronically signed by: Maged Grimes MD  Date:    11/07/2024  Time:    15:00               Narrative:    EXAMINATION:  CT HEAD WITHOUT CONTRAST    CLINICAL HISTORY:  Mental status change, unknown cause;Altered mental status;    TECHNIQUE:  Low dose axial images were obtained through the head.  Coronal and sagittal reformations were also performed. Contrast was not administered.    COMPARISON:  CT 08/20/2022    FINDINGS:  There is motion artifact.    There is generalized cerebral volume loss.  There is hypoattenuation in a periventricular fashion, likely sequela of chronic microvascular ischemic change. there is no evidence of acute major vascular territory infarct, hemorrhage, or mass.  There is no hydrocephalus.  There are no abnormal extra-axial fluid collections.  The paranasal sinuses and mastoid air cells are clear, and there is no evidence of calvarial fracture.  The visualized soft tissues are unremarkable.                                       Medications   lactated ringers bolus 1,000 mL  (1,000 mLs Intravenous New Bag 11/7/24 7383)     Medical Decision Making  Patient is an 84-year-old female with history of Alzheimer's, Parkinson's.  She is nonverbal, extremely malnourished.  She was seen by her primary care provider and thought to possibly have a UTI, was given antibiotics.    Her caregiver states that she is not acting herself, will not, seems to be moving around in discomfort, and more confused.    She also has a past medical history of hypertension, hypotension, hypothyroidism, overactive bladder, depression  Past medical history is positive for cholecystectomy, knee surgery, sinus surgery, total hysterectomy with the sapling oophorectomy.    Esophageal dilation.    Denies any tobacco alcohol or drug use per her chart    Patient's workup includes CBC, CMP, urine cath, CT head.      Patient was negative for urinary tract infection, she is dehydrated.    Patient received a L of lactated Ringer's.    Caregiver instructed to encourage oral intake.  She will return to the emergency department if any new or worsening symptoms arise.    She will follow up her primary care provider as well      Amount and/or Complexity of Data Reviewed  Labs: ordered.  Radiology: ordered.                                      Clinical Impression:   Final diagnoses:  [R41.82] Altered mental status, unspecified altered mental status type (Primary)  [E86.0] Dehydration        ED Disposition Condition    Discharge Stable          ED Prescriptions    None       Follow-up Information    None          Amarjit Mcintyre PA  11/07/24 8182

## 2024-11-07 NOTE — TELEPHONE ENCOUNTER
Call made to pt. No answer. Left message for pt caretaker to call back. Spoke with Dr. Talley regarding this pt and she recommended pt be evaluated in ER r/t not being able to give urine sample, possible dehydration.the patient's home health [facility] notified as well.

## 2024-11-07 NOTE — TELEPHONE ENCOUNTER
Pt caregiver returned phone call. Informed of pt needing to go to ER to be evaluated. Voiced understanding. States she will take her there as soon as she gets to pt's house.

## 2024-11-11 ENCOUNTER — TELEPHONE (OUTPATIENT)
Dept: EMERGENCY MEDICINE | Facility: HOSPITAL | Age: 84
End: 2024-11-11
Payer: MEDICARE

## 2024-11-15 ENCOUNTER — DOCUMENT SCAN (OUTPATIENT)
Dept: HOME HEALTH SERVICES | Facility: HOSPITAL | Age: 84
End: 2024-11-15
Payer: MEDICARE

## 2024-11-20 ENCOUNTER — HOSPITAL ENCOUNTER (EMERGENCY)
Facility: HOSPITAL | Age: 84
Discharge: HOME | End: 2024-11-20
Attending: EMERGENCY MEDICINE
Payer: MEDICARE

## 2024-11-20 VITALS
RESPIRATION RATE: 22 BRPM | HEIGHT: 66 IN | TEMPERATURE: 99 F | BODY MASS INDEX: 12.05 KG/M2 | OXYGEN SATURATION: 100 % | SYSTOLIC BLOOD PRESSURE: 114 MMHG | DIASTOLIC BLOOD PRESSURE: 86 MMHG | WEIGHT: 75 LBS | HEART RATE: 91 BPM

## 2024-11-20 DIAGNOSIS — Z51.5 HOSPICE CARE: ICD-10-CM

## 2024-11-20 DIAGNOSIS — R54 ADVANCED AGE: ICD-10-CM

## 2024-11-20 DIAGNOSIS — E86.0 DEHYDRATION: Primary | ICD-10-CM

## 2024-11-20 LAB
ALBUMIN SERPL BCP-MCNC: 3.3 G/DL (ref 3.4–4.8)
ALBUMIN/GLOB SERPL: 1.1 {RATIO}
ALP SERPL-CCNC: 71 U/L (ref 40–150)
ALT SERPL W P-5'-P-CCNC: <7 U/L
ANION GAP SERPL CALCULATED.3IONS-SCNC: 18 MMOL/L (ref 7–16)
ANISOCYTOSIS BLD QL SMEAR: NORMAL
AST SERPL W P-5'-P-CCNC: 40 U/L (ref 5–34)
BACTERIA #/AREA URNS HPF: ABNORMAL /HPF
BASOPHILS # BLD AUTO: 0.02 K/UL (ref 0–0.2)
BASOPHILS NFR BLD AUTO: 0.2 % (ref 0–1)
BILIRUB SERPL-MCNC: 0.6 MG/DL
BILIRUB UR QL STRIP: NEGATIVE
BUN SERPL-MCNC: 68 MG/DL (ref 10–20)
BUN/CREAT SERPL: 33 (ref 6–20)
CALCIUM SERPL-MCNC: 9.2 MG/DL (ref 8.4–10.2)
CHLORIDE SERPL-SCNC: 125 MMOL/L (ref 98–107)
CLARITY UR: CLEAR
CO2 SERPL-SCNC: 18 MMOL/L (ref 23–31)
COLOR UR: YELLOW
CREAT SERPL-MCNC: 2.05 MG/DL (ref 0.55–1.02)
DIFFERENTIAL METHOD BLD: ABNORMAL
EGFR (NO RACE VARIABLE) (RUSH/TITUS): 24 ML/MIN/1.73M2
EOSINOPHIL # BLD AUTO: 0.04 K/UL (ref 0–0.5)
EOSINOPHIL NFR BLD AUTO: 0.4 % (ref 1–4)
ERYTHROCYTE [DISTWIDTH] IN BLOOD BY AUTOMATED COUNT: 14.9 % (ref 11.5–14.5)
GLOBULIN SER-MCNC: 3 G/DL (ref 2–4)
GLUCOSE SERPL-MCNC: 92 MG/DL (ref 82–115)
GLUCOSE UR STRIP-MCNC: NEGATIVE MG/DL
HCT VFR BLD AUTO: 39.3 % (ref 38–47)
HGB BLD-MCNC: 12.8 G/DL (ref 12–16)
HYPOCHROMIA BLD QL SMEAR: NORMAL
KETONES UR STRIP-SCNC: 15 MG/DL
LEUKOCYTE ESTERASE UR QL STRIP: NEGATIVE
LYMPHOCYTES # BLD AUTO: 1.59 K/UL (ref 1–4.8)
LYMPHOCYTES NFR BLD AUTO: 14.4 % (ref 27–41)
MACROCYTES BLD QL SMEAR: NORMAL
MCH RBC QN AUTO: 36.1 PG (ref 27–31)
MCHC RBC AUTO-ENTMCNC: 32.6 G/DL (ref 32–36)
MCV RBC AUTO: 110.7 FL (ref 80–96)
MONOCYTES # BLD AUTO: 0.25 K/UL (ref 0–0.8)
MONOCYTES NFR BLD AUTO: 2.3 % (ref 2–6)
MPC BLD CALC-MCNC: 10.6 FL (ref 9.4–12.4)
NEUTROPHILS # BLD AUTO: 9.13 K/UL (ref 1.8–7.7)
NEUTROPHILS NFR BLD AUTO: 82.7 % (ref 53–65)
NITRITE UR QL STRIP: NEGATIVE
PH UR STRIP: 5 PH UNITS
PLATELET # BLD AUTO: 306 K/UL (ref 150–400)
PLATELET MORPHOLOGY: NORMAL
POTASSIUM SERPL-SCNC: 5.8 MMOL/L (ref 3.5–5.1)
PROT SERPL-MCNC: 6.3 G/DL (ref 5.8–7.6)
PROT UR QL STRIP: 30
RBC # BLD AUTO: 3.55 M/UL (ref 4.2–5.4)
RBC # UR STRIP: NEGATIVE /UL
RBC #/AREA URNS HPF: ABNORMAL /HPF
SODIUM SERPL-SCNC: 155 MMOL/L (ref 136–145)
SP GR UR STRIP: 1.02
SQUAMOUS #/AREA URNS LPF: ABNORMAL /LPF
UROBILINOGEN UR STRIP-ACNC: 0.2 MG/DL
WBC # BLD AUTO: 11.03 K/UL (ref 4.5–11)
WBC #/AREA URNS HPF: ABNORMAL /HPF

## 2024-11-20 PROCEDURE — 99284 EMERGENCY DEPT VISIT MOD MDM: CPT | Performed by: EMERGENCY MEDICINE

## 2024-11-20 PROCEDURE — 36415 COLL VENOUS BLD VENIPUNCTURE: CPT | Performed by: EMERGENCY MEDICINE

## 2024-11-20 PROCEDURE — 99284 EMERGENCY DEPT VISIT MOD MDM: CPT | Mod: 25

## 2024-11-20 PROCEDURE — 81003 URINALYSIS AUTO W/O SCOPE: CPT | Performed by: EMERGENCY MEDICINE

## 2024-11-20 PROCEDURE — 25000003 PHARM REV CODE 250: Performed by: EMERGENCY MEDICINE

## 2024-11-20 PROCEDURE — 80053 COMPREHEN METABOLIC PANEL: CPT | Performed by: EMERGENCY MEDICINE

## 2024-11-20 PROCEDURE — 85025 COMPLETE CBC W/AUTO DIFF WBC: CPT | Performed by: EMERGENCY MEDICINE

## 2024-11-20 PROCEDURE — 96360 HYDRATION IV INFUSION INIT: CPT

## 2024-11-20 RX ADMIN — SODIUM CHLORIDE 250 ML: 0.9 INJECTION, SOLUTION INTRAVENOUS at 02:11

## 2024-11-20 NOTE — ED PROVIDER NOTES
Encounter Date: 11/20/2024       History     Chief Complaint   Patient presents with    Weakness     PT IS A 84 YR OLD  ELDERLY F WITH INCREASED GENERALIZED WEAKNESS AND ANOREXIA WITH HX SIMILAR AND PLAN TO PLACE ON HOSPICE TODAY. PT WITH SIMILAR ED PRESENTATION 2 WEEKS AGO IMPROVED TRANSIENTLY WITH IVF IN ED.  PT IS DNR  PT'S SITTER DENIES PT HAD A FALL, FEVER/CHILLS, OR N/V/D.        Review of patient's allergies indicates:   Allergen Reactions    Sulfa (sulfonamide antibiotics) Rash     Other reaction(s): Rash, Itchy     Past Medical History:   Diagnosis Date    Dementia     Depression     Hypertension     Hypotension     Hypothyroid     OAB (overactive bladder)     chronic    Parkinson's disease      Past Surgical History:   Procedure Laterality Date    CHOLECYSTECTOMY      ESOPHAGEAL DILATION      HIP SURGERY      Interstim electrode test insertion with x-ray imaging   08/10/2020    at [facility];  test implant at S3 without difficulty;  2.0 mA was the testing and point;  no complications    KNEE SURGERY      LUMPECTOMY,BREAST, WITH RADIOACTIVE SEED LOCALIZATION AND SENTINEL LYMPH NODE BIOPSY      REMOVAL OF SACRAL NEUROSTIMULATOR DEVICE Left 11/22/2021    Procedure: REMOVAL, NEUROSTIMULATOR, SACRAL;  Surgeon: Reza Murphy MD;  Location: [facility] OR;  Service: Pain Management;  Laterality: Left;    SINUS SURGERY      TOTAL ABDOMINAL HYSTERECTOMY W/ BILATERAL SALPINGOOPHORECTOMY       Family History   Problem Relation Name Age of Onset    Heart disease Father      Heart disease Mother       Social History     Tobacco Use    Smoking status: Never     Passive exposure: Past    Smokeless tobacco: Never   Substance Use Topics    Alcohol use: Not Currently    Drug use: Never     Review of Systems   Unable to perform ROS: Age       Physical Exam     Initial Vitals [11/20/24 1435]   BP Pulse Resp Temp SpO2   114/86 91 (!) 22 99.1 °F (37.3 °C) 100 %      MAP       --         Physical  Exam    Nursing note and vitals reviewed.  Constitutional: She appears distressed (FRAIL ELDERLY F).   HENT:   Head: Normocephalic and atraumatic.   Right Ear: External ear normal.   Left Ear: External ear normal.   Nose: Nose normal.   Neck:   Normal range of motion.  Cardiovascular:  Normal rate, regular rhythm and intact distal pulses.           Murmur heard.  Pulmonary/Chest: Breath sounds normal.   Abdominal: Abdomen is soft. Bowel sounds are normal. She exhibits no distension. There is no abdominal tenderness.   Musculoskeletal:         General: Edema (MILD) present. No tenderness.      Cervical back: Normal range of motion.      Comments: DECREASED ROM DIFFUSELY     Neurological:   LETHARGIC INITIALLY THEN IMPROVED AND TALKING TO SITTER  AFTER IVF   Skin: Skin is warm and dry. Capillary refill takes less than 2 seconds. No rash noted. No erythema.   Psychiatric:   AMS         Medical Screening Exam   See Full Note    ED Course   Procedures  Labs Reviewed   COMPREHENSIVE METABOLIC PANEL - Abnormal       Result Value    Sodium 155 (*)     Potassium 5.8 (*)     Chloride 125 (*)     CO2 18 (*)     Anion Gap 18 (*)     Glucose 92      BUN 68 (*)     Creatinine 2.05 (*)     BUN/Creatinine Ratio 33 (*)     Calcium 9.2      Total Protein 6.3      Albumin 3.3 (*)     Globulin 3.0      A/G Ratio 1.1      Bilirubin, Total 0.6      Alk Phos 71      ALT <7      AST 40 (*)     eGFR 24 (*)    URINALYSIS, REFLEX TO URINE CULTURE - Abnormal    Color, UA Yellow      Clarity, UA Clear      pH, UA 5.0      Leukocytes, UA Negative      Nitrites, UA Negative      Protein, UA 30 (*)     Glucose, UA Negative      Ketones, UA 15 (*)     Urobilinogen, UA 0.2      Bilirubin, UA Negative      Blood, UA Negative      Specific Gravity, UA 1.025     CBC WITH DIFFERENTIAL - Abnormal    WBC 11.03 (*)     RBC 3.55 (*)     Hemoglobin 12.8      Hematocrit 39.3      .7 (*)     MCH 36.1 (*)     MCHC 32.6      RDW 14.9 (*)     Platelet  Count 306      MPV 10.6      Neutrophils % 82.7 (*)     Lymphocytes % 14.4 (*)     Neutrophils, Abs 9.13 (*)     Lymphocytes, Absolute 1.59      Diff Type Scan Smear      Monocytes % 2.3      Eosinophils % 0.4 (*)     Basophils % 0.2      Monocytes, Absolute 0.25      Eosinophils, Absolute 0.04      Basophils, Absolute 0.02     URINALYSIS, MICROSCOPIC - Abnormal    WBC, UA 0-5      RBC, UA 3-5 (*)     Bacteria, UA None Seen      Squamous Epithelial Cells, UA Few (*)    CBC W/ AUTO DIFFERENTIAL    Narrative:     The following orders were created for panel order CBC auto differential.  Procedure                               Abnormality         Status                     ---------                               -----------         ------                     CBC with Differential[[id number]]       Abnormal            Final result                 Please view results for these tests on the individual orders.   CBC MORPHOLOGY    Platelet Morphology Normal      Anisocytosis Few      Macrocytosis Few      Hypochromic Few            Imaging Results    None          Medications   sodium chloride 0.9% bolus 250 mL 250 mL (0 mLs Intravenous Stopped 11/20/24 5547)     Medical Decision Making  PT IS A 84 YR OLD  ELDERLY F WITH INCREASED GENERALIZED WEAKNESS AND ANOREXIA WITH HX SIMILAR AND PLAN TO PLACE ON HOSPICE TODAY. PT WITH SIMILAR ED PRESENTATION 2 WEEKS AGO IMPROVED TRANSIENTLY WITH IVF IN ED.  PT IS DNR  PT'S SITTER DENIES PT HAD A FALL, FEVER/CHILLS, OR N/V/D.    Amount and/or Complexity of Data Reviewed  Labs: ordered.     Details: Viewed and Other Results - Labs      Updated   Order   11/20/24 1513  CBC auto differential  Collected: 11/20/24 1434  Final result  Specimen: Blood         11/20/24 1513  CBC with Differential  Collected: 11/20/24 1434  Final result  Specimen: Blood      WBC 11.03 High  K/uL  RBC 3.55 Low  M/uL  Hemoglobin 12.8 g/dL  Hematocrit 39.3 %  .7 High  fL  MCH 36.1 High  pg  MCHC 32.6  g/dL  RDW 14.9 High  %  Platelet Count 306 K/uL  MPV 10.6 fL  Neutrophils % 82.7 High  %  Lymphocytes % 14.4 Low  %  Neutrophils, Abs 9.13 High  K/uL  Lymphocytes, Absolute 1.59 K/uL  Diff Type Scan Smear  Monocytes % 2.3 %  Eosinophils % 0.4 Low  %  Basophils % 0.2 %  Monocytes, Absolute 0.25 K/uL  Eosinophils, Absolute 0.04 K/uL  Basophils, Absolute 0.02 K/uL       11/20/24 1513  CBC Morphology  Collected: 11/20/24 1434  Final result  Specimen: Blood      Platelet Morphology Normal  Anisocytosis Few  Macrocytosis Few  Hypochromic Few       11/20/24 1454  Comprehensive metabolic panel  Collected: 11/20/24 1434  Final result  Specimen: Blood      Sodium 155 High  mmol/L  Potassium 5.8 High  mmol/L  Chloride 125 High  mmol/L  CO2 18 Low  mmol/L  Anion Gap 18 High  mmol/L  Glucose 92 mg/dL  BUN 68 High  mg/dL  Creatinine 2.05 High  mg/dL  BUN/Creatinine Ratio 33 High   Calcium 9.2 mg/dL  Total Protein 6.3 g/dL  Albumin 3.3 Low  g/dL  Globulin 3.0 g/dL  A/G Ratio 1.1  Bilirubin, Total 0.6 mg/dL  Alk Phos 71 U/L  ALT <7 U/L  AST 40 High  U/L  eGFR 24 Low  mL/min/1.73m2       11/20/24 1442  Urinalysis, Microscopic  Collected: 11/20/24 1428  Final result  Specimen: Urine, Catheterized      WBC, UA 0-5 /hpf  RBC, UA 3-5 Abnormal  /hpf  Bacteria, UA None Seen /hpf  Squamous Epithelial Cells, UA Few Abnormal  /lpf       11/20/24 1439  Urinalysis, Reflex to Urine Culture  Collected: 11/20/24 1428  Final result  Specimen: Urine, Catheterized      Color, UA Yellow  Clarity, UA Clear  pH, UA 5.0 pH Units  Leukocytes, UA Negative  Nitrites, UA Negative  Protein, UA 30 Abnormal   Glucose, UA Negative mg/dL  Ketones, UA 15 Abnormal  mg/dL  Urobilinogen, UA 0.2 mg/dL  Bilirubin, UA Negative  Blood, UA Negative  Specific Gravity, UA 1.025            Discussion of management or test interpretation with external provider(s): EXAM  LABS AS ABOVE MULTIPLE ABNORMAL LABS MILD INCREASE COMPARED TO 2 WEEKS AGO.    ML  IMPROVED  DC HOME WITH SITTER TO INITIATE HOSPICE CARE                                      Clinical Impression:   Final diagnoses:  [E86.0] Dehydration (Primary)  [R54] Advanced age  [Z51.5] Hospice care        ED Disposition Condition    Discharge Stable          ED Prescriptions    None       Follow-up Information       Follow up With Specialties Details Why Contact Info    Roxana Miller MD Family Medicine In 1 week  [address]  [facility] [place] [address]  [phone]               Stacie Bhardwaj MD  11/21/24 0232

## 2024-11-20 NOTE — ED NOTES
"Pt more alert and attempting to talk. CG reports pt is "more like herself." Dcd home per CG. Pt will be admitted to Hospice at home. PIETRO.   "

## 2024-11-21 ENCOUNTER — TELEPHONE (OUTPATIENT)
Dept: EMERGENCY MEDICINE | Facility: HOSPITAL | Age: 84
End: 2024-11-21
Payer: MEDICARE

## 2024-12-10 ENCOUNTER — DOCUMENT SCAN (OUTPATIENT)
Dept: HOME HEALTH SERVICES | Facility: HOSPITAL | Age: 84
End: 2024-12-10
Payer: MEDICARE

## (undated) DEVICE — DRAPE SURGICAL 3/4 SHEET 52IN X 76IN STERILE

## (undated) DEVICE — GLOVE SURGICAL PROTEXIS PI BLUE SIZE 6.0

## (undated) DEVICE — GLOVE SURGICAL PROTEXIS PI SIZE 6

## (undated) DEVICE — SUTURE VICRYL 2-0 UNDYED CT-1 ANTI

## (undated) DEVICE — ADHESIVE SKIN CLOSURE EXOFIN MICRO HV 1ML

## (undated) DEVICE — DRAPE FLUORO C ARM 36X30IN

## (undated) DEVICE — SUTURE VICRYL 4-0 FS2 UNDYED 27 IN

## (undated) DEVICE — GOWN SURGICAL STERILE LEVEL 3 / XX-LARGE

## (undated) DEVICE — GLOVE SURGICAL PROTEXIS PI SIZE 7

## (undated) DEVICE — GLOVE SURGICAL PROTEXIS PI CLASSIC SIZE 8.0

## (undated) DEVICE — CDS BASIC